# Patient Record
Sex: FEMALE | Race: BLACK OR AFRICAN AMERICAN | Employment: FULL TIME | URBAN - METROPOLITAN AREA
[De-identification: names, ages, dates, MRNs, and addresses within clinical notes are randomized per-mention and may not be internally consistent; named-entity substitution may affect disease eponyms.]

---

## 2017-02-06 ENCOUNTER — APPOINTMENT (OUTPATIENT)
Dept: CT IMAGING | Facility: HOSPITAL | Age: 57
DRG: 440 | End: 2017-02-06
Attending: EMERGENCY MEDICINE
Payer: COMMERCIAL

## 2017-02-06 ENCOUNTER — HOSPITAL ENCOUNTER (INPATIENT)
Facility: HOSPITAL | Age: 57
LOS: 1 days | Discharge: HOME | DRG: 440 | End: 2017-02-07
Attending: EMERGENCY MEDICINE | Admitting: INTERNAL MEDICINE
Payer: COMMERCIAL

## 2017-02-06 DIAGNOSIS — K85.30 DRUG-INDUCED ACUTE PANCREATITIS, UNSPECIFIED COMPLICATION STATUS: Primary | ICD-10-CM

## 2017-02-06 PROBLEM — R73.9 HYPERGLYCEMIA: Status: ACTIVE | Noted: 2017-02-06

## 2017-02-06 PROBLEM — R79.89 AZOTEMIA: Status: ACTIVE | Noted: 2017-02-06

## 2017-02-06 LAB
ALBUMIN SERPL-MCNC: 3.7 G/DL (ref 3.5–4.8)
ALP LIVER SERPL-CCNC: 77 U/L (ref 46–118)
ALT SERPL-CCNC: 17 U/L (ref 14–54)
AST SERPL-CCNC: 12 U/L (ref 15–41)
ATRIAL RATE: 87 BPM
BASOPHILS # BLD AUTO: 0.03 X10(3) UL (ref 0–0.1)
BASOPHILS NFR BLD AUTO: 0.2 %
BILIRUB SERPL-MCNC: 0.3 MG/DL (ref 0.1–2)
BUN BLD-MCNC: 22 MG/DL (ref 8–20)
CALCIUM BLD-MCNC: 8.5 MG/DL (ref 8.3–10.3)
CHLORIDE: 103 MMOL/L (ref 101–111)
CHOLEST SMN-MCNC: 184 MG/DL (ref ?–200)
CO2: 26 MMOL/L (ref 22–32)
CREAT BLD-MCNC: 0.87 MG/DL (ref 0.55–1.02)
EOSINOPHIL # BLD AUTO: 0.1 X10(3) UL (ref 0–0.3)
EOSINOPHIL NFR BLD AUTO: 0.7 %
ERYTHROCYTE [DISTWIDTH] IN BLOOD BY AUTOMATED COUNT: 13.6 % (ref 11.5–16)
EST. AVERAGE GLUCOSE BLD GHB EST-MCNC: 286 MG/DL (ref 68–126)
GLUCOSE BLD-MCNC: 158 MG/DL (ref 65–99)
GLUCOSE BLD-MCNC: 186 MG/DL (ref 65–99)
GLUCOSE BLD-MCNC: 252 MG/DL (ref 65–99)
GLUCOSE BLD-MCNC: 362 MG/DL (ref 70–99)
GLUCOSE BLD-MCNC: 87 MG/DL (ref 65–99)
HBA1C MFR BLD HPLC: 11.6 % (ref ?–5.7)
HCT VFR BLD AUTO: 41.9 % (ref 34–50)
HDLC SERPL-MCNC: 48 MG/DL (ref 45–?)
HDLC SERPL: 3.83 {RATIO} (ref ?–4.44)
HGB BLD-MCNC: 14.4 G/DL (ref 12–16)
IMMATURE GRANULOCYTE COUNT: 0.02 X10(3) UL (ref 0–1)
IMMATURE GRANULOCYTE RATIO %: 0.1 %
LDLC SERPL CALC-MCNC: 93 MG/DL (ref ?–130)
LIPASE: >7500 U/L (ref 73–393)
LYMPHOCYTES # BLD AUTO: 4.3 X10(3) UL (ref 0.9–4)
LYMPHOCYTES NFR BLD AUTO: 32.2 %
M PROTEIN MFR SERPL ELPH: 7.4 G/DL (ref 6.1–8.3)
MCH RBC QN AUTO: 29 PG (ref 27–33.2)
MCHC RBC AUTO-ENTMCNC: 34.4 G/DL (ref 31–37)
MCV RBC AUTO: 84.5 FL (ref 81–100)
MONOCYTES # BLD AUTO: 0.98 X10(3) UL (ref 0.1–0.6)
MONOCYTES NFR BLD AUTO: 7.3 %
NEUTROPHIL ABS PRELIM: 7.94 X10 (3) UL (ref 1.3–6.7)
NEUTROPHILS # BLD AUTO: 7.94 X10(3) UL (ref 1.3–6.7)
NEUTROPHILS NFR BLD AUTO: 59.5 %
NONHDLC SERPL-MCNC: 136 MG/DL (ref ?–130)
P AXIS: 74 DEGREES
P-R INTERVAL: 164 MS
PLATELET # BLD AUTO: 204 10(3)UL (ref 150–450)
POTASSIUM SERPL-SCNC: 3.8 MMOL/L (ref 3.6–5.1)
Q-T INTERVAL: 392 MS
QRS DURATION: 82 MS
QTC CALCULATION (BEZET): 471 MS
R AXIS: 32 DEGREES
RBC # BLD AUTO: 4.96 X10(6)UL (ref 3.8–5.1)
RED CELL DISTRIBUTION WIDTH-SD: 42.1 FL (ref 35.1–46.3)
SODIUM SERPL-SCNC: 139 MMOL/L (ref 136–144)
T AXIS: 83 DEGREES
TRIGLYCERIDES: 213 MG/DL (ref ?–150)
TROPONIN: <0.046 NG/ML (ref ?–0.05)
VENTRICULAR RATE: 87 BPM
VLDL: 43 MG/DL (ref 5–40)
WBC # BLD AUTO: 13.4 X10(3) UL (ref 4–13)

## 2017-02-06 PROCEDURE — 99223 1ST HOSP IP/OBS HIGH 75: CPT | Performed by: INTERNAL MEDICINE

## 2017-02-06 PROCEDURE — 74177 CT ABD & PELVIS W/CONTRAST: CPT

## 2017-02-06 RX ORDER — HYDROMORPHONE HYDROCHLORIDE 1 MG/ML
0.5 INJECTION, SOLUTION INTRAMUSCULAR; INTRAVENOUS; SUBCUTANEOUS EVERY 30 MIN PRN
Status: ACTIVE | OUTPATIENT
Start: 2017-02-06 | End: 2017-02-06

## 2017-02-06 RX ORDER — ONDANSETRON 2 MG/ML
4 INJECTION INTRAMUSCULAR; INTRAVENOUS ONCE
Status: COMPLETED | OUTPATIENT
Start: 2017-02-06 | End: 2017-02-06

## 2017-02-06 RX ORDER — PRAVASTATIN SODIUM 20 MG
20 TABLET ORAL NIGHTLY
Status: DISCONTINUED | OUTPATIENT
Start: 2017-02-06 | End: 2017-02-07

## 2017-02-06 RX ORDER — SODIUM CHLORIDE 9 MG/ML
INJECTION, SOLUTION INTRAVENOUS CONTINUOUS
Status: ACTIVE | OUTPATIENT
Start: 2017-02-06 | End: 2017-02-06

## 2017-02-06 RX ORDER — ONDANSETRON 2 MG/ML
4 INJECTION INTRAMUSCULAR; INTRAVENOUS EVERY 6 HOURS PRN
Status: DISCONTINUED | OUTPATIENT
Start: 2017-02-06 | End: 2017-02-07

## 2017-02-06 RX ORDER — HYDROMORPHONE HYDROCHLORIDE 1 MG/ML
0.8 INJECTION, SOLUTION INTRAMUSCULAR; INTRAVENOUS; SUBCUTANEOUS EVERY 2 HOUR PRN
Status: DISCONTINUED | OUTPATIENT
Start: 2017-02-06 | End: 2017-02-07

## 2017-02-06 RX ORDER — HYDROCODONE BITARTRATE AND ACETAMINOPHEN 5; 325 MG/1; MG/1
1 TABLET ORAL EVERY 4 HOURS PRN
Status: DISCONTINUED | OUTPATIENT
Start: 2017-02-06 | End: 2017-02-07

## 2017-02-06 RX ORDER — SODIUM CHLORIDE 9 MG/ML
INJECTION, SOLUTION INTRAVENOUS CONTINUOUS
Status: DISCONTINUED | OUTPATIENT
Start: 2017-02-06 | End: 2017-02-06

## 2017-02-06 RX ORDER — HYDROMORPHONE HYDROCHLORIDE 1 MG/ML
0.2 INJECTION, SOLUTION INTRAMUSCULAR; INTRAVENOUS; SUBCUTANEOUS EVERY 2 HOUR PRN
Status: DISCONTINUED | OUTPATIENT
Start: 2017-02-06 | End: 2017-02-07

## 2017-02-06 RX ORDER — HYDROMORPHONE HYDROCHLORIDE 1 MG/ML
0.4 INJECTION, SOLUTION INTRAMUSCULAR; INTRAVENOUS; SUBCUTANEOUS EVERY 2 HOUR PRN
Status: DISCONTINUED | OUTPATIENT
Start: 2017-02-06 | End: 2017-02-07

## 2017-02-06 RX ORDER — DEXTROSE MONOHYDRATE 25 G/50ML
50 INJECTION, SOLUTION INTRAVENOUS
Status: DISCONTINUED | OUTPATIENT
Start: 2017-02-06 | End: 2017-02-07

## 2017-02-06 RX ORDER — HYDROCODONE BITARTRATE AND ACETAMINOPHEN 5; 325 MG/1; MG/1
2 TABLET ORAL EVERY 4 HOURS PRN
Status: DISCONTINUED | OUTPATIENT
Start: 2017-02-06 | End: 2017-02-07

## 2017-02-06 RX ORDER — ONDANSETRON 2 MG/ML
4 INJECTION INTRAMUSCULAR; INTRAVENOUS EVERY 4 HOURS PRN
Status: DISCONTINUED | OUTPATIENT
Start: 2017-02-06 | End: 2017-02-07

## 2017-02-06 RX ORDER — ENOXAPARIN SODIUM 100 MG/ML
40 INJECTION SUBCUTANEOUS DAILY
Status: DISCONTINUED | OUTPATIENT
Start: 2017-02-06 | End: 2017-02-07

## 2017-02-06 RX ORDER — POTASSIUM CHLORIDE 20 MEQ/1
40 TABLET, EXTENDED RELEASE ORAL ONCE
Status: COMPLETED | OUTPATIENT
Start: 2017-02-06 | End: 2017-02-06

## 2017-02-06 RX ORDER — KETOROLAC TROMETHAMINE 30 MG/ML
30 INJECTION, SOLUTION INTRAMUSCULAR; INTRAVENOUS ONCE
Status: COMPLETED | OUTPATIENT
Start: 2017-02-06 | End: 2017-02-06

## 2017-02-06 RX ORDER — HYDROMORPHONE HYDROCHLORIDE 1 MG/ML
1 INJECTION, SOLUTION INTRAMUSCULAR; INTRAVENOUS; SUBCUTANEOUS EVERY 30 MIN PRN
Status: DISCONTINUED | OUTPATIENT
Start: 2017-02-06 | End: 2017-02-07

## 2017-02-06 RX ORDER — ACETAMINOPHEN 325 MG/1
650 TABLET ORAL EVERY 4 HOURS PRN
Status: DISCONTINUED | OUTPATIENT
Start: 2017-02-06 | End: 2017-02-07

## 2017-02-06 RX ORDER — SODIUM CHLORIDE, SODIUM LACTATE, POTASSIUM CHLORIDE, CALCIUM CHLORIDE 600; 310; 30; 20 MG/100ML; MG/100ML; MG/100ML; MG/100ML
INJECTION, SOLUTION INTRAVENOUS CONTINUOUS
Status: DISCONTINUED | OUTPATIENT
Start: 2017-02-06 | End: 2017-02-07

## 2017-02-06 NOTE — H&P
[facility]                                                               History & Physical         Greg Villatoro Patient Status:  Emergency    1960 MRN [id number]   Location [facility] Attending Juanjose Cordon, 175 lb (79.379 kg), SpO2 97 %, not currently breastfeeding. General: No acute distress. Alert and oriented x 3. HEENT: Moist mucous membranes. EOM-I. PERRL  Neck: No lymphadenopathy. No JVD. No carotid bruits.   Respiratory: Clear to auscultation bilater no    Plan of care discussed with patient and patient's  at bedside      Discussed with ER Physician. Discussed in detail with patient and family.   Dr. Makeda Chicas will follow from morning today          Sandy Clements MD  2/6/2017  4:49 AM

## 2017-02-06 NOTE — CONSULTS
[facility]      Endocrinology Consultation    Fermín Sandoval Patient Status:  Inpatient    1960 MRN [id number]   [facility] 3NW-A Attending Jacque James MD   Hosp Day # 0 PCP Ramy De León MD     Reason for Consultation:  Luis A Haines Rfl:  Taking   Pravastatin Sodium 20 MG Oral Tab Take 20 mg by mouth nightly. Disp:  Rfl:  Taking   glimepiride 4 MG Oral Tab Take 1 tablet (4 mg total) by mouth 2 (two) times daily.  Disp: 180 tablet Rfl: 3 Taking       History:  Past Medical History   Debo PRN **OR** HYDROcodone-acetaminophen (NORCO) 5-325 MG per tab 2 tablet, 2 tablet, Oral, Q4H PRN  •  HYDROmorphone HCl PF (DILAUDID) 1 MG/ML injection 0.2 mg, 0.2 mg, Intravenous, Q2H PRN **OR** HYDROmorphone HCl PF (DILAUDID) 1 MG/ML injection 0.4 mg, 0.4 Ref. Range 2/6/2017 04:04   HGBA1C Latest Ref Range: <5.7 % 11.6 (H)     Impression and Plan:    Uncontrolled type 2 diabetes mellitus with hyperglycemia  Long term insulin use   - A1c 11.6%   - Will need more education, glucose checks throughout the day r

## 2017-02-06 NOTE — CONSULTS
[facility]  Diabetes Clinical Nurse Specialist Consult Note    [facility] Patient Status:  Inpatient    1960 MRN [id number]   [facility] 3NW-A Attending Stephen Pierce MD   Hosp Day # 0 PCP Jassi Allen MD     Reason for  the [employer]" ernst for looking up carbohydrate amounts.      Plan:    · Diabetes videos ordered  · Everyone can carbohydrate count  · Basic Skills for Controlling Diabetes    · Dietitian consult for introduction to carbohydrate counting and me

## 2017-02-06 NOTE — ED PROVIDER NOTES
Patient Seen in: [facility] Emergency Department    History   Patient presents with:  Abdomen/Flank Pain (GI/)    Stated Complaint: ABD. PAIN    HPI    Very pleasant 65-year-old female complaining of having epigastric pain that radiates into her ba 0406 108/60 mmHg   Pulse 02/06/17 0406 87   Resp 02/06/17 0406 16   Temp 02/06/17 0406 97.7 °F (36.5 °C)   Temp src 02/06/17 0406 Temporal   SpO2 02/06/17 0406 97 %   O2 Device 02/06/17 0406 None (Room air)       Current:/60 mmHg  Pulse 87  Temp([facility]) -----------         ------                     CBC W/ DIFFERENTIAL[[id number]]          Abnormal            Final result                 Please view results for these tests on the individual orders.    RAINBOW DRAW BLUE   RAINBOW DRAW GO

## 2017-02-06 NOTE — ED INITIAL ASSESSMENT (HPI)
Pt ambulatory to er with family c.c. Mid abd pain x four days started to upper right and now feels tightness to upper back. No vomiting but nauseated, no diarrhea.

## 2017-02-06 NOTE — CONSULTS
[facility]                       Gastroenterology [address] [facility]    Gloria Monterroso Patient Status:  Inpatient    1960 MRN [id number]   [facility] 3NW-A Attending Michael Mclaughlin MD   [facility] Day # 0 P unremarkable colonoscopy 7 years ago at [facility] and has never completed an EUS or EGD.  Since presenting to the ER she has completed a CT scan of the abdomen and pelvis with IV contrast which suggests fat stranding and a small amount of fluid s tab 1 tablet 1 tablet Oral Q4H PRN   Or      HYDROcodone-acetaminophen (NORCO) 5-325 MG per tab 2 tablet 2 tablet Oral Q4H PRN   HYDROmorphone HCl PF (DILAUDID) 1 MG/ML injection 0.2 mg 0.2 mg Intravenous Q2H PRN   Or      HYDROmorphone HCl PF (DILAUDID) 1 nephrolithiasis           Psychiatric: The patient reports no history of depression, anxiety, suicidal ideation, or homicidal ideation           Oncologic: The patient reports no history of prior solid tumor or hematologic malignancy           ENT: [facility] HGB  14.4   HCT  41.9   MCV  84.5   MCH  29.0   MCHC  34.4   RDW  13.6   NEPRELIM  7.94*   WBC  13.4*   PLT  204.0       Recent Labs   Lab  02/06/17   0404   ALT  17   AST  12*       Imaging:   PROCEDURE:  CT ABDOMEN+PELVIS(CONTRAST ONLY)(CPT=74177)     MD        __________________________________________________________________________    PROCEDURE:  US ABDOMEN COMPLETE (CPT=76700)      COMPARISON:  None.      INDICATIONS:  R10.11 Right upper quadrant pain      TECHNIQUE:  Real time gray-scale ultrasound 150 ml/hr  2. NPO with ice chips  3. IgG 4 levels to blood in lab  4. Encourage activity as tolerated; IS hourly while awake  5. Pain control per PCP recommendations  6. Zofran 4 mg IV q 6 hours as needed for nausea  7.  Repeat CMP, CBC, Mg in AM replacing

## 2017-02-06 NOTE — PROGRESS NOTES
Pt seen and examined. SHe has been admitted this am by Dr Samara Cuellar. Her pain is controlled. WIll follow closely. Endo following for DM 2.     Kiran Jacobo MD  [facility] Hospitalist

## 2017-02-07 ENCOUNTER — APPOINTMENT (OUTPATIENT)
Dept: ULTRASOUND IMAGING | Facility: HOSPITAL | Age: 57
DRG: 440 | End: 2017-02-07
Attending: INTERNAL MEDICINE
Payer: COMMERCIAL

## 2017-02-07 VITALS
WEIGHT: 175 LBS | HEIGHT: 64 IN | TEMPERATURE: 98 F | HEART RATE: 75 BPM | RESPIRATION RATE: 18 BRPM | OXYGEN SATURATION: 100 % | DIASTOLIC BLOOD PRESSURE: 75 MMHG | BODY MASS INDEX: 29.88 KG/M2 | SYSTOLIC BLOOD PRESSURE: 139 MMHG

## 2017-02-07 LAB
ALBUMIN SERPL-MCNC: 2.9 G/DL (ref 3.5–4.8)
ALP LIVER SERPL-CCNC: 60 U/L (ref 46–118)
ALT SERPL-CCNC: 13 U/L (ref 14–54)
AST SERPL-CCNC: 11 U/L (ref 15–41)
BASOPHILS # BLD AUTO: 0.01 X10(3) UL (ref 0–0.1)
BASOPHILS NFR BLD AUTO: 0.1 %
BILIRUB SERPL-MCNC: 0.5 MG/DL (ref 0.1–2)
BUN BLD-MCNC: 15 MG/DL (ref 8–20)
CALCIUM BLD-MCNC: 8.2 MG/DL (ref 8.3–10.3)
CHLORIDE: 110 MMOL/L (ref 101–111)
CO2: 26 MMOL/L (ref 22–32)
CREAT BLD-MCNC: 0.45 MG/DL (ref 0.55–1.02)
EOSINOPHIL # BLD AUTO: 0.08 X10(3) UL (ref 0–0.3)
EOSINOPHIL NFR BLD AUTO: 1 %
ERYTHROCYTE [DISTWIDTH] IN BLOOD BY AUTOMATED COUNT: 13.5 % (ref 11.5–16)
GLUCOSE BLD-MCNC: 90 MG/DL (ref 65–99)
GLUCOSE BLD-MCNC: 95 MG/DL (ref 70–99)
GLUCOSE BLD-MCNC: 98 MG/DL (ref 65–99)
HAV IGM SER QL: 1.9 MG/DL (ref 1.7–3)
HCT VFR BLD AUTO: 36.1 % (ref 34–50)
HGB BLD-MCNC: 11.9 G/DL (ref 12–16)
IMMATURE GRANULOCYTE COUNT: 0.01 X10(3) UL (ref 0–1)
IMMATURE GRANULOCYTE RATIO %: 0.1 %
IMMUNOGLOBULIN G SUBCLASS 1: 598 MG/DL
IMMUNOGLOBULIN G SUBCLASS 2: 255 MG/DL
IMMUNOGLOBULIN G SUBCLASS 3: 32 MG/DL
IMMUNOGLOBULIN G SUBCLASS 4: 61 MG/DL
LIPASE: 1163 U/L (ref 73–393)
LYMPHOCYTES # BLD AUTO: 3.4 X10(3) UL (ref 0.9–4)
LYMPHOCYTES NFR BLD AUTO: 43.1 %
M PROTEIN MFR SERPL ELPH: 6.4 G/DL (ref 6.1–8.3)
MCH RBC QN AUTO: 28.8 PG (ref 27–33.2)
MCHC RBC AUTO-ENTMCNC: 33 G/DL (ref 31–37)
MCV RBC AUTO: 87.4 FL (ref 81–100)
MONOCYTES # BLD AUTO: 0.56 X10(3) UL (ref 0.1–0.6)
MONOCYTES NFR BLD AUTO: 7.1 %
NEUTROPHIL ABS PRELIM: 3.82 X10 (3) UL (ref 1.3–6.7)
NEUTROPHILS # BLD AUTO: 3.82 X10(3) UL (ref 1.3–6.7)
NEUTROPHILS NFR BLD AUTO: 48.6 %
PLATELET # BLD AUTO: 161 10(3)UL (ref 150–450)
POTASSIUM SERPL-SCNC: 3.5 MMOL/L (ref 3.6–5.1)
RBC # BLD AUTO: 4.13 X10(6)UL (ref 3.8–5.1)
RED CELL DISTRIBUTION WIDTH-SD: 43 FL (ref 35.1–46.3)
SODIUM SERPL-SCNC: 144 MMOL/L (ref 136–144)
WBC # BLD AUTO: 7.9 X10(3) UL (ref 4–13)

## 2017-02-07 PROCEDURE — 99239 HOSP IP/OBS DSCHRG MGMT >30: CPT | Performed by: INTERNAL MEDICINE

## 2017-02-07 PROCEDURE — 76700 US EXAM ABDOM COMPLETE: CPT

## 2017-02-07 RX ORDER — VALSARTAN AND HYDROCHLOROTHIAZIDE 160; 12.5 MG/1; MG/1
1 TABLET, FILM COATED ORAL DAILY
Status: DISCONTINUED | OUTPATIENT
Start: 2017-02-07 | End: 2017-02-07 | Stop reason: RX

## 2017-02-07 RX ORDER — POTASSIUM CHLORIDE 20 MEQ/1
40 TABLET, EXTENDED RELEASE ORAL EVERY 4 HOURS
Status: DISCONTINUED | OUTPATIENT
Start: 2017-02-07 | End: 2017-02-07

## 2017-02-07 RX ORDER — HYDROCODONE BITARTRATE AND ACETAMINOPHEN 5; 325 MG/1; MG/1
1 TABLET ORAL EVERY 4 HOURS PRN
Qty: 10 TABLET | Refills: 0 | Status: SHIPPED | OUTPATIENT
Start: 2017-02-07 | End: 2017-03-02

## 2017-02-07 NOTE — PROGRESS NOTES
Written and verbal discharge instructions given to patient and mother, verbalize understanding. IV discontinued in RAC angio-cath tip intact, site free from redness, swelling, or drainage, patient denies pain at site. Dressing applied.   Prescription given

## 2017-02-07 NOTE — PROGRESS NOTES
Gastroenterology Progress Note  S: Had mild bloating after breakfast of clear liquids, now eating soft diet.  No pain, wants to go home, passing flatus  O: /71 mmHg  Pulse 79  Temp(Src) 98.1 °F (36.7 °C) (Oral)  Resp 18  Ht 162.6 cm (5' 4\")  Wt 175 l

## 2017-02-07 NOTE — PAYOR COMM NOTE
Attending Physician: Estelita Vazquez MD    Review Type: ADMISSION   Reviewer: Milan Hammans       Date: February 7, 2017 - 12:17 PM  Payor: Nusrat JEAN PPO.EPO[employer]  Authorization Number: [id number]  Admit date: 2/6/2017  3:58 AM   Admitted from     LUNG BASE:  Scattered atelectasis. LIVER:  Unremarkable. BILIARY:  Unremarkable. SPLEEN:  Unremarkable. PANCREAS:  There is fat stranding and a small amount of fluid surrounding the pancreas suspicious for acute pancreatitis.   ADRENALS:  Unremarkabl Date Action Dose Route User    2/7/2017 0957 Given 40 mg Subcutaneous (Left Lower Abdomen) Lala Medina RN      HYDROmorphone HCl PF (DILAUDID) 1 MG/ML injection 0.4 mg     Date Action Dose Route User    2/6/2017 2011 Given 0.4 mg Intravenous Kremic, Am Date Action Dose Route User    2/6/2017 2206 Given 20 mg Oral Kavitha Browning RN          RESULTS LAST 24HRS:  Labs Reviewed   LIPASE - Abnormal; Notable for the following:     Lipase >7500 (*)     All other components within normal limits   COMP METABOLIC CBC WITH DIFFERENTIAL WITH PLATELET    Narrative: The following orders were created for panel order CBC WITH DIFFERENTIAL WITH PLATELET.   Procedure                               Abnormality         Status                     ---------

## 2017-02-07 NOTE — DISCHARGE SUMMARY
[facility] [place] [facility]OSPITALIST  DISCHARGE SUMMARY     Clarice Mcdonnell Patient Status:  Inpatient    1960 MRN [id number]   [facility] 3NW-A Attending Jesu Almeida MD   Hosp Day # 1 PCP Riki Mitchell MD     Date of Admission: 2017  Date of to tolerate her diet. She will be discharged home and follow up as outpatient. Her glimepride and statin has been discontinued.      Procedures during hospitalization:   • none    Incidental or significant findings and recommendations (brief description 18  BP: (110-119)/(55-99) 116/71 mmHg    Physical Exam:    General: No acute distress. Respiratory: Clear to auscultation bilaterally. No wheezes. No rhonchi. Cardiovascular: S1, S2. Regular rate and rhythm. No murmurs, rubs or gallops.    Abdomen: Soft,

## 2017-02-07 NOTE — PROGRESS NOTES
[facility]  Endocrinology Progress Note    Junstephanie Erp Patient Status:  Inpatient    1960 MRN [id number]   [facility] 3NW-A Attending Roz Turner MD   Hosp Day # 1 PCP Jeremie Dodge MD     Subjective:  Walking around, fe 2/6/2017 04:04   CHOLESTEROL, TOTAL Latest Ref Range: <200 mg/dL 184   Triglycerides Latest Ref Range: <150 mg/dL 213 (H)   HDL Cholesterol Latest Ref Range: >45 mg/dL 48   VLDL Latest Ref Range: 5-40 mg/dL 43 (H)   T. CHOL/HDL RATIO Latest Ref Range: <4.4

## 2017-02-07 NOTE — DIETARY NOTE
Nutrition Short Note    Dietitian consult received for diabetes education. Noted pt hemoglobin A1C 11.6. Reviewed and provided handouts on carbohydrate counting/label reading.  Discussed the role of CHO/protein and fat in BS control, reviewed importance of

## 2017-02-07 NOTE — PROGRESS NOTES
Patient seen and examined this am.     She is overall doing great. She is hungry and wants to try low fat diet. If she tolerates lunch she may be discharged. Med rec completed. Stop STATIN. DC summary to follow.      Daljit Crisostomo MD  [facility]

## 2017-02-07 NOTE — PLAN OF CARE
Problem: PAIN - ADULT  Goal: Verbalizes/displays adequate comfort level or patient’s stated pain goal  INTERVENTIONS:  - Encourage pt to monitor pain and request assistance  - Assess pain using appropriate pain scale  - Administer analgesics based on type interpreters to assist at discharge as needed  - Consider post-discharge preferences of patient/family/discharge partner  - Complete POLST form as appropriate  - Assess patient’s ability to be responsible for managing their own health  - Refer to [facility]

## 2017-04-03 ENCOUNTER — ANESTHESIA EVENT (OUTPATIENT)
Dept: ENDOSCOPY | Facility: HOSPITAL | Age: 57
End: 2017-04-03

## 2017-04-04 ENCOUNTER — HOSPITAL ENCOUNTER (OUTPATIENT)
Facility: HOSPITAL | Age: 57
Setting detail: HOSPITAL OUTPATIENT SURGERY
Discharge: HOME | End: 2017-04-04
Attending: INTERNAL MEDICINE | Admitting: INTERNAL MEDICINE
Payer: COMMERCIAL

## 2017-04-04 ENCOUNTER — ANESTHESIA (OUTPATIENT)
Dept: ENDOSCOPY | Facility: HOSPITAL | Age: 57
End: 2017-04-04

## 2017-04-04 ENCOUNTER — SURGERY (OUTPATIENT)
Age: 57
End: 2017-04-04

## 2017-04-04 VITALS
OXYGEN SATURATION: 99 % | DIASTOLIC BLOOD PRESSURE: 71 MMHG | WEIGHT: 177 LBS | BODY MASS INDEX: 30.22 KG/M2 | HEIGHT: 64 IN | HEART RATE: 74 BPM | TEMPERATURE: 98 F | RESPIRATION RATE: 15 BRPM | SYSTOLIC BLOOD PRESSURE: 153 MMHG

## 2017-04-04 PROCEDURE — 88305 TISSUE EXAM BY PATHOLOGIST: CPT | Performed by: INTERNAL MEDICINE

## 2017-04-04 PROCEDURE — 82962 GLUCOSE BLOOD TEST: CPT

## 2017-04-04 PROCEDURE — 0DB98ZX EXCISION OF DUODENUM, VIA NATURAL OR ARTIFICIAL OPENING ENDOSCOPIC, DIAGNOSTIC: ICD-10-PCS | Performed by: INTERNAL MEDICINE

## 2017-04-04 PROCEDURE — 0DB68ZX EXCISION OF STOMACH, VIA NATURAL OR ARTIFICIAL OPENING ENDOSCOPIC, DIAGNOSTIC: ICD-10-PCS | Performed by: INTERNAL MEDICINE

## 2017-04-04 PROCEDURE — 0DJD8ZZ INSPECTION OF LOWER INTESTINAL TRACT, VIA NATURAL OR ARTIFICIAL OPENING ENDOSCOPIC: ICD-10-PCS | Performed by: INTERNAL MEDICINE

## 2017-04-04 PROCEDURE — 0DJ08ZZ INSPECTION OF UPPER INTESTINAL TRACT, VIA NATURAL OR ARTIFICIAL OPENING ENDOSCOPIC: ICD-10-PCS | Performed by: INTERNAL MEDICINE

## 2017-04-04 RX ORDER — DEXTROSE MONOHYDRATE 25 G/50ML
50 INJECTION, SOLUTION INTRAVENOUS
Status: DISCONTINUED | OUTPATIENT
Start: 2017-04-04 | End: 2017-04-04

## 2017-04-04 RX ORDER — SODIUM CHLORIDE, SODIUM LACTATE, POTASSIUM CHLORIDE, CALCIUM CHLORIDE 600; 310; 30; 20 MG/100ML; MG/100ML; MG/100ML; MG/100ML
INJECTION, SOLUTION INTRAVENOUS CONTINUOUS
Status: DISCONTINUED | OUTPATIENT
Start: 2017-04-04 | End: 2017-04-04

## 2017-04-04 RX ORDER — HYDROMORPHONE HYDROCHLORIDE 1 MG/ML
0.4 INJECTION, SOLUTION INTRAMUSCULAR; INTRAVENOUS; SUBCUTANEOUS EVERY 5 MIN PRN
Status: DISCONTINUED | OUTPATIENT
Start: 2017-04-04 | End: 2017-04-04

## 2017-04-04 RX ORDER — NALOXONE HYDROCHLORIDE 0.4 MG/ML
80 INJECTION, SOLUTION INTRAMUSCULAR; INTRAVENOUS; SUBCUTANEOUS AS NEEDED
Status: DISCONTINUED | OUTPATIENT
Start: 2017-04-04 | End: 2017-04-04

## 2017-04-04 NOTE — OPERATIVE REPORT
Joanjelena Tejas Patient Status:  Hospital Outpatient Surgery    1960 MRN [id number]   [facility] ENDOSCOPY Attending Fernando Dominguez MD   Hosp Day # 0 PCP Asya Colindres MD     PREOPERATIVE DIAGNOSIS/INDICATION: H/o pancreatitis the GB appear wnl, the biliary tree appear wnl, the confluence of the portal vein, superior mesenteric vein and splenic vein appear wnl.   THERAPEUTICS: Random biopsies were performed from the duodenum 2nd portion, antrum and gastric nodularity  RECOMMENDAT

## 2017-04-04 NOTE — ANESTHESIA PREPROCEDURE EVALUATION
PRE-OP EVALUATION    Patient Name: Brandie Lozano    Pre-op Diagnosis: PANCREATITIS     Procedure(s):  ESOPHAGOGASTRODUODENOSCOPY, COLONOSCOPY, AND ENDOSCOPIC ULTRASOUND        Surgeon(s) and Role:     Gia Samayoa MD - Primary    Pre-op vitals re Smoking status: Heavy Tobacco Smoker  0.50 Packs/Day  For 37.00 Years     Smokeless tobacco: Not on file    Alcohol Use: No    Comment: social, wine only       Drug Use: No     Available pre-op labs reviewed.     Lab Results  Component Value Date   WBC 7.9

## 2017-04-04 NOTE — ANESTHESIA POSTPROCEDURE EVALUATION
[address] [facility] Emergency Department Patient Status:  Hospital Outpatient Surgery   Age/Gender 61year old female MRN [id number]   Location [address] [place] [address]. Attending Adrian Ibarra MD   Hosp Day # 0 PCP Riki Mitchell MD       Anesthesia Post-o

## 2017-04-04 NOTE — OPERATIVE REPORT
Angela Murphy Patient Status:  Hospital Outpatient Surgery    1960 MRN [id number]   [facility] ENDOSCOPY Attending Lisa Stockton MD   Hosp Day # 0 PCP Kunal Shine MD     PREOPERATIVE DIAGNOSIS/INDICATION: H/o colon polyps

## 2017-04-04 NOTE — H&P
[address] [facility] Patient Status:  Hospital Outpatient Surgery    1960 MRN [id number]   [facility] ENDOSCOPY Attending Jyoti Shin MD   Hosp Day # 0 PCP Cory Arambula MD     CC: H/o pancreatit HYDROmorphone HCl PF (DILAUDID) 1 MG/ML injection 0.4 mg, 0.4 mg, Intravenous, Q5 Min PRN  •  Atropine Sulfate 0.1 MG/ML injection 0.5 mg, 0.5 mg, Intravenous, PRN  •  Naloxone HCl (NARCAN) 0.4 MG/ML injection 80 mcg, 80 mcg, Intravenous, PRN    Physical E

## 2017-04-06 PROBLEM — E11.65 TYPE 2 DIABETES MELLITUS WITH HYPERGLYCEMIA, WITH LONG-TERM CURRENT USE OF INSULIN (HCC): Status: ACTIVE | Noted: 2017-04-06

## 2017-04-06 PROBLEM — Z79.4 TYPE 2 DIABETES MELLITUS WITH HYPERGLYCEMIA, WITH LONG-TERM CURRENT USE OF INSULIN (HCC): Status: ACTIVE | Noted: 2017-04-06

## 2017-04-06 PROBLEM — R73.9 HYPERGLYCEMIA: Status: RESOLVED | Noted: 2017-02-06 | Resolved: 2017-04-06

## 2017-06-06 PROBLEM — Z87.19 HISTORY OF PANCREATITIS: Status: ACTIVE | Noted: 2017-06-06

## 2017-09-27 ENCOUNTER — LAB ENCOUNTER (OUTPATIENT)
Dept: LAB | Facility: HOSPITAL | Age: 57
End: 2017-09-27
Attending: INTERNAL MEDICINE
Payer: COMMERCIAL

## 2017-09-27 DIAGNOSIS — I10 ESSENTIAL HYPERTENSION: ICD-10-CM

## 2017-09-27 DIAGNOSIS — K85.30 DRUG-INDUCED ACUTE PANCREATITIS, UNSPECIFIED COMPLICATION STATUS: ICD-10-CM

## 2017-09-27 DIAGNOSIS — Z87.19 HISTORY OF PANCREATITIS: ICD-10-CM

## 2017-09-27 DIAGNOSIS — Z79.4 TYPE 2 DIABETES MELLITUS WITH HYPERGLYCEMIA, WITH LONG-TERM CURRENT USE OF INSULIN (HCC): ICD-10-CM

## 2017-09-27 DIAGNOSIS — E78.2 MIXED HYPERLIPIDEMIA: ICD-10-CM

## 2017-09-27 DIAGNOSIS — E11.65 TYPE 2 DIABETES MELLITUS WITH HYPERGLYCEMIA, WITH LONG-TERM CURRENT USE OF INSULIN (HCC): ICD-10-CM

## 2017-09-27 LAB
ALBUMIN SERPL-MCNC: 3.1 G/DL (ref 3.5–4.8)
ALP LIVER SERPL-CCNC: 65 U/L (ref 46–118)
ALT SERPL-CCNC: 18 U/L (ref 14–54)
AST SERPL-CCNC: 8 U/L (ref 15–41)
BILIRUB SERPL-MCNC: 0.3 MG/DL (ref 0.1–2)
BUN BLD-MCNC: 17 MG/DL (ref 8–20)
CALCIUM BLD-MCNC: 8.8 MG/DL (ref 8.3–10.3)
CHLORIDE: 107 MMOL/L (ref 101–111)
CO2: 24 MMOL/L (ref 22–32)
CREAT BLD-MCNC: 0.59 MG/DL (ref 0.55–1.02)
CREAT UR-SCNC: 142 MG/DL
FREE T4: 1.1 NG/DL (ref 0.9–1.8)
GLUCOSE BLD-MCNC: 212 MG/DL (ref 70–99)
M PROTEIN MFR SERPL ELPH: 7.3 G/DL (ref 6.1–8.3)
MICROALBUMIN UR-MCNC: 16 MG/DL
MICROALBUMIN/CREAT 24H UR-RTO: 112.7 UG/MG (ref ?–30)
POTASSIUM SERPL-SCNC: 3.7 MMOL/L (ref 3.6–5.1)
SODIUM SERPL-SCNC: 139 MMOL/L (ref 136–144)
TSI SER-ACNC: 1.56 MIU/ML (ref 0.35–5.5)

## 2017-09-27 PROCEDURE — 82570 ASSAY OF URINE CREATININE: CPT

## 2017-09-27 PROCEDURE — 84443 ASSAY THYROID STIM HORMONE: CPT

## 2017-09-27 PROCEDURE — 82043 UR ALBUMIN QUANTITATIVE: CPT

## 2017-09-27 PROCEDURE — 80053 COMPREHEN METABOLIC PANEL: CPT

## 2017-09-27 PROCEDURE — 36415 COLL VENOUS BLD VENIPUNCTURE: CPT

## 2017-09-27 PROCEDURE — 84439 ASSAY OF FREE THYROXINE: CPT

## 2017-09-28 NOTE — PROGRESS NOTES
[phone] (home) [phone] (work)  Telephone Information:  Mobile          [phone] regarding Dr. Marks Given result note. Hours and number given. No identifier on vm.

## 2017-09-28 NOTE — PROGRESS NOTES
Patient informed of Dr. Chaz King result note. Patient verbalized understanding and agrees with plan.      11/15/2017 8:00 AM    MD JORDAN MercadoG JORDAN MED

## 2017-12-05 ENCOUNTER — HOSPITAL ENCOUNTER (OUTPATIENT)
Dept: MAMMOGRAPHY | Age: 57
Discharge: HOME | End: 2017-12-05
Attending: FAMILY MEDICINE
Payer: COMMERCIAL

## 2017-12-05 DIAGNOSIS — Z12.31 ENCOUNTER FOR SCREENING MAMMOGRAM FOR MALIGNANT NEOPLASM OF BREAST: ICD-10-CM

## 2017-12-05 PROCEDURE — 77067 SCR MAMMO BI INCL CAD: CPT | Performed by: FAMILY MEDICINE

## 2017-12-12 ENCOUNTER — HOSPITAL ENCOUNTER (OUTPATIENT)
Dept: MAMMOGRAPHY | Age: 57
Discharge: HOME | End: 2017-12-12
Attending: FAMILY MEDICINE
Payer: COMMERCIAL

## 2017-12-12 ENCOUNTER — OFFICE VISIT (OUTPATIENT)
Dept: SURGERY | Facility: CLINIC | Age: 57
End: 2017-12-12

## 2017-12-12 VITALS — DIASTOLIC BLOOD PRESSURE: 60 MMHG | SYSTOLIC BLOOD PRESSURE: 120 MMHG | HEART RATE: 88 BPM

## 2017-12-12 DIAGNOSIS — R92.2 INCONCLUSIVE MAMMOGRAM: ICD-10-CM

## 2017-12-12 DIAGNOSIS — R20.0 NUMBNESS OF LEFT HAND: ICD-10-CM

## 2017-12-12 DIAGNOSIS — R29.898 NECK TIGHTNESS: Primary | ICD-10-CM

## 2017-12-12 PROCEDURE — 99242 OFF/OP CONSLTJ NEW/EST SF 20: CPT | Performed by: PHYSICIAN ASSISTANT

## 2017-12-12 PROCEDURE — 77061 BREAST TOMOSYNTHESIS UNI: CPT | Performed by: FAMILY MEDICINE

## 2017-12-12 PROCEDURE — 77065 DX MAMMO INCL CAD UNI: CPT | Performed by: FAMILY MEDICINE

## 2017-12-12 RX ORDER — PRAVASTATIN SODIUM 20 MG
20 TABLET ORAL NIGHTLY
COMMUNITY
End: 2017-12-13

## 2017-12-12 RX ORDER — ORPHENADRINE CITRATE 100 MG/1
100 TABLET, EXTENDED RELEASE ORAL 2 TIMES DAILY PRN
Qty: 60 TABLET | Refills: 0 | Status: ON HOLD | OUTPATIENT
Start: 2017-12-12 | End: 2021-12-21

## 2017-12-12 NOTE — H&P
Neurosurgery Clinic Visit  2017    [facility] PCP:  Jassi Allen MD    1960 MRN [id number]       CHIEF COMPLAINT:  Patient presents with:  Neuropathy: NP referred for numbness in left thumb/carpal tunnel       HISTORY OF PRESENT Erven Meigs   2017: COLONOSCOPY N/A      Comment: Procedure: COLONOSCOPY;  Surgeon: Rebecca Mast MD;  Location: [address] [place] [address] ENDOSCOPY  Family History   Problem Relation Age of Onset   • Hypertension Father    • Hypertension Mother    • Diabete numbness. 2.  Neck tightness/stiffness. She describes signs/symptoms of carpal tunnel syndrome in her left hand. She has not tried any conservative treatments, so will start with wrist splints.   We discussed other treatment options, including injectio

## 2017-12-12 NOTE — PROGRESS NOTES
Location of Pain: burning sensation in palm of left hand, numbness and tingling in thumb, and fingers of left hand with exception of pinky    Date Pain Began: 1 yr          Work Related:   No        Receiving Work Comp/Disability:   No    Numeric Rating Sc

## 2017-12-12 NOTE — PATIENT INSTRUCTIONS
Refill policies:    • Allow 2-3 business days for refills; controlled substances may take longer.   • Contact your pharmacy at least 5 days prior to running out of medication and have them send an electronic request or submit request through the [facility] have a procedure or additional testing performed. [facility]) will contact your insurance carrier to obtain pre-certification or prior authorization.     Unfortunately, [employer] has seen an increase in denial of payment even though the p

## 2018-01-08 ENCOUNTER — HOSPITAL ENCOUNTER (OUTPATIENT)
Dept: GENERAL RADIOLOGY | Facility: HOSPITAL | Age: 58
Discharge: HOME | End: 2018-01-08
Attending: PHYSICIAN ASSISTANT
Payer: COMMERCIAL

## 2018-01-08 DIAGNOSIS — R29.898 NECK TIGHTNESS: ICD-10-CM

## 2018-01-08 DIAGNOSIS — R20.0 NUMBNESS OF LEFT HAND: ICD-10-CM

## 2018-01-08 PROCEDURE — 72052 X-RAY EXAM NECK SPINE 6/>VWS: CPT | Performed by: PHYSICIAN ASSISTANT

## 2018-01-09 ENCOUNTER — OFFICE VISIT (OUTPATIENT)
Dept: SURGERY | Facility: CLINIC | Age: 58
End: 2018-01-09

## 2018-01-09 ENCOUNTER — TELEPHONE (OUTPATIENT)
Dept: SURGERY | Facility: CLINIC | Age: 58
End: 2018-01-09

## 2018-01-09 VITALS — DIASTOLIC BLOOD PRESSURE: 70 MMHG | HEART RATE: 80 BPM | SYSTOLIC BLOOD PRESSURE: 118 MMHG

## 2018-01-09 DIAGNOSIS — G56.02 LEFT CARPAL TUNNEL SYNDROME: Primary | ICD-10-CM

## 2018-01-09 PROCEDURE — 99213 OFFICE O/P EST LOW 20 MIN: CPT | Performed by: PHYSICIAN ASSISTANT

## 2018-01-09 NOTE — PATIENT INSTRUCTIONS
Refill policies:    • Allow 2-3 business days for refills; controlled substances may take longer.   • Contact your pharmacy at least 5 days prior to running out of medication and have them send an electronic request or submit request through the [facility] have a procedure or additional testing performed. [facility]) will contact your insurance carrier to obtain pre-certification or prior authorization.     Unfortunately, [employer] has seen an increase in denial of payment even though the p hospital  · You are scheduled for a post operative appointment 10-14 days later for stitches to be removed.    · Post operative you should not lift anything heavier than 5-10 pounds, you should avoid pressure to the palm/wrist.

## 2018-01-09 NOTE — H&P
Neurosurgery Clinic Visit  2018    [facility]reiaantonio [facility] PCP:  Tan Keith MD    1960 MRN [id number]     HISTORY OF PRESENT ILLNESS:  Donna Wilson is a(n) 61year old female who is here for follow-up of left hand numbness.   She states since h left carpal tunnel release. Dr. Destiny Ruth evaluated the patient and is in agreement with the plan. Time spent on counseling/coordination of care:  15 Minutes    Total time spent with patient:  15 Minutes      Sadie Wagner M.S., GORAN Soares

## 2018-01-09 NOTE — TELEPHONE ENCOUNTER
You are scheduled for Left Carpal Tunnel Release on 2/26/2018  with Dr. Bucky Mueller instructions discussed with patient and surgical packet provided:     · You will need preoperative labs, the orders have been placed.   · No blood thinning medicatio

## 2018-01-09 NOTE — PROGRESS NOTES
Neurosurgery Clinic Visit  2018    Clarice Mcdonnell PCP:  Riki Mitchell MD    1960 MRN [id number]     HISTORY OF PRESENT ILLNESS:  Clarice Mcdonnell is a(n) 61year old female who is here for follow-up of left hand numbness.   She states since h left carpal tunnel release. Dr. Essie Jackson evaluated the patient and is in agreement with the plan. Time spent on counseling/coordination of care:  15 Minutes    Total time spent with patient:  15 Minutes      Sadie Wills M.S., GORAN [place] Rom

## 2018-01-23 NOTE — TELEPHONE ENCOUNTER
The [facility] and spoke with verónica no authorization or predetermination needed. Call reference # [id number].  Time on call 37:31

## 2018-02-02 NOTE — TELEPHONE ENCOUNTER
Attempted to reach PCP office to see if patient had an appointment scheduled. Office was currently closed. Will need to check back at a later time.

## 2018-02-06 NOTE — TELEPHONE ENCOUNTER
Pt states she had her pre-op exam this morning. PCP not in EPIC, will have to call and request clearance.   Also wants to let Dr. Martita Yepez and Melva Briones know that her pain is getting worse and she can not hold anything in her hand, would like sooner surgery

## 2018-02-07 NOTE — TELEPHONE ENCOUNTER
PCP clearance still pending, per PCP office notes will not be completed any earlier than 2/7/2018. Will check with office on 2/9/2018 to see if clearance is completed.

## 2018-02-09 ENCOUNTER — TELEPHONE (OUTPATIENT)
Dept: SURGERY | Facility: CLINIC | Age: 58
End: 2018-02-09

## 2018-02-12 NOTE — TELEPHONE ENCOUNTER
Spoke with patient who is going to look through her schedule and call back if either 2/19 or 2/21 would work for her.

## 2018-02-12 NOTE — TELEPHONE ENCOUNTER
pt called back re: moving sx date to earlier date. She said she would love to come in earlier but it depends on the [facility] paperwk getting done prior. Please call her back so she can discuss the dates available.

## 2018-02-12 NOTE — TELEPHONE ENCOUNTER
LMTCB. Need to find out if patient's job is \"hands on\" or if should would be able to void using her left hand at work. FMLA paperwork was received today.  Can change surgery date and have form completed tomorrow when /ABDIFATAH Lester are in

## 2018-02-22 NOTE — TELEPHONE ENCOUNTER
Pt calling to confirm nothing else is needed for sx. Informed pt nothing else needed. Nothing further.

## 2018-02-25 ENCOUNTER — ANESTHESIA EVENT (OUTPATIENT)
Dept: SURGERY | Facility: HOSPITAL | Age: 58
End: 2018-02-25

## 2018-02-26 ENCOUNTER — HOSPITAL ENCOUNTER (OUTPATIENT)
Facility: HOSPITAL | Age: 58
Setting detail: HOSPITAL OUTPATIENT SURGERY
Discharge: HOME | End: 2018-02-26
Attending: NEUROLOGICAL SURGERY | Admitting: NEUROLOGICAL SURGERY
Payer: COMMERCIAL

## 2018-02-26 ENCOUNTER — ANESTHESIA (OUTPATIENT)
Dept: SURGERY | Facility: HOSPITAL | Age: 58
End: 2018-02-26

## 2018-02-26 ENCOUNTER — SURGERY (OUTPATIENT)
Age: 58
End: 2018-02-26

## 2018-02-26 VITALS
SYSTOLIC BLOOD PRESSURE: 133 MMHG | BODY MASS INDEX: 31.45 KG/M2 | HEIGHT: 64 IN | RESPIRATION RATE: 16 BRPM | TEMPERATURE: 98 F | DIASTOLIC BLOOD PRESSURE: 61 MMHG | HEART RATE: 73 BPM | WEIGHT: 184.25 LBS | OXYGEN SATURATION: 99 %

## 2018-02-26 DIAGNOSIS — G56.02 LEFT CARPAL TUNNEL SYNDROME: Primary | ICD-10-CM

## 2018-02-26 LAB
GLUCOSE BLD-MCNC: 130 MG/DL (ref 65–99)
GLUCOSE BLD-MCNC: 90 MG/DL (ref 65–99)

## 2018-02-26 PROCEDURE — 82962 GLUCOSE BLOOD TEST: CPT

## 2018-02-26 PROCEDURE — 01N50ZZ RELEASE MEDIAN NERVE, OPEN APPROACH: ICD-10-PCS | Performed by: NEUROLOGICAL SURGERY

## 2018-02-26 RX ORDER — LABETALOL HYDROCHLORIDE 5 MG/ML
5 INJECTION, SOLUTION INTRAVENOUS EVERY 5 MIN PRN
Status: DISCONTINUED | OUTPATIENT
Start: 2018-02-26 | End: 2018-02-26

## 2018-02-26 RX ORDER — DEXTROSE MONOHYDRATE 25 G/50ML
50 INJECTION, SOLUTION INTRAVENOUS
Status: DISCONTINUED | OUTPATIENT
Start: 2018-02-26 | End: 2018-02-26 | Stop reason: HOSPADM

## 2018-02-26 RX ORDER — NALOXONE HYDROCHLORIDE 0.4 MG/ML
80 INJECTION, SOLUTION INTRAMUSCULAR; INTRAVENOUS; SUBCUTANEOUS AS NEEDED
Status: DISCONTINUED | OUTPATIENT
Start: 2018-02-26 | End: 2018-02-26

## 2018-02-26 RX ORDER — SODIUM CHLORIDE, SODIUM LACTATE, POTASSIUM CHLORIDE, CALCIUM CHLORIDE 600; 310; 30; 20 MG/100ML; MG/100ML; MG/100ML; MG/100ML
INJECTION, SOLUTION INTRAVENOUS CONTINUOUS
Status: DISCONTINUED | OUTPATIENT
Start: 2018-02-26 | End: 2018-02-26

## 2018-02-26 RX ORDER — HYDROCODONE BITARTRATE AND ACETAMINOPHEN 5; 325 MG/1; MG/1
1 TABLET ORAL AS NEEDED
Status: DISCONTINUED | OUTPATIENT
Start: 2018-02-26 | End: 2018-02-26

## 2018-02-26 RX ORDER — MEPERIDINE HYDROCHLORIDE 25 MG/ML
12.5 INJECTION INTRAMUSCULAR; INTRAVENOUS; SUBCUTANEOUS AS NEEDED
Status: DISCONTINUED | OUTPATIENT
Start: 2018-02-26 | End: 2018-02-26

## 2018-02-26 RX ORDER — HYDROCODONE BITARTRATE AND ACETAMINOPHEN 5; 325 MG/1; MG/1
1 TABLET ORAL EVERY 4 HOURS PRN
Qty: 20 TABLET | Refills: 0 | Status: SHIPPED | OUTPATIENT
Start: 2018-02-26 | End: 2018-04-10

## 2018-02-26 RX ORDER — DIPHENHYDRAMINE HYDROCHLORIDE 50 MG/ML
12.5 INJECTION INTRAMUSCULAR; INTRAVENOUS AS NEEDED
Status: DISCONTINUED | OUTPATIENT
Start: 2018-02-26 | End: 2018-02-26

## 2018-02-26 RX ORDER — BUPIVACAINE HYDROCHLORIDE 2.5 MG/ML
INJECTION, SOLUTION EPIDURAL; INFILTRATION; INTRACAUDAL AS NEEDED
Status: DISCONTINUED | OUTPATIENT
Start: 2018-02-26 | End: 2018-02-26 | Stop reason: HOSPADM

## 2018-02-26 RX ORDER — ONDANSETRON 2 MG/ML
4 INJECTION INTRAMUSCULAR; INTRAVENOUS AS NEEDED
Status: DISCONTINUED | OUTPATIENT
Start: 2018-02-26 | End: 2018-02-26

## 2018-02-26 RX ORDER — HYDROCODONE BITARTRATE AND ACETAMINOPHEN 5; 325 MG/1; MG/1
2 TABLET ORAL AS NEEDED
Status: DISCONTINUED | OUTPATIENT
Start: 2018-02-26 | End: 2018-02-26

## 2018-02-26 RX ORDER — CEPHALEXIN 500 MG/1
500 CAPSULE ORAL 4 TIMES DAILY
Qty: 12 CAPSULE | Refills: 0 | Status: SHIPPED | OUTPATIENT
Start: 2018-02-26 | End: 2018-03-01

## 2018-02-26 RX ORDER — MIDAZOLAM HYDROCHLORIDE 1 MG/ML
1 INJECTION INTRAMUSCULAR; INTRAVENOUS EVERY 5 MIN PRN
Status: DISCONTINUED | OUTPATIENT
Start: 2018-02-26 | End: 2018-02-26

## 2018-02-26 RX ORDER — INSULIN ASPART 100 [IU]/ML
INJECTION, SOLUTION INTRAVENOUS; SUBCUTANEOUS ONCE
Status: DISCONTINUED | OUTPATIENT
Start: 2018-02-26 | End: 2018-02-26

## 2018-02-26 RX ORDER — CEFAZOLIN SODIUM/WATER 2 G/20 ML
2 SYRINGE (ML) INTRAVENOUS ONCE
Status: DISCONTINUED | OUTPATIENT
Start: 2018-02-26 | End: 2018-02-26 | Stop reason: HOSPADM

## 2018-02-26 RX ORDER — DEXTROSE MONOHYDRATE 25 G/50ML
50 INJECTION, SOLUTION INTRAVENOUS
Status: DISCONTINUED | OUTPATIENT
Start: 2018-02-26 | End: 2018-02-26

## 2018-02-26 RX ORDER — CEFAZOLIN SODIUM 1 G/3ML
INJECTION, POWDER, FOR SOLUTION INTRAMUSCULAR; INTRAVENOUS
Status: DISCONTINUED | OUTPATIENT
Start: 2018-02-26 | End: 2018-02-26

## 2018-02-26 NOTE — BRIEF OP NOTE
Pre-Operative Diagnosis: Left carpal tunnel syndrome [G56.02]     Post-Operative Diagnosis: Left carpal tunnel syndrome [G56.02]     Procedure Performed:   Procedure(s):  LEFT CARPAL TUNNEL RELEASE    Surgeon(s) and Role:     * Raúl Singh MD -

## 2018-02-26 NOTE — ANESTHESIA POSTPROCEDURE EVALUATION
[address] [facility] Patient Status:  Hospital Outpatient Surgery   Age/Gender 61year old female MRN [id number]   Location [address] [place] [address] Attending Rocky Regalado MD   Hosp Day # 0 PCP Dee Solares MD

## 2018-02-26 NOTE — H&P
Meg Arango PCP:  Maria Antonia Yepez MD    1960 MRN [id number]      HISTORY OF PRESENT ILLNESS:  Meg Arango is a(n) 61year old female who is here for follow-up of left hand numbness.   She states since her last visit, her symptoms h release. Pt seen and examined. No changes since last visit, left hand symptoms worsening. All questions answered. Pt ready for surgery.

## 2018-02-26 NOTE — ANESTHESIA PREPROCEDURE EVALUATION
PRE-OP EVALUATION    Patient Name: Abilio Mendenhall    Pre-op Diagnosis: Left carpal tunnel syndrome [G56.02]    Procedure(s):  LEFT CARPAL TUNNEL RELEASE    Surgeon(s) and Role:     * Cinthya Brown MD - Primary    Pre-op vitals reviewed. Temp: 97. 8 units daily ) Disp: 30 mL Rfl: 1   Valsartan-Hydrochlorothiazide 160-12.5 MG Oral Tab Take 1 tablet by mouth daily.  Disp:  Rfl:        Allergies: Seasonal      Anesthesia Evaluation        Anesthetic Complications  (-) history of anesthetic complications

## 2018-02-26 NOTE — OPERATIVE REPORT
Operative Note    Patient Name: Donna Wilson    Preoperative Diagnosis: Left carpal tunnel syndrome [G56.02]    Postoperative Diagnosis: same    Primary Surgeon: Deb Galaviz    Assistant: Jairo Carpenter pa-c    Procedures: left carpal tunnel relea

## 2018-03-13 ENCOUNTER — OFFICE VISIT (OUTPATIENT)
Dept: SURGERY | Facility: CLINIC | Age: 58
End: 2018-03-13

## 2018-03-13 VITALS — SYSTOLIC BLOOD PRESSURE: 132 MMHG | DIASTOLIC BLOOD PRESSURE: 70 MMHG | HEART RATE: 80 BPM

## 2018-03-13 DIAGNOSIS — G56.02 CARPAL TUNNEL SYNDROME OF LEFT WRIST: Primary | ICD-10-CM

## 2018-03-13 PROCEDURE — 99024 POSTOP FOLLOW-UP VISIT: CPT | Performed by: NEUROLOGICAL SURGERY

## 2018-03-13 NOTE — PROGRESS NOTES
Neurosurgery Clinic Visit  3/13/2018    Clarice Kecia PCP:  Riki Mitchell MD    1960 MRN [id number]     HISTORY OF PRESENT ILLNESS:  Clarice Mcdonnell is a(n) 61year old female here for follow-up status post left carpal tunnel release  The pain

## 2018-03-13 NOTE — PATIENT INSTRUCTIONS
Refill policies:    • Allow 2-3 business days for refills; controlled substances may take longer.   • Contact your pharmacy at least 5 days prior to running out of medication and have them send an electronic request or submit request through the [facility] recommended that you have a procedure or additional testing performed. [facility]) will contact your insurance carrier to obtain pre-certification or prior authorization.     Unfortunately, [employer] has seen an increase in denial of paym

## 2018-03-13 NOTE — PROGRESS NOTES
Patient here for post op visit. Now has only numbness and cramping to wrist. No problems with incision. Taking Ibuprofen for pain.

## 2018-03-15 ENCOUNTER — TELEPHONE (OUTPATIENT)
Dept: SURGERY | Facility: CLINIC | Age: 58
End: 2018-03-15

## 2018-03-15 NOTE — TELEPHONE ENCOUNTER
informed patient about receiving disability forms & there is a $25 processing fee.  She is calling the [employer] to find out if this is the form that needs to be filled out before we start processing i

## 2018-03-16 NOTE — TELEPHONE ENCOUNTER
Pt called asking for completed STD forms to be faxed to [employer] [phone]. Pt PAID $25 fee   Awaiting call back from pt to request NA signature to release records. Paper work placed in folder for completion return to .  DO NOT FAX until RO

## 2018-03-19 NOTE — TELEPHONE ENCOUNTER
Spoke with pt informed NA needs to be completed before reords/paper work can be sent back.  Pt will be in office today to signed NA

## 2018-04-10 ENCOUNTER — OFFICE VISIT (OUTPATIENT)
Dept: SURGERY | Facility: CLINIC | Age: 58
End: 2018-04-10

## 2018-04-10 VITALS — SYSTOLIC BLOOD PRESSURE: 120 MMHG | HEART RATE: 88 BPM | DIASTOLIC BLOOD PRESSURE: 60 MMHG | RESPIRATION RATE: 18 BRPM

## 2018-04-10 DIAGNOSIS — G56.02 LEFT CARPAL TUNNEL SYNDROME: Primary | ICD-10-CM

## 2018-04-10 PROCEDURE — 99024 POSTOP FOLLOW-UP VISIT: CPT | Performed by: NEUROLOGICAL SURGERY

## 2018-04-10 RX ORDER — METHYLPREDNISOLONE 4 MG/1
TABLET ORAL
Qty: 1 PACKAGE | Refills: 0 | Status: SHIPPED | OUTPATIENT
Start: 2018-04-10 | End: 2018-05-08 | Stop reason: ALTCHOICE

## 2018-04-10 NOTE — PATIENT INSTRUCTIONS
Refill policies:    • Allow 2-3 business days for refills; controlled substances may take longer.   • Contact your pharmacy at least 5 days prior to running out of medication and have them send an electronic request or submit request through the [facility] for the entire amount billed. Precertification and Prior Authorizations  If your physician has recommended that you have a procedure or additional testing performed.   [facility] ([facility]) will contact your insurance carrier to obtain pr

## 2018-04-10 NOTE — PROGRESS NOTES
Neurosurgery Clinic Visit  4/10/2018    Fermín Sandoval PCP:  Ramy De León MD    1960 MRN [id number]     HISTORY OF PRESENT ILLNESS:  Fermín Sandoval is a(n) 61year old female who is here 6 weeks s/p left carpal tunnel release.   She has had si

## 2018-04-10 NOTE — PROGRESS NOTES
Patient here for post-op. Sx 2/26/18. ROM is still limited. Still some swelling. Still some numbness.

## 2018-04-12 ENCOUNTER — HOSPITAL ENCOUNTER (OUTPATIENT)
Dept: OCCUPATIONAL MEDICINE | Facility: HOSPITAL | Age: 58
Setting detail: THERAPIES SERIES
Discharge: HOME | End: 2018-04-12
Attending: PHYSICIAN ASSISTANT
Payer: COMMERCIAL

## 2018-04-12 DIAGNOSIS — G56.02 LEFT CARPAL TUNNEL SYNDROME: ICD-10-CM

## 2018-04-12 PROCEDURE — 97110 THERAPEUTIC EXERCISES: CPT

## 2018-04-12 PROCEDURE — 97166 OT EVAL MOD COMPLEX 45 MIN: CPT

## 2018-04-12 NOTE — PROGRESS NOTES
OCCUPATIONAL THERAPY UPPER EXTREMITY EVALUATION   Referring Physician: Dr. Martha Ya  Diagnosis: Carpal tunnel release     Date of Service: 4/12/2018     PATIENT SUMMARY   Chaz Chao is a 61year old y/o female who presents to therapy today with complaint fairly strong    CIRCUMFERENTIAL EDEMA (cm):  Right Wrist crease: 16.7  Left Wrist crease: 16.7  Right MCP: 19  Left MCP: 18    ROM: WNL KALI UE except below  Wrist   Flexion: R 60, L 52  Extension: R 60, L 50  Ulnar Deviation: R 25, L 22  Radial Deviation treatment options and has agreed to actively participate in planning and for this course of care. Thank you for your referral. Please co-sign or sign and return this letter via fax as soon as possible to [phone].  If you have any questions, please c

## 2018-04-16 ENCOUNTER — APPOINTMENT (OUTPATIENT)
Dept: OCCUPATIONAL MEDICINE | Age: 58
End: 2018-04-16
Attending: PHYSICIAN ASSISTANT
Payer: COMMERCIAL

## 2018-04-17 ENCOUNTER — TELEPHONE (OUTPATIENT)
Dept: SURGERY | Facility: CLINIC | Age: 58
End: 2018-04-17

## 2018-04-17 NOTE — TELEPHONE ENCOUNTER
Patient states she had CTR done by Dr. Lorrie Flores 2/26/18. She was started in OT after last visit on 4/10/18. Dr. Lorrie Flores also started her on oral steroids which helped a lot.  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

## 2018-04-17 NOTE — TELEPHONE ENCOUNTER
Informed patient that letter ready. Unable to reach [employer].  Asked letter to be sent to her work email, letter sent per patient request.

## 2018-04-19 ENCOUNTER — HOSPITAL ENCOUNTER (OUTPATIENT)
Dept: OCCUPATIONAL MEDICINE | Facility: HOSPITAL | Age: 58
Setting detail: THERAPIES SERIES
Discharge: HOME | End: 2018-04-19
Attending: PHYSICIAN ASSISTANT
Payer: COMMERCIAL

## 2018-04-19 PROCEDURE — 97035 APP MDLTY 1+ULTRASOUND EA 15: CPT

## 2018-04-19 PROCEDURE — 97110 THERAPEUTIC EXERCISES: CPT

## 2018-04-19 PROCEDURE — 97140 MANUAL THERAPY 1/> REGIONS: CPT

## 2018-04-19 NOTE — PROGRESS NOTES
Dx: Left carpal tunnel        Authorized # of Visits:  Malika Newberry MD visit: none scheduled  Fall Risk: standard         Precautions: n/a             Subjective:  \"The exercises and the steroids have made all the difference\"  Pt noting big improveme

## 2018-04-23 ENCOUNTER — HOSPITAL ENCOUNTER (OUTPATIENT)
Dept: OCCUPATIONAL MEDICINE | Facility: HOSPITAL | Age: 58
Setting detail: THERAPIES SERIES
Discharge: HOME | End: 2018-04-23
Attending: PHYSICIAN ASSISTANT
Payer: COMMERCIAL

## 2018-04-23 PROCEDURE — 97140 MANUAL THERAPY 1/> REGIONS: CPT

## 2018-04-23 PROCEDURE — 97110 THERAPEUTIC EXERCISES: CPT

## 2018-04-23 PROCEDURE — 97022 WHIRLPOOL THERAPY: CPT

## 2018-04-23 NOTE — PROGRESS NOTES
Dx: Left carpal tunnel        Authorized # of Visits:  [facility] - LANEY Newberry MD visit: none scheduled  Fall Risk: standard         Precautions: n/a             Subjective:   \"It's good until the morning, but then I wake up and it's stiff. \"  \"I was having a bold    Charges: Clemencia 1( 15 min) 1 MT ( 15 min), 1 WP   Total Timed Treatment: 45 min     Total Treatment Time: 45 min

## 2018-04-24 ENCOUNTER — TELEPHONE (OUTPATIENT)
Dept: SURGERY | Facility: CLINIC | Age: 58
End: 2018-04-24

## 2018-04-26 ENCOUNTER — HOSPITAL ENCOUNTER (OUTPATIENT)
Dept: OCCUPATIONAL MEDICINE | Facility: HOSPITAL | Age: 58
Setting detail: THERAPIES SERIES
Discharge: HOME | End: 2018-04-26
Attending: PHYSICIAN ASSISTANT
Payer: COMMERCIAL

## 2018-04-26 PROCEDURE — 97110 THERAPEUTIC EXERCISES: CPT

## 2018-04-26 PROCEDURE — 97140 MANUAL THERAPY 1/> REGIONS: CPT

## 2018-04-26 PROCEDURE — 97022 WHIRLPOOL THERAPY: CPT

## 2018-04-26 NOTE — PROGRESS NOTES
Dx: Left carpal tunnel        Authorized # of Visits:  Angie Olivares 150         Next MD visit: none scheduled  Fall Risk: standard         Precautions: n/a             Subjective: \"It was pretty sore after last visit. \"  \"I woke up with it really stiff yesterday. \ manipulation tasks Beige flex bar  -wrist flexion x 10  -wrist extension x 10  -bar bend x 10  -reverse bar bend x 10 Threaded pegboard using in-hand manipulation and [facility]                                           Skilled Services: HEP in bold    Charges:  Kesha Trevizo

## 2018-04-30 ENCOUNTER — HOSPITAL ENCOUNTER (OUTPATIENT)
Dept: OCCUPATIONAL MEDICINE | Facility: HOSPITAL | Age: 58
Setting detail: THERAPIES SERIES
Discharge: HOME | End: 2018-04-30
Attending: PHYSICIAN ASSISTANT
Payer: COMMERCIAL

## 2018-04-30 PROCEDURE — 97022 WHIRLPOOL THERAPY: CPT

## 2018-04-30 PROCEDURE — 97140 MANUAL THERAPY 1/> REGIONS: CPT

## 2018-04-30 PROCEDURE — 97110 THERAPEUTIC EXERCISES: CPT

## 2018-04-30 NOTE — PROGRESS NOTES
Dx: Left carpal tunnel        Authorized # of Visits:  [facility] - LANEY Newberry MD visit: none scheduled  Fall Risk: standard         Precautions: n/a             Subjective:   \"It's just stiff in the morning, still very tender depending on what you're doing. \" glides    Place/hold tasks PREs 1 lb wrist flexion x 20  Extension x 20  RD/UD x 20 Tendon glides    Palmar drags      Gentle /reposition yellow putty Slotted pegboard (2 peg) using in-hand manip and [employer]lex red full  x 20  Yellow each digit x

## 2018-05-03 ENCOUNTER — HOSPITAL ENCOUNTER (OUTPATIENT)
Dept: OCCUPATIONAL MEDICINE | Facility: HOSPITAL | Age: 58
Setting detail: THERAPIES SERIES
Discharge: HOME | End: 2018-05-03
Attending: PHYSICIAN ASSISTANT
Payer: COMMERCIAL

## 2018-05-03 PROCEDURE — 97140 MANUAL THERAPY 1/> REGIONS: CPT

## 2018-05-03 PROCEDURE — 97022 WHIRLPOOL THERAPY: CPT

## 2018-05-03 PROCEDURE — 97110 THERAPEUTIC EXERCISES: CPT

## 2018-05-03 NOTE — PROGRESS NOTES
Dx: Left carpal tunnel        Authorized # of Visits:  [facility] - DAVISON RICA         Next MD visit: none scheduled  Fall Risk: standard         Precautions: n/a             Subjective: \"Yesterday it felt great. It felt normal and there was no swelling.  Then this morning and wrist ROM to digits and wrist A/AA/PROM to digits and wrist     Tendon glides Tendon glides    Place/hold tasks PREs 1 lb wrist flexion x 20  Extension x 20  RD/UD x 20 Tendon glides    Palmar drags Tendon glides     Gentle /reposition yellow putty

## 2018-05-04 NOTE — TELEPHONE ENCOUNTER
Attached pt's disability forms to her appt on 5/7/18 to be filled out so she can bypass the $25 fee.

## 2018-05-07 ENCOUNTER — HOSPITAL ENCOUNTER (OUTPATIENT)
Dept: OCCUPATIONAL MEDICINE | Facility: HOSPITAL | Age: 58
Setting detail: THERAPIES SERIES
Discharge: HOME | End: 2018-05-07
Attending: PHYSICIAN ASSISTANT
Payer: COMMERCIAL

## 2018-05-07 PROCEDURE — 97140 MANUAL THERAPY 1/> REGIONS: CPT

## 2018-05-07 PROCEDURE — 97022 WHIRLPOOL THERAPY: CPT

## 2018-05-07 PROCEDURE — 97110 THERAPEUTIC EXERCISES: CPT

## 2018-05-07 NOTE — PROGRESS NOTES
Dx: Left carpal tunnel        Authorized # of Visits:  Riccardo Hamman         Next MD visit: none scheduled  Fall Risk: standard         Precautions: n/a              Progress Summary    Pt has attended 7, cancelled 0, and no shown 0 visits in Occupational Therapy. up with MD this week regarding return to work. Will follow MD recommendations regarding further treatment and d/c if no further orders received.      Patient/Family/Caregiver was advised of these findings, precautions, and treatment options and has agreed t manipulation tasks Beige flex bar  -wrist flexion x 10  -wrist extension x 10  -bar bend x 10  -reverse bar bend x 10 Threaded pegboard using in-hand manipulation and [employer]pper gold level 2 x 20  Gold level 1 tripod pinch x 20  Lateral pinch x 20 Johnson

## 2018-05-08 ENCOUNTER — OFFICE VISIT (OUTPATIENT)
Dept: SURGERY | Facility: CLINIC | Age: 58
End: 2018-05-08

## 2018-05-08 VITALS — SYSTOLIC BLOOD PRESSURE: 128 MMHG | HEART RATE: 88 BPM | DIASTOLIC BLOOD PRESSURE: 62 MMHG

## 2018-05-08 DIAGNOSIS — G56.02 LEFT CARPAL TUNNEL SYNDROME: Primary | ICD-10-CM

## 2018-05-08 PROCEDURE — 99024 POSTOP FOLLOW-UP VISIT: CPT | Performed by: PHYSICIAN ASSISTANT

## 2018-05-08 RX ORDER — MELOXICAM 15 MG/1
15 TABLET ORAL DAILY
Qty: 30 TABLET | Refills: 2 | Status: SHIPPED | OUTPATIENT
Start: 2018-05-08 | End: 2019-09-11 | Stop reason: ALTCHOICE

## 2018-05-08 NOTE — PROGRESS NOTES
Neurosurgery Clinic Visit  2018    Yvonne Bourgeois PCP:  Martha Machado MD    1960 MRN [id number]     HISTORY OF PRESENT ILLNESS:  Yvonne Bourgeois is a(n) 61year old female who is here 10 weeks s/p left carpal tunnel release.   She has had si

## 2018-05-08 NOTE — PATIENT INSTRUCTIONS
Refill policies:    • Allow 2-3 business days for refills; controlled substances may take longer.   • Contact your pharmacy at least 5 days prior to running out of medication and have them send an electronic request or submit request through the “request re entire amount billed. Precertification and Prior Authorizations: If your physician has recommended that you have a procedure or additional testing performed.   [facility]) will contact your insurance carrier to obtain pre-certi

## 2019-04-16 ENCOUNTER — HOSPITAL ENCOUNTER (OUTPATIENT)
Dept: BONE DENSITY | Age: 59
Discharge: HOME | End: 2019-04-16
Attending: FAMILY MEDICINE
Payer: COMMERCIAL

## 2019-04-16 ENCOUNTER — HOSPITAL ENCOUNTER (OUTPATIENT)
Dept: MAMMOGRAPHY | Age: 59
Discharge: HOME | End: 2019-04-16
Attending: FAMILY MEDICINE
Payer: COMMERCIAL

## 2019-04-16 DIAGNOSIS — M81.0 AGE-RELATED OSTEOPOROSIS WITHOUT CURRENT PATHOLOGICAL FRACTURE: ICD-10-CM

## 2019-04-16 DIAGNOSIS — M80.00XA AGE-RELATED OSTEOPOROSIS WITH CURRENT PATHOLOGICAL FRACTURE: ICD-10-CM

## 2019-04-16 DIAGNOSIS — Z12.31 ENCOUNTER FOR SCREENING MAMMOGRAM FOR MALIGNANT NEOPLASM OF BREAST: ICD-10-CM

## 2019-04-16 PROCEDURE — 77063 BREAST TOMOSYNTHESIS BI: CPT | Performed by: FAMILY MEDICINE

## 2019-04-16 PROCEDURE — 77067 SCR MAMMO BI INCL CAD: CPT | Performed by: FAMILY MEDICINE

## 2019-04-16 PROCEDURE — 77080 DXA BONE DENSITY AXIAL: CPT | Performed by: FAMILY MEDICINE

## 2019-07-10 PROBLEM — M75.42 ROTATOR CUFF IMPINGEMENT SYNDROME OF LEFT SHOULDER: Status: ACTIVE | Noted: 2019-07-10

## 2019-07-23 ENCOUNTER — OFFICE VISIT (OUTPATIENT)
Dept: PHYSICAL THERAPY | Age: 59
End: 2019-07-23
Attending: NURSE PRACTITIONER
Payer: COMMERCIAL

## 2019-07-23 DIAGNOSIS — M25.512 LEFT SHOULDER PAIN, UNSPECIFIED CHRONICITY: ICD-10-CM

## 2019-07-23 PROCEDURE — 97162 PT EVAL MOD COMPLEX 30 MIN: CPT | Performed by: PHYSICAL THERAPIST

## 2019-07-23 PROCEDURE — 97110 THERAPEUTIC EXERCISES: CPT | Performed by: PHYSICAL THERAPIST

## 2019-07-23 NOTE — PROGRESS NOTES
SHOULDER EVALUATION:    Referring Physician: Kuldip Ulrich    DX Code: Left shoulder pain, unspecified chronicity (M25.512)     PT DX: Left shoulder pain, unspecified chronicity (M25.512)    PCP: Mana Aguirre MD     Age: 60year old  Occupation: RN - I inferior glides, supine cane flex, sidelying ER, stand hor add self mobs  HEP:Handouts given  Pt. Education:Postural education  (Please close note - press F9 - and then reopen - click on 'Edit' - before going further).    ASSESSMENT & PLAN OF CARE:     Petr Ramirez be able to perform work related tasks with no pain. · Pt. will be independent with home exercise program and self management. Patient will be seen 2 x /week for 4-6 weeks or a total of 8-12 visits.    Treatment will include:  · Manual therapy to addres

## 2019-07-24 ENCOUNTER — OFFICE VISIT (OUTPATIENT)
Dept: PHYSICAL THERAPY | Age: 59
End: 2019-07-24
Attending: NURSE PRACTITIONER
Payer: COMMERCIAL

## 2019-07-24 PROCEDURE — 97110 THERAPEUTIC EXERCISES: CPT | Performed by: PHYSICAL THERAPIST

## 2019-07-24 PROCEDURE — 97140 MANUAL THERAPY 1/> REGIONS: CPT | Performed by: PHYSICAL THERAPIST

## 2019-07-24 NOTE — PROGRESS NOTES
Dx:  Left shoulder pain, unspecified chronicity (M25.512)         Authorized # of Visits:  8         Next MD visit: none scheduled  Fall Risk: standard         Precautions: n/a           Medication Changes since last visit?: No  Subjective: doing HEP - domi

## 2019-07-25 ENCOUNTER — APPOINTMENT (OUTPATIENT)
Dept: PHYSICAL THERAPY | Age: 59
End: 2019-07-25
Attending: NURSE PRACTITIONER
Payer: COMMERCIAL

## 2019-07-30 ENCOUNTER — OFFICE VISIT (OUTPATIENT)
Dept: PHYSICAL THERAPY | Age: 59
End: 2019-07-30
Attending: NURSE PRACTITIONER
Payer: COMMERCIAL

## 2019-07-30 PROCEDURE — 97110 THERAPEUTIC EXERCISES: CPT | Performed by: PHYSICAL THERAPIST

## 2019-07-30 PROCEDURE — 97140 MANUAL THERAPY 1/> REGIONS: CPT | Performed by: PHYSICAL THERAPIST

## 2019-07-30 NOTE — PROGRESS NOTES
Dx:  Left shoulder pain, unspecified chronicity (M25.512)         Authorized # of Visits:  8         Next MD visit: none scheduled  Fall Risk: standard         Precautions: n/a           Medication Changes since last visit?: No  Subjective: doing HEP - domi able to perform work related tasks with no pain. · Pt. will be independent with home exercise program and self managemen    Plan: pt to cont current HEP - focus on pain free movement, going on vacation, cont upon return.     Skilled Services: TE, MT, pt e

## 2019-08-01 ENCOUNTER — APPOINTMENT (OUTPATIENT)
Dept: PHYSICAL THERAPY | Age: 59
End: 2019-08-01
Attending: NURSE PRACTITIONER
Payer: COMMERCIAL

## 2019-08-07 ENCOUNTER — APPOINTMENT (OUTPATIENT)
Dept: PHYSICAL THERAPY | Age: 59
End: 2019-08-07
Attending: NURSE PRACTITIONER
Payer: COMMERCIAL

## 2019-08-12 ENCOUNTER — OFFICE VISIT (OUTPATIENT)
Dept: PHYSICAL THERAPY | Age: 59
End: 2019-08-12
Attending: NURSE PRACTITIONER
Payer: COMMERCIAL

## 2019-08-12 PROCEDURE — 97140 MANUAL THERAPY 1/> REGIONS: CPT | Performed by: PHYSICAL THERAPIST

## 2019-08-12 PROCEDURE — 97110 THERAPEUTIC EXERCISES: CPT | Performed by: PHYSICAL THERAPIST

## 2019-08-12 NOTE — PROGRESS NOTES
Dx:  Left shoulder pain, unspecified chronicity (M25.512)         Authorized # of Visits:  8         Next MD visit: none scheduled  Fall Risk: standard         Precautions: n/a           Medication Changes since last visit?: No  Subjective: doing HEP - domi range of motion of L shoulder IR BB to T8.    · Increase strength of L shoulder to 4+-5/5 to allow pt to return to prior level of function without pain, including reaching above head and lifting.   · LEISURE:  Pt will be able to return to previous level of

## 2019-08-15 ENCOUNTER — OFFICE VISIT (OUTPATIENT)
Dept: PHYSICAL THERAPY | Age: 59
End: 2019-08-15
Attending: NURSE PRACTITIONER
Payer: COMMERCIAL

## 2019-08-15 PROCEDURE — 97110 THERAPEUTIC EXERCISES: CPT | Performed by: PHYSICAL THERAPIST

## 2019-08-15 PROCEDURE — 97140 MANUAL THERAPY 1/> REGIONS: CPT | Performed by: PHYSICAL THERAPIST

## 2019-08-15 NOTE — PROGRESS NOTES
Dx:  Left shoulder pain, unspecified chronicity (M25.512)         Authorized # of Visits:  8         Next MD visit: none scheduled  Fall Risk: standard         Precautions: n/a           Medication Changes since last visit?: No  Subjective: doing HEP - awo sidelying ER w 1# dumbbell x 30         sidelying shoulder abd x 20                   Assessment: Symptoms decrease with repeated movement, responds to treatment, decreased soreness and greater ease of movement post session.       Goals:   to be reached in

## 2019-08-19 ENCOUNTER — OFFICE VISIT (OUTPATIENT)
Dept: PHYSICAL THERAPY | Age: 59
End: 2019-08-19
Attending: NURSE PRACTITIONER
Payer: COMMERCIAL

## 2019-08-19 PROCEDURE — 97110 THERAPEUTIC EXERCISES: CPT | Performed by: PHYSICAL THERAPIST

## 2019-08-19 PROCEDURE — 97140 MANUAL THERAPY 1/> REGIONS: CPT | Performed by: PHYSICAL THERAPIST

## 2019-08-19 NOTE — PROGRESS NOTES
Dx:  Left shoulder pain, unspecified chronicity (M25.512)         Authorized # of Visits:  8         Next MD visit: none scheduled  Fall Risk: standard         Precautions: n/a           Medication Changes since last visit?: No  Subjective: doing HEP -not min Supine man inf/post GH glides gr 2-3x 4 min         sidelying scap mob x 4 min     Seated clavicle inf glides x 3 min Seated clavicle inf glides x 3 min, dec, B Seated clavicle inf glides x 3 min, dec, B Seated clavicle inf glides x 3 min, dec, B Seate

## 2019-08-21 ENCOUNTER — OFFICE VISIT (OUTPATIENT)
Dept: PHYSICAL THERAPY | Age: 59
End: 2019-08-21
Attending: NURSE PRACTITIONER
Payer: COMMERCIAL

## 2019-08-21 PROCEDURE — 97140 MANUAL THERAPY 1/> REGIONS: CPT | Performed by: PHYSICAL THERAPIST

## 2019-08-21 PROCEDURE — 97110 THERAPEUTIC EXERCISES: CPT | Performed by: PHYSICAL THERAPIST

## 2019-08-21 NOTE — PROGRESS NOTES
Discharge note  Dx:  Left shoulder pain, unspecified chronicity (M25.512)         Authorized # of Visits:  8         Next MD visit: none scheduled  Fall Risk: standard         Precautions: n/a           Medication Changes since last visit?: No  Subjective: and flex x 30 ea Supine cane press, abd and flex x 30 ea    Supine man inf/post GH glides gr 2-3x 8 min Supine man inf/post GH glides gr 2-3x 8 min Supine man inf/post GH glides gr 2-3x 8 min Supine man inf/post GH glides gr 2-3x 8 min Supine man inf/post

## 2019-08-28 ENCOUNTER — OFFICE VISIT (OUTPATIENT)
Dept: PHYSICAL THERAPY | Age: 59
End: 2019-08-28
Attending: NURSE PRACTITIONER
Payer: COMMERCIAL

## 2019-08-28 PROCEDURE — 97140 MANUAL THERAPY 1/> REGIONS: CPT | Performed by: PHYSICAL THERAPIST

## 2019-08-28 PROCEDURE — 97110 THERAPEUTIC EXERCISES: CPT | Performed by: PHYSICAL THERAPIST

## 2019-08-28 NOTE — PROGRESS NOTES
Discharge note  Dx:  Left shoulder pain, unspecified chronicity (M25.512)         Authorized # of Visits:  12         Next MD visit: none scheduled  Fall Risk: standard         Precautions: n/a           Medication Changes since last visit?: No  Subjective Supine cane press, abd and flex x 30 ea Supine cane press, abd and flex x 30 ea Supine cane press, abd and flex x 30 ea Supine cane press, abd and flex x 30 ea Supine cane press, abd and flex x 30 ea Supine cane press, abd and flex x 30 ea   Supine man inf Services: TE, MT, pt ed    Charges: TE2, MT1     Total Timed Treatment: 45 min  Total Treatment Time: 47 min

## 2019-09-05 ENCOUNTER — OFFICE VISIT (OUTPATIENT)
Dept: PHYSICAL THERAPY | Age: 59
End: 2019-09-05
Attending: NURSE PRACTITIONER
Payer: COMMERCIAL

## 2019-09-05 PROCEDURE — 97110 THERAPEUTIC EXERCISES: CPT | Performed by: PHYSICAL THERAPIST

## 2019-09-05 PROCEDURE — 97140 MANUAL THERAPY 1/> REGIONS: CPT | Performed by: PHYSICAL THERAPIST

## 2019-09-05 NOTE — PROGRESS NOTES
Discharge note  Dx:  Left shoulder pain, unspecified chronicity (M25.512)         Authorized # of Visits:  12         Next MD visit: none scheduled  Fall Risk: standard         Precautions: n/a           Medication Changes since last visit?: No  Subjective discomfort with IR BB, inc ROM Self shoulder ext mobs 2x 10, dec discomfort with IR BB, inc ROM Self shoulder ext mobs 2x 10, dec discomfort with IR BB, inc ROM Self shoulder ext mobs 2x 10, dec discomfort with IR BB, inc ROM     Supine cane press, abd and pain, including reaching above head and lifting. · LEISURE:  Pt will be able to return to previous level of function for leisure activities  · Pt. will be able to perform ADLs with no pain. · Pt will be able to perform work related tasks with no pain.

## 2019-09-10 ENCOUNTER — OFFICE VISIT (OUTPATIENT)
Dept: PHYSICAL THERAPY | Age: 59
End: 2019-09-10
Attending: NURSE PRACTITIONER
Payer: COMMERCIAL

## 2019-09-10 PROCEDURE — 97140 MANUAL THERAPY 1/> REGIONS: CPT | Performed by: PHYSICAL THERAPIST

## 2019-09-10 PROCEDURE — 97110 THERAPEUTIC EXERCISES: CPT | Performed by: PHYSICAL THERAPIST

## 2019-09-10 NOTE — PROGRESS NOTES
Discharge note  Dx:  Left shoulder pain, unspecified chronicity (M25.512)         Authorized # of Visits:  12         Next MD visit: none scheduled  Fall Risk: standard         Precautions: n/a           Medication Changes since last visit?: No  Subjective discomfort with IR BB, inc ROM Self shoulder ext mobs 2x 10, dec discomfort with IR BB, inc ROM Self shoulder ext mobs 2x 10, dec discomfort with IR BB, inc ROM Self shoulder ext mobs 2x 10, dec discomfort with IR BB, inc ROM Self shoulder ext mobs 2x 10, sidelying shoulder abd x 20                    Assessment: Symptoms decrease with treatment, pt remains sensitive and tight, encouraged to cont hourly movement to prevent further stiffening. Goals:   to be reached in 8-12 visits.   · Pt. will report decr

## 2019-09-11 PROBLEM — E11.618 ADHESIVE CAPSULITIS OF LEFT SHOULDER ASSOCIATED WITH TYPE 2 DIABETES MELLITUS (HCC): Status: ACTIVE | Noted: 2019-09-11

## 2019-09-11 PROBLEM — E11.618 ADHESIVE CAPSULITIS OF LEFT SHOULDER ASSOCIATED WITH TYPE 2 DIABETES MELLITUS  (HCC): Status: ACTIVE | Noted: 2019-09-11

## 2019-09-11 PROBLEM — M75.02 ADHESIVE CAPSULITIS OF LEFT SHOULDER ASSOCIATED WITH TYPE 2 DIABETES MELLITUS  (HCC): Status: ACTIVE | Noted: 2019-09-11

## 2019-09-11 PROBLEM — M75.112 PARTIAL NONTRAUMATIC TEAR OF LEFT ROTATOR CUFF: Status: ACTIVE | Noted: 2019-09-11

## 2019-09-11 PROBLEM — M75.02 ADHESIVE CAPSULITIS OF LEFT SHOULDER ASSOCIATED WITH TYPE 2 DIABETES MELLITUS: Status: ACTIVE | Noted: 2019-09-11

## 2019-09-11 PROBLEM — E11.618 ADHESIVE CAPSULITIS OF LEFT SHOULDER ASSOCIATED WITH TYPE 2 DIABETES MELLITUS: Status: ACTIVE | Noted: 2019-09-11

## 2019-09-11 PROBLEM — M75.02 ADHESIVE CAPSULITIS OF LEFT SHOULDER ASSOCIATED WITH TYPE 2 DIABETES MELLITUS (HCC): Status: ACTIVE | Noted: 2019-09-11

## 2019-09-12 ENCOUNTER — OFFICE VISIT (OUTPATIENT)
Dept: PHYSICAL THERAPY | Age: 59
End: 2019-09-12
Attending: NURSE PRACTITIONER
Payer: COMMERCIAL

## 2019-09-12 PROCEDURE — 97140 MANUAL THERAPY 1/> REGIONS: CPT | Performed by: PHYSICAL THERAPIST

## 2019-09-12 PROCEDURE — 97110 THERAPEUTIC EXERCISES: CPT | Performed by: PHYSICAL THERAPIST

## 2019-09-12 NOTE — PROGRESS NOTES
Discharge note  Dx:  Left shoulder pain, unspecified chronicity (M25.512)  Adhesive capsulitis      Authorized # of Visits:  24         Next MD visit: none scheduled  Fall Risk: standard         Precautions: n/a           Medication Changes since last visi Bicep curl 4# dumbbells, 3x10   Self shoulder ext mobs x 10, dec discomfort with IR BB Self shoulder ext mobs 2x 10, dec discomfort with IR BB, inc ROM Self shoulder ext mobs 2x 10, dec discomfort with IR BB, inc ROM Self shoulder ext mobs 2x 10, dec disco dumbbell x 30 sidelying ER w 1# dumbbell x 30 sidelying ER w 1# dumbbell x 30 sidelying ER w 1# dumbbell x 30 deferred sidelying ER w 0#  x 30 sidelying ER w 0#  x 30      sidelying shoulder abd x 20 sidelying shoulder abd x 20 sidelying shoulder abd x 20

## 2019-09-17 ENCOUNTER — OFFICE VISIT (OUTPATIENT)
Dept: PHYSICAL THERAPY | Age: 59
End: 2019-09-17
Attending: NURSE PRACTITIONER
Payer: COMMERCIAL

## 2019-09-17 PROCEDURE — 97140 MANUAL THERAPY 1/> REGIONS: CPT | Performed by: PHYSICAL THERAPIST

## 2019-09-17 PROCEDURE — 97110 THERAPEUTIC EXERCISES: CPT | Performed by: PHYSICAL THERAPIST

## 2019-09-17 NOTE — PROGRESS NOTES
Discharge note  Dx:  Left shoulder pain, unspecified chronicity (M25.512)  Adhesive capsulitis      Authorized # of Visits:  24         Next MD visit: none scheduled  Fall Risk: standard         Precautions: n/a           Medication Changes since last visi shoulder ext mobs 2x 10, dec discomfort with IR BB, inc ROM Self shoulder ext mobs 2x 10, dec discomfort with IR BB, inc ROM Self shoulder ext mobs 2x 10, dec discomfort with IR BB, inc ROM Self shoulder ext mobs 2x 10, dec discomfort with IR BB, inc ROM S 9/12/19  to be reached in 16-24 visits.   · Pt. will report decreased pain from 10/10 to 3/10 at worst.  · Increase active range of motion of L shoulder IR BB to T12    · Increase strength of L shoulder to 4+-5/5 to allow pt to return to prior level of func

## 2019-09-18 ENCOUNTER — APPOINTMENT (OUTPATIENT)
Dept: PHYSICAL THERAPY | Age: 59
End: 2019-09-18
Attending: NURSE PRACTITIONER
Payer: COMMERCIAL

## 2019-09-24 ENCOUNTER — APPOINTMENT (OUTPATIENT)
Dept: PHYSICAL THERAPY | Age: 59
End: 2019-09-24
Attending: NURSE PRACTITIONER
Payer: COMMERCIAL

## 2019-09-26 ENCOUNTER — OFFICE VISIT (OUTPATIENT)
Dept: PHYSICAL THERAPY | Age: 59
End: 2019-09-26
Attending: NURSE PRACTITIONER
Payer: COMMERCIAL

## 2019-09-26 PROCEDURE — 97140 MANUAL THERAPY 1/> REGIONS: CPT | Performed by: PHYSICAL THERAPIST

## 2019-09-26 PROCEDURE — 97110 THERAPEUTIC EXERCISES: CPT | Performed by: PHYSICAL THERAPIST

## 2019-09-26 NOTE — PROGRESS NOTES
Discharge note  Dx:  Left shoulder pain, unspecified chronicity (M25.512)  Adhesive capsulitis      Authorized # of Visits:  24         Next MD visit: none scheduled  Fall Risk: standard         Precautions: n/a           Medication Changes since last visi 10, dec discomfort with IR BB, inc ROM Self shoulder ext mobs 2x 10, dec discomfort with IR BB, inc ROM Self shoulder ext mobs 2x 10, dec discomfort with IR BB, inc ROM Self shoulder ext mobs 2x 10, dec discomfort with IR BB, inc ROM Self shoulder ext mobs sidelying shoulder abd x 20 sidelying shoulder abd x 20 sidelying shoulder abd x 20 sidelying shoulder abd x 20 sidelying shoulder abd x 20 sidelying shoulder abd x 20                     Assessment: Symptoms decrease with treatment, pt less sensitive, rem

## 2019-10-02 ENCOUNTER — OFFICE VISIT (OUTPATIENT)
Dept: PHYSICAL THERAPY | Age: 59
End: 2019-10-02
Attending: NURSE PRACTITIONER
Payer: COMMERCIAL

## 2019-10-02 PROCEDURE — 97140 MANUAL THERAPY 1/> REGIONS: CPT | Performed by: PHYSICAL THERAPIST

## 2019-10-02 PROCEDURE — 97110 THERAPEUTIC EXERCISES: CPT | Performed by: PHYSICAL THERAPIST

## 2019-10-02 NOTE — PROGRESS NOTES
Discharge note  Dx:  Left shoulder pain, unspecified chronicity (M25.512)  Adhesive capsulitis      Authorized # of Visits:  24         Next MD visit: none scheduled  Fall Risk: standard         Precautions: n/a           Medication Changes since last visi shoulder ext mobs 2x 10, dec discomfort with IR BB, inc ROM       wall ladder flex x 2 min to # 31 wall ladder flex x 2 min to # 31       Wall wash with towel CW/CCW x 2 min  Wall wash with towel CW/CCW x 2 min   Supine cane press, abd and flex x 30 ea Sup HEP - use pulleys multiple times daily and IR BB hourlyt. Cont an additional 10 visits,  1x/week to monitor and progress as able over time.      Skilled Services: TE, MT, pt ed    Charges: TE2, MT1     Total Timed Treatment: 49 min  Total Treatment Time: 46

## 2019-10-10 ENCOUNTER — OFFICE VISIT (OUTPATIENT)
Dept: PHYSICAL THERAPY | Age: 59
End: 2019-10-10
Attending: NURSE PRACTITIONER
Payer: COMMERCIAL

## 2019-10-10 PROCEDURE — 97140 MANUAL THERAPY 1/> REGIONS: CPT | Performed by: PHYSICAL THERAPIST

## 2019-10-10 PROCEDURE — 97110 THERAPEUTIC EXERCISES: CPT | Performed by: PHYSICAL THERAPIST

## 2019-10-10 NOTE — PROGRESS NOTES
Discharge note  Dx:  Left shoulder pain, unspecified chronicity (M25.512)  Adhesive capsulitis      Authorized # of Visits:  24         Next MD visit: none scheduled  Fall Risk: standard         Precautions: n/a           Medication Changes since last visi 10, dec discomfort with IR BB, inc ROM         wall ladder flex x 2 min to # 31 wall ladder flex x 2 min to # 31 wall ladder flex x 2 min to # 31         Wall wash with towel CW/CCW x 2 min  Wall wash with towel CW/CCW x 2 min Wall wash with towel CW/CCW x leisure activities  · Pt. will be able to perform ADLs with no pain. · Pt will be able to perform work related tasks with no pain.    · Pt. will be independent with home exercise program and self managemen    Plan: pt to cont HEP - use pulleys multiple lee

## 2019-10-15 ENCOUNTER — OFFICE VISIT (OUTPATIENT)
Dept: PHYSICAL THERAPY | Age: 59
End: 2019-10-15
Attending: NURSE PRACTITIONER
Payer: COMMERCIAL

## 2019-10-15 PROCEDURE — 97140 MANUAL THERAPY 1/> REGIONS: CPT | Performed by: PHYSICAL THERAPIST

## 2019-10-15 PROCEDURE — 97110 THERAPEUTIC EXERCISES: CPT | Performed by: PHYSICAL THERAPIST

## 2019-10-15 NOTE — PROGRESS NOTES
Discharge note  Dx:  Left shoulder pain, unspecified chronicity (M25.512)  Adhesive capsulitis      Authorized # of Visits:  24         Next MD visit: none scheduled  Fall Risk: standard         Precautions: n/a           Medication Changes since last visi ROM Self shoulder ext mobs 2x 10, dec discomfort with IR BB, inc ROM Self shoulder ext mobs 3x 10, dec discomfort with IR BB, inc ROM        wall ladder flex x 2 min to # 31 wall ladder flex x 2 min to # 31 wall ladder flex x 2 min to # 31 -        Wall wa BB to T12    · Increase strength of L shoulder to 4+-5/5 to allow pt to return to prior level of function without pain, including reaching above head and lifting.   · LEISURE:  Pt will be able to return to previous level of function for leisure activities

## 2020-12-31 LAB — CYTOLOGY CVX/VAG DOC THIN PREP: NORMAL

## 2021-07-12 LAB — COLONOSCOPY STUDY: NORMAL

## 2021-09-11 NOTE — TELEPHONE ENCOUNTER
[employer] asking for return to work status for STD forms, if needed please fax progress notes stating reason pt is off work to [phone]; or call Santiago @ [employer] for verbal @ [phone] uncorrected

## 2021-10-22 LAB — HM DILATED EYE EXAM: NORMAL

## 2021-12-21 ENCOUNTER — HOSPITAL ENCOUNTER (INPATIENT)
Facility: HOSPITAL | Age: 61
LOS: 7 days | Discharge: HOME HEALTH | DRG: 236 | End: 2021-12-28
Attending: THORACIC SURGERY (CARDIOTHORACIC VASCULAR SURGERY) | Admitting: THORACIC SURGERY (CARDIOTHORACIC VASCULAR SURGERY)
Payer: COMMERCIAL

## 2021-12-21 ENCOUNTER — TELEPHONE (OUTPATIENT)
Dept: CARDIOLOGY UNIT | Facility: HOSPITAL | Age: 61
End: 2021-12-21

## 2021-12-21 DIAGNOSIS — J90 PLEURAL EFFUSION: Primary | ICD-10-CM

## 2021-12-21 PROBLEM — I25.10 CAD (CORONARY ARTERY DISEASE): Status: ACTIVE | Noted: 2021-12-21

## 2021-12-21 PROBLEM — E11.9 TYPE 2 DIABETES MELLITUS WITHOUT COMPLICATION, WITH LONG-TERM CURRENT USE OF INSULIN (HCC): Status: ACTIVE | Noted: 2017-04-06

## 2021-12-21 PROBLEM — Z79.4 TYPE 2 DIABETES MELLITUS WITHOUT COMPLICATION, WITH LONG-TERM CURRENT USE OF INSULIN (HCC): Status: ACTIVE | Noted: 2017-04-06

## 2021-12-21 PROCEDURE — 99254 IP/OBS CNSLTJ NEW/EST MOD 60: CPT | Performed by: HOSPITALIST

## 2021-12-21 RX ORDER — ONDANSETRON 2 MG/ML
4 INJECTION INTRAMUSCULAR; INTRAVENOUS EVERY 6 HOURS PRN
Status: DISCONTINUED | OUTPATIENT
Start: 2021-12-21 | End: 2021-12-23

## 2021-12-21 RX ORDER — HEPARIN SODIUM AND DEXTROSE 10000; 5 [USP'U]/100ML; G/100ML
INJECTION INTRAVENOUS CONTINUOUS
Status: DISCONTINUED | OUTPATIENT
Start: 2021-12-21 | End: 2021-12-23

## 2021-12-21 RX ORDER — VALSARTAN AND HYDROCHLOROTHIAZIDE 160; 12.5 MG/1; MG/1
1 TABLET, FILM COATED ORAL DAILY
Status: DISCONTINUED | OUTPATIENT
Start: 2021-12-21 | End: 2021-12-21

## 2021-12-21 RX ORDER — ATORVASTATIN CALCIUM 20 MG/1
20 TABLET, FILM COATED ORAL NIGHTLY
Refills: 1 | Status: DISCONTINUED | OUTPATIENT
Start: 2021-12-21 | End: 2021-12-21

## 2021-12-21 RX ORDER — PROCHLORPERAZINE EDISYLATE 5 MG/ML
5 INJECTION INTRAMUSCULAR; INTRAVENOUS EVERY 8 HOURS PRN
Status: DISCONTINUED | OUTPATIENT
Start: 2021-12-21 | End: 2021-12-23

## 2021-12-21 RX ORDER — ACETAMINOPHEN 325 MG/1
650 TABLET ORAL EVERY 6 HOURS PRN
Status: DISCONTINUED | OUTPATIENT
Start: 2021-12-21 | End: 2021-12-23

## 2021-12-21 RX ORDER — DEXTROSE MONOHYDRATE 25 G/50ML
50 INJECTION, SOLUTION INTRAVENOUS
Status: DISCONTINUED | OUTPATIENT
Start: 2021-12-21 | End: 2021-12-23

## 2021-12-21 RX ORDER — AMLODIPINE BESYLATE 5 MG/1
10 TABLET ORAL DAILY
Status: DISCONTINUED | OUTPATIENT
Start: 2021-12-22 | End: 2021-12-23

## 2021-12-21 RX ORDER — AMLODIPINE BESYLATE 10 MG/1
10 TABLET ORAL DAILY
COMMUNITY
End: 2021-12-28

## 2021-12-21 NOTE — PROGRESS NOTES
Per Dr. Waldo Gonzalez, pt currently at [facility] in need of CABG, cards Dr. aMry Carmen Jackson, pt requesting transfer to [facility], pt is former CNICU RN, d/w [place] in transfer center, face sheet faxed.   Will consult [facility] cards and [facility] hospitalist on pts arrival, d/w patient she

## 2021-12-22 ENCOUNTER — ANESTHESIA EVENT (OUTPATIENT)
Dept: CARDIAC SURGERY | Facility: HOSPITAL | Age: 61
DRG: 236 | End: 2021-12-22
Payer: COMMERCIAL

## 2021-12-22 ENCOUNTER — APPOINTMENT (OUTPATIENT)
Dept: GENERAL RADIOLOGY | Facility: HOSPITAL | Age: 61
DRG: 236 | End: 2021-12-22
Attending: THORACIC SURGERY (CARDIOTHORACIC VASCULAR SURGERY)
Payer: COMMERCIAL

## 2021-12-22 PROCEDURE — 71045 X-RAY EXAM CHEST 1 VIEW: CPT | Performed by: THORACIC SURGERY (CARDIOTHORACIC VASCULAR SURGERY)

## 2021-12-22 PROCEDURE — 99232 SBSQ HOSP IP/OBS MODERATE 35: CPT | Performed by: INTERNAL MEDICINE

## 2021-12-22 RX ORDER — ASPIRIN 81 MG/1
81 TABLET ORAL DAILY
Status: DISCONTINUED | OUTPATIENT
Start: 2021-12-22 | End: 2021-12-28

## 2021-12-22 RX ORDER — ATORVASTATIN CALCIUM 40 MG/1
40 TABLET, FILM COATED ORAL NIGHTLY
Status: DISCONTINUED | OUTPATIENT
Start: 2021-12-22 | End: 2021-12-28

## 2021-12-22 RX ORDER — SODIUM CHLORIDE 9 MG/ML
INJECTION, SOLUTION INTRAVENOUS
Status: COMPLETED | OUTPATIENT
Start: 2021-12-23 | End: 2021-12-22

## 2021-12-22 NOTE — CONSULTS
[facility] HOSPITAL[facility]  History and Physical     Deniz Alejandroángels Patient Status:  Inpatient    1960 MRN [id number]   [facility] [place] 8NE-A Attending Usha Figueredo MD   Hosp Day # 0 PCP Asya Colindres MD     Chief Complaint: Chest pain    Hi THE SKIN, Disp: 60 mL, Rfl: 2  Insulin Pen Needle (BD PEN NEEDLE MARYSE U/F) 32G X 4 MM Does not apply Misc, Inject 4 times daily, Disp: 400 each, Rfl: 0  ONETOUCH VERIO In Vitro Strip, 1 strip by In Vitro route 4 (four) times daily. , Disp: 400 strip, Rfl: 0 Musculoskeletal: Moves all extremities. Extremities: No edema or cyanosis. Integument: No rashes or lesions. Psychiatric: Appropriate mood and affect.       Diagnostic Data:      Labs:  No results for input(s): WBC, HGB, MCV, PLT, BAND, INR in the las

## 2021-12-22 NOTE — PAYOR COMM NOTE
--------------  ADMISSION REVIEW     Payor: [address] [facility]  Subscriber #:  [id number]  Authorization Number: [id number]    Admit date: 12/21/21  Admit time:  6:12 PM       History and Physical   Chief Complaint: Chest pain   History of Present Illn was consequently trx to [facility] for CABG with Dr. Paris Arambula.     At the time of my visit, she was feeling well with no active CP or cardiac symptoms.  No reported SOB, STALLWORTH, palpitations.      Assessment:    · NSTEMI (Cath showed multi-vessel CAD) for plan

## 2021-12-22 NOTE — ANESTHESIA PREPROCEDURE EVALUATION
PRE-OP EVALUATION    Patient Name: Chaz Chao    Admit Diagnosis: CABG  CAD (coronary artery disease)    Pre-op Diagnosis: coronary artery disease    CORONARY ARTERY BYPASS GRAFT    Anesthesia Procedure: CORONARY ARTERY BYPASS GRAFT (N/A )    Surgeon( up to 30 units daily, per sliding scale., Disp: 15 mL, Rfl: 1, 12/21/2021 at Unknown time  TRESIBA FLEXTOUCH 100 UNIT/ML Subcutaneous Solution Pen-injector, INJECT 56 UNITS DAILY UNDER THE SKIN, Disp: 60 mL, Rfl: 2  Insulin Pen Needle (BD PEN NEEDLE MARYSE U History    Tobacco Use      Smoking status: Current Some Day Smoker        Years: 37.00      Smokeless tobacco: Never Used      Tobacco comment: 1-2 cigarettes/day    Alcohol use: Yes      Comment: social, wine only      Drug use: No     Available pre-op l

## 2021-12-22 NOTE — PROGRESS NOTES
[facility]     Hospitalist Progress Note     Ange Mata Patient Status:  Inpatient    1960 MRN [id number]   [facility] 8NE-A Attending Lisa Cabral MD   Hosp Day # 1 PCP Govind Pena MD     Chief Complaint: [place] edema.    Diagnostic Data:    Labs:  Recent Labs   Lab 12/21/21 1914 12/22/21  0658   WBC 9.8 8.2   HGB 12.0 12.2   MCV 87.5 86.9   .0 186.0       Recent Labs   Lab 12/21/21 1914 12/22/21  0658   * 77   BUN 18 16   CREATSERUM 0.71 0.47*   G and patient's  at bedside      Plan of care: As above.   Await CABG    Plan of care discussed with patient, patient's  at bedside    Kp Esqueda MD    Supplementary Documentation:     Quality:  · DVT Prophylaxis: On heparin drip  · CODE stat

## 2021-12-22 NOTE — CM/SW NOTE
Met with patient to discuss discharge planning as patient is scheduled for CABG surgery 12/23/21.   Patient resides with her spouse Kunal Domínguez of 45 years in a tri level home [place]     12/22/21 1700   CM/SW Referral Data   Referral Source Physician;

## 2021-12-22 NOTE — CONSULTS
[facility]  Consultation    Isela Castellanos Patient Status:  Inpatient    1960 MRN [id number]   [facility] 8NE-A Attending Caden Darby MD   Hosp Day # 1 PCP Marichuy Hallman MD       Reason for consultation;  Multi-vessel CAD Hypertension Father    • Hypertension Mother    • Diabetes Maternal Grandmother       reports that she has been smoking. She has smoked for the past 37.00 years. She has never used smokeless tobacco. She reports current alcohol use.  She reports that she do

## 2021-12-22 NOTE — PROGRESS NOTES
Patient arrived as a direct admit from [facility] for CABG with Dr Violette Healy. Presented to [facility] with CP and jaw pain x3 days, had an angiogram that showed multivessel disease via radial approach. Transferred here for cabg soon with Dr Crow Vargas.   NO

## 2021-12-22 NOTE — PLAN OF CARE
Alert & oriented x4. Room air, o2 saturations in 90s. Normal sinus rhythm on tele monitor. Heparin gtt infusing per ACS protocol. Continent, voiding without difficulty. Denies pain. Up ad ellyn. Spouse at bedside. KANDICE accuchecks.  Dr. Porter Savage consult in AM.     P

## 2021-12-23 ENCOUNTER — ANESTHESIA (OUTPATIENT)
Dept: CARDIAC SURGERY | Facility: HOSPITAL | Age: 61
DRG: 236 | End: 2021-12-23
Payer: COMMERCIAL

## 2021-12-23 ENCOUNTER — APPOINTMENT (OUTPATIENT)
Dept: GENERAL RADIOLOGY | Facility: HOSPITAL | Age: 61
DRG: 236 | End: 2021-12-23
Attending: PHYSICIAN ASSISTANT
Payer: COMMERCIAL

## 2021-12-23 PROBLEM — Z95.1 STATUS POST CORONARY ARTERY BYPASS GRAFT: Status: ACTIVE | Noted: 2021-12-23

## 2021-12-23 PROCEDURE — 02100Z9 BYPASS CORONARY ARTERY, ONE ARTERY FROM LEFT INTERNAL MAMMARY, OPEN APPROACH: ICD-10-PCS | Performed by: THORACIC SURGERY (CARDIOTHORACIC VASCULAR SURGERY)

## 2021-12-23 PROCEDURE — 36430 TRANSFUSION BLD/BLD COMPNT: CPT | Performed by: ANESTHESIOLOGY

## 2021-12-23 PROCEDURE — 06BQ4ZZ EXCISION OF LEFT SAPHENOUS VEIN, PERCUTANEOUS ENDOSCOPIC APPROACH: ICD-10-PCS | Performed by: THORACIC SURGERY (CARDIOTHORACIC VASCULAR SURGERY)

## 2021-12-23 PROCEDURE — 30233N1 TRANSFUSION OF NONAUTOLOGOUS RED BLOOD CELLS INTO PERIPHERAL VEIN, PERCUTANEOUS APPROACH: ICD-10-PCS | Performed by: THORACIC SURGERY (CARDIOTHORACIC VASCULAR SURGERY)

## 2021-12-23 PROCEDURE — 76942 ECHO GUIDE FOR BIOPSY: CPT | Performed by: ANESTHESIOLOGY

## 2021-12-23 PROCEDURE — 5A1221Z PERFORMANCE OF CARDIAC OUTPUT, CONTINUOUS: ICD-10-PCS | Performed by: THORACIC SURGERY (CARDIOTHORACIC VASCULAR SURGERY)

## 2021-12-23 PROCEDURE — 021209W BYPASS CORONARY ARTERY, THREE ARTERIES FROM AORTA WITH AUTOLOGOUS VENOUS TISSUE, OPEN APPROACH: ICD-10-PCS | Performed by: THORACIC SURGERY (CARDIOTHORACIC VASCULAR SURGERY)

## 2021-12-23 PROCEDURE — 99232 SBSQ HOSP IP/OBS MODERATE 35: CPT | Performed by: INTERNAL MEDICINE

## 2021-12-23 PROCEDURE — 93312 ECHO TRANSESOPHAGEAL: CPT | Performed by: ANESTHESIOLOGY

## 2021-12-23 PROCEDURE — 71045 X-RAY EXAM CHEST 1 VIEW: CPT | Performed by: PHYSICIAN ASSISTANT

## 2021-12-23 RX ORDER — ROCURONIUM BROMIDE 10 MG/ML
INJECTION, SOLUTION INTRAVENOUS AS NEEDED
Status: DISCONTINUED | OUTPATIENT
Start: 2021-12-23 | End: 2021-12-23 | Stop reason: SURG

## 2021-12-23 RX ORDER — DEXTROSE MONOHYDRATE 25 G/50ML
50 INJECTION, SOLUTION INTRAVENOUS
Status: DISCONTINUED | OUTPATIENT
Start: 2021-12-23 | End: 2021-12-28

## 2021-12-23 RX ORDER — ONDANSETRON 2 MG/ML
4 INJECTION INTRAMUSCULAR; INTRAVENOUS EVERY 6 HOURS PRN
Status: DISCONTINUED | OUTPATIENT
Start: 2021-12-23 | End: 2021-12-28

## 2021-12-23 RX ORDER — IPRATROPIUM BROMIDE AND ALBUTEROL SULFATE 2.5; .5 MG/3ML; MG/3ML
3 SOLUTION RESPIRATORY (INHALATION) EVERY 4 HOURS PRN
Status: DISCONTINUED | OUTPATIENT
Start: 2021-12-23 | End: 2021-12-28

## 2021-12-23 RX ORDER — DIPHENHYDRAMINE HYDROCHLORIDE 50 MG/ML
12.5 INJECTION INTRAMUSCULAR; INTRAVENOUS EVERY 4 HOURS PRN
Status: DISCONTINUED | OUTPATIENT
Start: 2021-12-23 | End: 2021-12-24

## 2021-12-23 RX ORDER — ALBUTEROL SULFATE 90 UG/1
AEROSOL, METERED RESPIRATORY (INHALATION) AS NEEDED
Status: DISCONTINUED | OUTPATIENT
Start: 2021-12-23 | End: 2021-12-23 | Stop reason: SURG

## 2021-12-23 RX ORDER — DEXTROSE AND SODIUM CHLORIDE 5; .45 G/100ML; G/100ML
INJECTION, SOLUTION INTRAVENOUS CONTINUOUS
Status: DISCONTINUED | OUTPATIENT
Start: 2021-12-23 | End: 2021-12-24

## 2021-12-23 RX ORDER — ACETAMINOPHEN 10 MG/ML
1000 INJECTION, SOLUTION INTRAVENOUS EVERY 6 HOURS
Status: DISPENSED | OUTPATIENT
Start: 2021-12-23 | End: 2021-12-26

## 2021-12-23 RX ORDER — BISACODYL 10 MG
10 SUPPOSITORY, RECTAL RECTAL
Status: DISCONTINUED | OUTPATIENT
Start: 2021-12-23 | End: 2021-12-26

## 2021-12-23 RX ORDER — MAGNESIUM SULFATE HEPTAHYDRATE 40 MG/ML
2 INJECTION, SOLUTION INTRAVENOUS AS NEEDED
Status: DISCONTINUED | OUTPATIENT
Start: 2021-12-23 | End: 2021-12-28

## 2021-12-23 RX ORDER — METHYLPREDNISOLONE SODIUM SUCCINATE 500 MG/1
INJECTION, POWDER, FOR SOLUTION INTRAMUSCULAR; INTRAVENOUS AS NEEDED
Status: DISCONTINUED | OUTPATIENT
Start: 2021-12-23 | End: 2021-12-23 | Stop reason: SURG

## 2021-12-23 RX ORDER — MORPHINE SULFATE 4 MG/ML
6 INJECTION, SOLUTION INTRAMUSCULAR; INTRAVENOUS EVERY 2 HOUR PRN
Status: DISCONTINUED | OUTPATIENT
Start: 2021-12-23 | End: 2021-12-24

## 2021-12-23 RX ORDER — POTASSIUM CHLORIDE 29.8 MG/ML
40 INJECTION INTRAVENOUS AS NEEDED
Status: DISCONTINUED | OUTPATIENT
Start: 2021-12-23 | End: 2021-12-28

## 2021-12-23 RX ORDER — MORPHINE SULFATE 4 MG/ML
4 INJECTION, SOLUTION INTRAMUSCULAR; INTRAVENOUS EVERY 2 HOUR PRN
Status: DISCONTINUED | OUTPATIENT
Start: 2021-12-23 | End: 2021-12-24

## 2021-12-23 RX ORDER — DEXMEDETOMIDINE HYDROCHLORIDE 4 UG/ML
INJECTION, SOLUTION INTRAVENOUS CONTINUOUS
Status: DISCONTINUED | OUTPATIENT
Start: 2021-12-23 | End: 2021-12-24

## 2021-12-23 RX ORDER — SODIUM CHLORIDE 9 MG/ML
INJECTION, SOLUTION INTRAVENOUS CONTINUOUS PRN
Status: DISCONTINUED | OUTPATIENT
Start: 2021-12-23 | End: 2021-12-23 | Stop reason: SURG

## 2021-12-23 RX ORDER — NITROGLYCERIN 20 MG/100ML
INJECTION INTRAVENOUS CONTINUOUS PRN
Status: DISCONTINUED | OUTPATIENT
Start: 2021-12-23 | End: 2021-12-23 | Stop reason: SURG

## 2021-12-23 RX ORDER — CEFAZOLIN SODIUM/WATER 2 G/20 ML
SYRINGE (ML) INTRAVENOUS AS NEEDED
Status: DISCONTINUED | OUTPATIENT
Start: 2021-12-23 | End: 2021-12-23 | Stop reason: SURG

## 2021-12-23 RX ORDER — LIDOCAINE HYDROCHLORIDE 10 MG/ML
INJECTION, SOLUTION EPIDURAL; INFILTRATION; INTRACAUDAL; PERINEURAL AS NEEDED
Status: DISCONTINUED | OUTPATIENT
Start: 2021-12-23 | End: 2021-12-23 | Stop reason: SURG

## 2021-12-23 RX ORDER — MAGNESIUM SULFATE 1 G/100ML
1 INJECTION INTRAVENOUS AS NEEDED
Status: DISCONTINUED | OUTPATIENT
Start: 2021-12-23 | End: 2021-12-28

## 2021-12-23 RX ORDER — MORPHINE SULFATE 2 MG/ML
2 INJECTION, SOLUTION INTRAMUSCULAR; INTRAVENOUS EVERY 2 HOUR PRN
Status: DISCONTINUED | OUTPATIENT
Start: 2021-12-23 | End: 2021-12-24

## 2021-12-23 RX ORDER — DOBUTAMINE HYDROCHLORIDE 200 MG/100ML
INJECTION INTRAVENOUS CONTINUOUS PRN
Status: DISCONTINUED | OUTPATIENT
Start: 2021-12-23 | End: 2021-12-28

## 2021-12-23 RX ORDER — PROTAMINE SULFATE 10 MG/ML
INJECTION, SOLUTION INTRAVENOUS AS NEEDED
Status: DISCONTINUED | OUTPATIENT
Start: 2021-12-23 | End: 2021-12-23 | Stop reason: SURG

## 2021-12-23 RX ORDER — MIDAZOLAM HYDROCHLORIDE 1 MG/ML
1 INJECTION INTRAMUSCULAR; INTRAVENOUS EVERY 30 MIN PRN
Status: DISCONTINUED | OUTPATIENT
Start: 2021-12-23 | End: 2021-12-28

## 2021-12-23 RX ORDER — NALOXONE HYDROCHLORIDE 0.4 MG/ML
0.08 INJECTION, SOLUTION INTRAMUSCULAR; INTRAVENOUS; SUBCUTANEOUS
Status: DISCONTINUED | OUTPATIENT
Start: 2021-12-23 | End: 2021-12-24

## 2021-12-23 RX ORDER — SODIUM CHLORIDE 9 MG/ML
INJECTION, SOLUTION INTRAVENOUS CONTINUOUS
Status: DISCONTINUED | OUTPATIENT
Start: 2021-12-23 | End: 2021-12-24

## 2021-12-23 RX ORDER — SENNOSIDES 8.6 MG
17.2 TABLET ORAL NIGHTLY PRN
Status: DISCONTINUED | OUTPATIENT
Start: 2021-12-23 | End: 2021-12-28

## 2021-12-23 RX ORDER — PANTOPRAZOLE SODIUM 40 MG/1
40 TABLET, DELAYED RELEASE ORAL
Status: DISCONTINUED | OUTPATIENT
Start: 2021-12-23 | End: 2021-12-28

## 2021-12-23 RX ORDER — CEFAZOLIN SODIUM/WATER 2 G/20 ML
2 SYRINGE (ML) INTRAVENOUS EVERY 8 HOURS
Status: COMPLETED | OUTPATIENT
Start: 2021-12-23 | End: 2021-12-25

## 2021-12-23 RX ORDER — NITROGLYCERIN 20 MG/100ML
INJECTION INTRAVENOUS CONTINUOUS PRN
Status: DISCONTINUED | OUTPATIENT
Start: 2021-12-23 | End: 2021-12-28

## 2021-12-23 RX ORDER — POLYETHYLENE GLYCOL 3350 17 G/17G
17 POWDER, FOR SOLUTION ORAL DAILY PRN
Status: DISCONTINUED | OUTPATIENT
Start: 2021-12-23 | End: 2021-12-28

## 2021-12-23 RX ORDER — CHLORHEXIDINE GLUCONATE 0.12 MG/ML
15 RINSE ORAL
Status: DISCONTINUED | OUTPATIENT
Start: 2021-12-23 | End: 2021-12-24

## 2021-12-23 RX ORDER — ALBUMIN, HUMAN INJ 5% 5 %
250 SOLUTION INTRAVENOUS ONCE AS NEEDED
Status: DISCONTINUED | OUTPATIENT
Start: 2021-12-23 | End: 2021-12-28

## 2021-12-23 RX ORDER — DOBUTAMINE HYDROCHLORIDE 200 MG/100ML
INJECTION INTRAVENOUS CONTINUOUS PRN
Status: DISCONTINUED | OUTPATIENT
Start: 2021-12-23 | End: 2021-12-23 | Stop reason: SURG

## 2021-12-23 RX ORDER — ALBUMIN, HUMAN INJ 5% 5 %
250 SOLUTION INTRAVENOUS ONCE
Status: COMPLETED | OUTPATIENT
Start: 2021-12-23 | End: 2021-12-23

## 2021-12-23 RX ORDER — SODIUM CHLORIDE 9 MG/ML
INJECTION, SOLUTION INTRAVENOUS CONTINUOUS
Status: DISCONTINUED | OUTPATIENT
Start: 2021-12-23 | End: 2021-12-28

## 2021-12-23 RX ORDER — HEPARIN SODIUM 1000 [USP'U]/ML
INJECTION, SOLUTION INTRAVENOUS; SUBCUTANEOUS AS NEEDED
Status: DISCONTINUED | OUTPATIENT
Start: 2021-12-23 | End: 2021-12-23 | Stop reason: SURG

## 2021-12-23 RX ORDER — MIDAZOLAM HYDROCHLORIDE 1 MG/ML
INJECTION INTRAMUSCULAR; INTRAVENOUS AS NEEDED
Status: DISCONTINUED | OUTPATIENT
Start: 2021-12-23 | End: 2021-12-23 | Stop reason: SURG

## 2021-12-23 RX ORDER — SODIUM PHOSPHATE, DIBASIC AND SODIUM PHOSPHATE, MONOBASIC 7; 19 G/133ML; G/133ML
1 ENEMA RECTAL ONCE AS NEEDED
Status: DISCONTINUED | OUTPATIENT
Start: 2021-12-23 | End: 2021-12-26

## 2021-12-23 RX ORDER — MELATONIN
3 NIGHTLY PRN
Status: DISCONTINUED | OUTPATIENT
Start: 2021-12-23 | End: 2021-12-28

## 2021-12-23 RX ORDER — POTASSIUM CHLORIDE 14.9 MG/ML
20 INJECTION INTRAVENOUS AS NEEDED
Status: DISCONTINUED | OUTPATIENT
Start: 2021-12-23 | End: 2021-12-28

## 2021-12-23 RX ORDER — DEXMEDETOMIDINE HYDROCHLORIDE 4 UG/ML
INJECTION, SOLUTION INTRAVENOUS CONTINUOUS PRN
Status: DISCONTINUED | OUTPATIENT
Start: 2021-12-23 | End: 2021-12-23 | Stop reason: SURG

## 2021-12-23 RX ADMIN — ALBUTEROL SULFATE 8 PUFF: 90 AEROSOL, METERED RESPIRATORY (INHALATION) at 11:04:00

## 2021-12-23 RX ADMIN — PROTAMINE SULFATE 10 MG: 10 INJECTION, SOLUTION INTRAVENOUS at 11:06:00

## 2021-12-23 RX ADMIN — SODIUM CHLORIDE: 9 INJECTION, SOLUTION INTRAVENOUS at 07:18:00

## 2021-12-23 RX ADMIN — ROCURONIUM BROMIDE 50 MG: 10 INJECTION, SOLUTION INTRAVENOUS at 06:51:00

## 2021-12-23 RX ADMIN — MIDAZOLAM HYDROCHLORIDE 5 MG: 1 INJECTION INTRAMUSCULAR; INTRAVENOUS at 10:29:00

## 2021-12-23 RX ADMIN — SODIUM CHLORIDE: 9 INJECTION, SOLUTION INTRAVENOUS at 11:04:00

## 2021-12-23 RX ADMIN — MIDAZOLAM HYDROCHLORIDE 3 MG: 1 INJECTION INTRAMUSCULAR; INTRAVENOUS at 06:51:00

## 2021-12-23 RX ADMIN — ROCURONIUM BROMIDE 20 MG: 10 INJECTION, SOLUTION INTRAVENOUS at 09:15:00

## 2021-12-23 RX ADMIN — METHYLPREDNISOLONE SODIUM SUCCINATE 250 MG: 500 INJECTION, POWDER, FOR SOLUTION INTRAMUSCULAR; INTRAVENOUS at 07:27:00

## 2021-12-23 RX ADMIN — HEPARIN SODIUM 5000 UNITS: 1000 INJECTION, SOLUTION INTRAVENOUS; SUBCUTANEOUS at 08:24:00

## 2021-12-23 RX ADMIN — SODIUM CHLORIDE: 9 INJECTION, SOLUTION INTRAVENOUS at 12:01:00

## 2021-12-23 RX ADMIN — CEFAZOLIN SODIUM/WATER 2 G: 2 G/20 ML SYRINGE (ML) INTRAVENOUS at 10:53:00

## 2021-12-23 RX ADMIN — MIDAZOLAM HYDROCHLORIDE 2 MG: 1 INJECTION INTRAMUSCULAR; INTRAVENOUS at 06:44:00

## 2021-12-23 RX ADMIN — SODIUM CHLORIDE: 9 INJECTION, SOLUTION INTRAVENOUS at 08:45:00

## 2021-12-23 RX ADMIN — SODIUM CHLORIDE: 9 INJECTION, SOLUTION INTRAVENOUS at 06:43:00

## 2021-12-23 RX ADMIN — ROCURONIUM BROMIDE 10 MG: 10 INJECTION, SOLUTION INTRAVENOUS at 07:19:00

## 2021-12-23 RX ADMIN — MIDAZOLAM HYDROCHLORIDE 5 MG: 1 INJECTION INTRAMUSCULAR; INTRAVENOUS at 09:15:00

## 2021-12-23 RX ADMIN — CEFAZOLIN SODIUM/WATER 2 G: 2 G/20 ML SYRINGE (ML) INTRAVENOUS at 06:59:00

## 2021-12-23 RX ADMIN — NITROGLYCERIN 5 MCG/MIN: 20 INJECTION INTRAVENOUS at 12:20:00

## 2021-12-23 RX ADMIN — DEXMEDETOMIDINE HYDROCHLORIDE 0.5 MCG/KG/HR: 4 INJECTION, SOLUTION INTRAVENOUS at 07:46:00

## 2021-12-23 RX ADMIN — PROTAMINE SULFATE 350 MG: 10 INJECTION, SOLUTION INTRAVENOUS at 11:08:00

## 2021-12-23 RX ADMIN — HEPARIN SODIUM 19000 UNITS: 1000 INJECTION, SOLUTION INTRAVENOUS; SUBCUTANEOUS at 08:59:00

## 2021-12-23 RX ADMIN — DOBUTAMINE HYDROCHLORIDE 3 MCG/KG/MIN: 200 INJECTION INTRAVENOUS at 10:57:00

## 2021-12-23 RX ADMIN — MIDAZOLAM HYDROCHLORIDE 5 MG: 1 INJECTION INTRAMUSCULAR; INTRAVENOUS at 07:46:00

## 2021-12-23 RX ADMIN — LIDOCAINE HYDROCHLORIDE 50 MG: 10 INJECTION, SOLUTION EPIDURAL; INFILTRATION; INTRACAUDAL; PERINEURAL at 06:51:00

## 2021-12-23 NOTE — PLAN OF CARE
Assumed pt care at 0730. A&Ox4, glasses. RA, denies chest pain/SOB, lung sounds clear, VSS, NSR on tele. Voids. Up ad ellyn. POC hep gtt, 1st case cabg tomorrow, POC updated with pt and family, questions answered, verbalized understanding.  Will continue to m cardiac arrhythmias or at baseline  Description: INTERVENTIONS:  - Continuous cardiac monitoring, monitor vital signs, obtain 12 lead EKG if indicated  - Evaluate effectiveness of antiarrhythmic and heart rate control medications as ordered  - Initiate letha with strengthening/mobility  - Encourage toileting schedule  Outcome: Progressing     Problem: DISCHARGE PLANNING  Goal: Discharge to home or other facility with appropriate resources  Description: INTERVENTIONS:  - Identify barriers to discharge w/pt and

## 2021-12-23 NOTE — ANESTHESIA POSTPROCEDURE EVALUATION
[address] [facility] Patient Status:  Inpatient   Age/Gender 57year old female MRN [id number]   [facility] 6NE-A Attending Jessica Bowden MD   Hosp Day # 2 PCP Mejia Jiménez MD       Anesthesia Post-op Note    CORONARY ARTERY B

## 2021-12-23 NOTE — ANESTHESIA PROCEDURE NOTES
Arterial Line  Performed by: Matt Loo MD  Authorized by: Matt Loo MD     General Information and Staff    Procedure Start:  12/23/2021 6:45 AM  Procedure End:  12/23/2021 6:47 AM  Anesthesiologist:  Matt Loo MD  Performed By:  Abdelrahman Patel

## 2021-12-23 NOTE — ANESTHESIA PROCEDURE NOTES
Procedure Performed: CIPRIANO    Start Time:        End Time:      Preanesthesia Checklist:  Patient identified, IV assessed, risks and benefits discussed, monitors and equipment assessed, procedure being performed at surgeon's request and anesthesia consent ob normal.  Dissection not present. Plaque thickness less than 3 mm. Mobile plaque not present. Descending Aorta:  Size normal.  Dissection not present. Plaque thickness less than 3 mm. Mobile plaque not present.           Atria    Right Atrium:  Size n

## 2021-12-23 NOTE — CONSULTS
Pharmacy consulted to dose post-op antibiotics. SCr 0.64 mg/dL; estimated CrCl 78.7 mL/min. Ok for Ancef 2 grams IV every 8 hours x5 doses as ordered. Vancomycin adjusted to 1 gram IV every 12 hours x2 doses.     Hilda Sanches, PharmD  12/23/21 2:02 PM

## 2021-12-23 NOTE — ANESTHESIA PROCEDURE NOTES
Central Line  Performed by: Vick Willson MD  Authorized by: Vick Willson MD     General Information and Staff    Procedure Start:  12/23/2021 6:55 AM  Anesthesiologist:  Vick Willson MD  Performed by:   Anesthesiologist  Patient Location:  OR  I

## 2021-12-23 NOTE — DIETARY NOTE
Clinical Nutrition     Dietitian consult received per cardiac rehab standing order. Pt to be educated by cardiac rehab staff and encouraged to attend outpatient classes taught by SHELLY. SHELLY available PRN.     Yazan Armas RD, LDN  Clinical Dietitian  Phone [phone]

## 2021-12-23 NOTE — PROGRESS NOTES
[facility]     Hospitalist Progress Note     Herorakan Lou Patient Status:  Inpatient    1960 MRN [id number]   [facility] 8NE-A Attending Heath Bynum MD   Hosp Day # 2 PCP Suhail Ortega MD     Chief Complaint: Medical manag Imaging:   XR CHEST AP PORTABLE  (CPT=71045)       TECHNIQUE:  AP chest radiograph was obtained.       COMPARISON:  None.       INDICATIONS:  pre-op cabg       PATIENT STATED HISTORY: (As transcribed by Technologist)              FINDINGS:  Cardiac s TCC on discharge?:  Follow-up with regular outpatient primary care physician  Estimated date of discharge: To be decided  Discharge is dependent on: Clinical progress  At this point Ms. Shasha Guerra is expected to be discharge to: Home

## 2021-12-23 NOTE — ANESTHESIA PROCEDURE NOTES
Airway  Date/Time: 12/23/2021 6:53 AM  Urgency: elective    Airway not difficult    General Information and Staff    Patient location during procedure: OR  Anesthesiologist: Latia Briggs MD  Performed: anesthesiologist     Indications and Patient Condi

## 2021-12-23 NOTE — PAYOR COMM NOTE
--------------  CONTINUED STAY REVIEW    Payor: [address] [facility]  Subscriber #:  [id number]  Authorization Number: [id number]    Admit date: 12/21/21  Admit time:  6:12 PM    FAXING CLINICAL UPDATE FOR 12/22/21 12/22/21  Subjective:   Patient stat NovoLog carb counting and correction factor- we will only give half dose of Levemir while n.p.o. for planned CABG tomorrow  2. Follow Accu-Cheks  3. Hemoglobin A1c done on 12/21/2021 was 9.8  4.  We will plan to do insulin drip protocol post CABG, this was

## 2021-12-23 NOTE — OPERATIVE REPORT
Operative Note    Patient Name: Chaz Chao    Preoperative Diagnosis: coronary artery disease    Postoperative Diagnosis: same    Primary Surgeon: Yuriy Pemberton MD    Assistant: Eugene Sanon PA-C    Procedures: CABG x 4; LIMA to LAD, SVG to RPDA, SVG to

## 2021-12-23 NOTE — PLAN OF CARE
Assumed care of patient at [address]. Patient alert and oriented x4. NSR on telemetry. On room air. CHG shower performed. Patient NPO since midnight. Achieved 1250 on IS. Heparin gtt infusing per orders. IV fluids infusing per orders. SCDs in place.  Dex hose pl and temperature  - Assess for signs of decreased coronary artery perfusion - ex.  Angina  - Evaluate fluid balance, assess for edema, trend weights  Outcome: Progressing  Goal: Absence of cardiac arrhythmias or at baseline  Description: INTERVENTIONS:  - Co pt to call for assistance with activity based on assessment  - Modify environment to reduce risk of injury  - Provide assistive devices as appropriate  - Consider OT/PT consult to assist with strengthening/mobility  - Encourage toileting schedule  Outcome:

## 2021-12-23 NOTE — PROGRESS NOTES
[facility] Cardiology  Progress Note    Juan Antonio Lopez Patient Status:  Inpatient    1960 MRN [id number]   [facility] 6NE-A Attending Alex Martinez MD   Hosp Day # 2 PCP Melina Negrete MD     Subjective:  Early POD #0 CABG.   Operative find Plan:  Principal Problem:    Status post coronary artery bypass graft  Active Problems:    Benign essential HTN    Mixed hyperlipidemia    Type 2 diabetes mellitus without complication, with long-term current use of insulin (HCC)    CAD (coronary artery di

## 2021-12-24 ENCOUNTER — APPOINTMENT (OUTPATIENT)
Dept: GENERAL RADIOLOGY | Facility: HOSPITAL | Age: 61
DRG: 236 | End: 2021-12-24
Attending: THORACIC SURGERY (CARDIOTHORACIC VASCULAR SURGERY)
Payer: COMMERCIAL

## 2021-12-24 ENCOUNTER — APPOINTMENT (OUTPATIENT)
Dept: GENERAL RADIOLOGY | Facility: HOSPITAL | Age: 61
DRG: 236 | End: 2021-12-24
Attending: PHYSICIAN ASSISTANT
Payer: COMMERCIAL

## 2021-12-24 PROCEDURE — 71045 X-RAY EXAM CHEST 1 VIEW: CPT | Performed by: THORACIC SURGERY (CARDIOTHORACIC VASCULAR SURGERY)

## 2021-12-24 PROCEDURE — 71045 X-RAY EXAM CHEST 1 VIEW: CPT | Performed by: PHYSICIAN ASSISTANT

## 2021-12-24 PROCEDURE — 99233 SBSQ HOSP IP/OBS HIGH 50: CPT | Performed by: INTERNAL MEDICINE

## 2021-12-24 RX ORDER — METOCLOPRAMIDE 10 MG/1
10 TABLET ORAL EVERY 6 HOURS PRN
Status: DISCONTINUED | OUTPATIENT
Start: 2021-12-24 | End: 2021-12-28

## 2021-12-24 RX ORDER — SCOLOPAMINE TRANSDERMAL SYSTEM 1 MG/1
1 PATCH, EXTENDED RELEASE TRANSDERMAL
Status: DISCONTINUED | OUTPATIENT
Start: 2021-12-24 | End: 2021-12-28

## 2021-12-24 RX ORDER — KETOROLAC TROMETHAMINE 30 MG/ML
30 INJECTION, SOLUTION INTRAMUSCULAR; INTRAVENOUS EVERY 6 HOURS
Status: DISPENSED | OUTPATIENT
Start: 2021-12-24 | End: 2021-12-26

## 2021-12-24 RX ORDER — METOCLOPRAMIDE HYDROCHLORIDE 5 MG/ML
10 INJECTION INTRAMUSCULAR; INTRAVENOUS EVERY 6 HOURS PRN
Status: DISCONTINUED | OUTPATIENT
Start: 2021-12-24 | End: 2021-12-28

## 2021-12-24 RX ORDER — KETOROLAC TROMETHAMINE 30 MG/ML
30 INJECTION, SOLUTION INTRAMUSCULAR; INTRAVENOUS EVERY 6 HOURS
Status: DISCONTINUED | OUTPATIENT
Start: 2021-12-24 | End: 2021-12-24

## 2021-12-24 NOTE — CM/SW NOTE
Met with patient to discuss dc planning. She is agreeable to [facility].

## 2021-12-24 NOTE — PHYSICAL THERAPY NOTE
PHYSICAL THERAPY EVALUATION - INPATIENT     Room Number: 7907/6471-I  Evaluation Date: 12/24/2021  Type of Evaluation: Initial  Physician Order: PT Eval and Treat    Presenting Problem: S/p CABG x4 12/23  Co-Morbidities : NSTEMI, HL, L RTC repair, DM demonstrate supine - sit EOB @ level: modified independent     Goal #2 Patient is able to demonstrate transfers EOB to/from [facility] at assistance level: modified independent     Goal #3 Patient is able to ambulate 300 feet with assist device: none at assistanc '6-Clicks' INPATIENT SHORT FORM - BASIC MOBILITY  How much difficulty does the patient currently have. ..   Patient Difficulty: Turning over in bed (including adjusting bedclothes, sheets and blankets)?: None   Patient Difficulty: Sitting down on and standin End of Session: Up in chair;Needs met;Call light within reach;RN aware of session/findings; All patient questions and concerns addressed; Family present (Rn Vivien aware of eval session)    Therapist PPE: Mask, gloves and goggles were worn.   Patient/Family PP

## 2021-12-24 NOTE — PROGRESS NOTES
[facility]  Progress Note    Abilio Mendenhall Patient Status:  Inpatient    1960 MRN [id number]   [facility] 6NE-A Attending Meg Stockton MD   Hosp Day # 3 PCP Lorenzo Mart MD     Subjective: Extubated last night. Normal affect.      [place]-Jose catheter, A-line and chest tubes in situ    Laboratory/Data:    Labs:       Recent Labs   Lab 12/21/21  1914 12/21/21  1914 12/22/21  0658 12/23/21  0437 12/23/21  1211 12/23/21  1246 12/24/21  0409   WBC 9.8  --  8.2  --   -- by (CST): Kvng Hargrove MD on 12/24/2021 at 7:14 AM     Finalized by (CST): Kvng Hargrove MD on 12/24/2021 at 7:15 AM       XR CHEST AP PORTABLE  (CPT=71045)    Result Date: 12/23/2021  PROCEDURE:  XR CHEST AP PORTABLE  (CPT=71045)  TECHNIQUE:  A MD Jacqui on 12/22/2021 at 5:23 PM         Medications:  scopolamine (TRANSDERM-SCOP) patch, 1 patch, Transdermal, Q72H  ketorolac (TORADOL) 30 MG/ML injection 30 mg, 30 mg, Intravenous, Q6H  methylPREDNISolone Sodium Succ (Solu-MEDROL) 1,000 mg in sodium c Sodium (PROTONIX) 40 mg in sodium chloride (PF) 0.9 % 10 mL IV push, 40 mg, Intravenous, QAM AC   Or  pantoprazole (PROTONIX) EC tab 40 mg, 40 mg, Oral, QAM AC  ceFAZolin (ANCEF/KEFZOL) 2 GM/20ML premix IV syringe 2 g, 2 g, Intravenous, Q8H  Insulin Regula 0.46  Potassium 4.4  Sodium 145  Glucose 112  Hemoglobin 11.5  Platelet count 090    Chest x-ray with no congestion or pleural effusion or pneumothorax.     EKG 74 bpm with nonspecific ST elevation across the EKG which could be consistent with pericarditis

## 2021-12-24 NOTE — OCCUPATIONAL THERAPY NOTE
OCCUPATIONAL THERAPY EVALUATION - INPATIENT     Room Number: 5464/5961-M  Evaluation Date: 12/24/2021  Type of Evaluation: Initial  Presenting Problem: s/p CABG    Physician Order: IP Consult to Occupational Therapy  Reason for Therapy: ADL/IADL Dysfunctio Sit : Minimum assistance  Sit to Stand: Minimum assistance  Sit to supine: min A  Chair transfer: min A  Toilet/commode transfer: min A  Ambulation CGA and use of walker    Activity tolerance: tolerated > 4 min in standing with c/o nausea and occ dizziness full time as resource ICU RN for [employer]. Patient drives and is active. Patient reports she will have her daughter checking in on her daily.  works during the day. SUBJECTIVE   Patient reports she worked at [employer] in [employer].     PAIN ASSESSMENT  Ratin precautions during morning routine with no cues. Pt will stand at the sink x 5 minutes during self-care with 1 rest break modified independent.   Pt will independently verbalize 3 energy conservation techniques that can be incorporated into ADLs with no c

## 2021-12-24 NOTE — PROGRESS NOTES
[facility]   [facility] Progress Note    Abilio Mendenhall Patient Status:  Inpatient    1960 MRN [id number]   [facility] 6NE-A Attending Meg Stockton MD   Hosp Day # 3 PCP Lorenzo Mart MD     Subjective:  Sitting up in chair, first infusion 50mg in D5W 250ml, 5-300 mcg/min, Intravenous, Continuous PRN  norepinephrine (LEVOPHED) 4 mg/250 ml premix infusion, 0.5-30 mcg/min, Intravenous, Continuous PRN  Nitroprusside Sodium (NIPRIDE) 50 mg in dextrose 5 % 250 mL infusion, 0.1-4 mcg/kg/m tab 8 tablet, 8 tablet, Oral, Q15 Min PRN  Midazolam HCl (VERSED) 2 MG/2ML injection 1 mg, 1 mg, Intravenous, Q30 Min PRN  propofol (DIPRIVAN) infusion, 5-100 mcg/kg/min (Dosing Weight), Intravenous, Continuous  Dexmedetomidine HCl in NaCl (PRECEDEX) 400 M acute pancreatitis, unspecified complication status     Type 2 diabetes mellitus without complication, with long-term current use of insulin (HCC)     History of pancreatitis     Carpal tunnel syndrome of left wrist     Rotator cuff impingement syndrome of

## 2021-12-24 NOTE — PROGRESS NOTES
[facility]     Hospitalist Progress Note     Gloria Monterroso Patient Status:  Inpatient    1960 MRN [id number]   [facility] 8NE-A Attending Karla Burris MD   Hosp Day # 3 PCP Monica Velasco MD     Chief Complaint: Medical manag --    AST 15  --   --    ALT 22  --   --    BILT 0.2  --   --    TP 6.3*  --   --        Imaging:   XR CHEST AP PORTABLE  (CPT=71045)       TECHNIQUE:  AP chest radiograph was obtained.       COMPARISON:  None.       INDICATIONS:  pre-op cabg       PATIEN discharge?:  Follow-up with regular outpatient primary care physician  Estimated date of discharge: To be decided  Discharge is dependent on: Clinical progress  At this point Ms. Kailey Barahona is expected to be discharge to: Home

## 2021-12-24 NOTE — PLAN OF CARE
Assumed patient care this morning around 0730 am. Patient is alert and oriented. Patient is on 4L nasal cannula, attempting to wean down as able. Joel/CT/Justin/Mana/José DC, no complications. Patients vital signs stable.  1 gm solumedrol given per Dr. Marion Gates

## 2021-12-24 NOTE — PLAN OF CARE
Pt received vented, on no sedation. Pt wakes easily, follows commands and nods head appropriately to questions asked. Pt placed on CPAP per RT for vent weaning. Pt anxious and tachypneic. Precedex started at 0.2 mcg. Pt's daughter at bedside.  Pt tolerated

## 2021-12-24 NOTE — PLAN OF CARE
Assumed care of pt from CVOR, vented, sedated, with swan, cordis, chest tubes x2, everett catheter, left radial art line. Drips; dobutamin, insulin, nitroglycerin, propofol, precedex, D5.45, 0.9NS. see flow sheet for detailed assessment.  Family at bedside, P

## 2021-12-24 NOTE — PAYOR COMM NOTE
--------------  CONTINUED STAY REVIEW    Payor: [address]  Subscriber #:  [id number]  Authorization Number: [id number]    Admit date: 12/21/21  Admit time:  6:12 PM    FAXING CLINICAL UPDATE FOR 12/23/21 12/23/21  Subjective:   Status post Intravenous       Albumin Human (ALBUMINAR) 5 % solution 250 mL     Date Action Dose Route     12/23/2021 1500 New Bag 250 mL Intravenous       Albumin Human (ALBUMINAR) 5 % solution 250 mL     Date Action Dose Route     12/23/2021 1711 New Bag 250 mL Intr Rate/Dose Verify 2 Units/hr Intravenous     12/24/2021 0600 Rate/Dose Change 2 Units/hr Intravenous     12/24/2021 0500 Rate/Dose Verify 1 Units/hr Intravenous     12/24/2021 0400 Restarted 1 Units/hr Intravenous     12/24/2021 0100 Rate/Dose Change 6 Unit Route     12/23/2021 1259 New Bag 25 mcg/kg/min × 79.8 kg (Dosing Weight) Intravenous       0.9% NaCl infusion     Date Action Dose Route     12/23/2021 1256 New Bag 10 mL/hr Intravenous       0.9% NaCl infusion     Date Action Dose Route     12/23/2021 12

## 2021-12-25 PROCEDURE — 99233 SBSQ HOSP IP/OBS HIGH 50: CPT | Performed by: INTERNAL MEDICINE

## 2021-12-25 RX ORDER — CLOPIDOGREL BISULFATE 75 MG/1
75 TABLET ORAL DAILY
Status: DISCONTINUED | OUTPATIENT
Start: 2021-12-25 | End: 2021-12-28

## 2021-12-25 RX ORDER — FUROSEMIDE 10 MG/ML
40 INJECTION INTRAMUSCULAR; INTRAVENOUS DAILY
Status: DISCONTINUED | OUTPATIENT
Start: 2021-12-25 | End: 2021-12-28

## 2021-12-25 NOTE — PROGRESS NOTES
[facility]   [facility] Progress Note    Shankar Carter Patient Status:  Inpatient    1960 MRN [id number]   [facility] 6NE-A Attending Mary Grace Najera MD   Hosp Day # 4 PCP Montserrat Hector MD     Subjective:  Feeling better than yester PRN  DOBUTamine in D5W (DOBUTREX) 500 mg/250 ml infusion, 2.5-20 mcg/kg/min (Dosing Weight), Intravenous, Continuous PRN  nitroGLYCERIN infusion 50mg in D5W 250ml, 5-300 mcg/min, Intravenous, Continuous PRN  norepinephrine (LEVOPHED) 4 mg/250 ml premix inf 12/25/2021     12/25/2021    CO2 20.0 12/25/2021     12/25/2021    CA 8.1 12/25/2021       Chest Xray:   INDICATIONS:  S/P chest tube pull       PATIENT STATED HISTORY: (As transcribed by Technologist)  Post chest tube removal.            FIND [facility] mellitus ([facility]     CAD (coronary artery disease)     NSTEMI (non-ST elevated myocardial infarction) ([facility]     Dyslipidemia     Status post coronary artery bypass graft      POD# 2 S/P CABG     - HD stable  - PPS sol u medrol 12/ 24 1 gm   - Post o

## 2021-12-25 NOTE — PROGRESS NOTES
[facility]  Progress Note    Greg Villatoro Patient Status:  Inpatient    1960 MRN [id number]   [facility] 6NE-A Attending Karen Ramirez MD   Hosp Day # 4 PCP Hawa Lopez MD     Subjective: Post-op pain improved 12.2  --   --  12.8 11.5*   MCV 87.5  --  86.9  --   --  85.2 86.8   .0   < > 186.0 182.0 100.0* 110.0* 110.0*   INR  --   --  0.96  --  1.33* 1.28*  --     < > = values in this interval not displayed.        Recent Labs   Lab 12/22/21  7746 12/22/21 (DOBUTREX) 500 mg/250 ml infusion, 2.5-20 mcg/kg/min (Dosing Weight), Intravenous, Continuous PRN  nitroGLYCERIN infusion 50mg in D5W 250ml, 5-300 mcg/min, Intravenous, Continuous PRN  norepinephrine (LEVOPHED) 4 mg/250 ml premix infusion, 0.5-30 mcg/min, hemodynamically stable   2. MV CAD with LMCA disease - presented with acute coronary syndrome initially to [facility] and then transferred to [address] [place] [address] for CABG. Jaw pain on admission. LV function preserved with no significant valvular pathology.   3. P

## 2021-12-25 NOTE — PROGRESS NOTES
[facility]     Hospitalist Progress Note     Maritza Nielsen Patient Status:  Inpatient    1960 MRN [id number]   [facility] 8NE-A Attending Jos Colbert MD   Hosp Day # 4 PCP Jadiel Smith MD     Chief Complaint: Medical manag --   --   --   --       < > 146* 145 138   K 4.5   < > 3.7 4.4 5.1      < > 120* 117* 110   CO2 25.0   < > 21.0 23.0 20.0*   ALKPHO 77  --   --   --   --    AST 15  --   --   --   --    ALT 22  --   --   --   --    BILT 0.2  --   --   --   -- with patient, RN    Ruth Ludwig MD      Supplementary Documentation:     Quality:  · DVT Prophylaxis: SCD  · CODE status: full  · Kumar: no  · Central line: no  · If COVID testing is negative, may discontinue isolation: yes     Will the patient be ref

## 2021-12-25 NOTE — PLAN OF CARE
Assumed patient care at 0730. Patient resting in bed, alert/oriented. Room air. Monitor shows NSR. Pacing wires taped. Pulses palpable. Lasix and plavix started per Dr. Neri Ham. Zofran given for nausea. Up to bathroom to void with standby assist and walker.  Pl

## 2021-12-26 PROCEDURE — 99232 SBSQ HOSP IP/OBS MODERATE 35: CPT | Performed by: INTERNAL MEDICINE

## 2021-12-26 RX ORDER — ACETAMINOPHEN 500 MG
500 TABLET ORAL EVERY 6 HOURS PRN
Status: DISCONTINUED | OUTPATIENT
Start: 2021-12-26 | End: 2021-12-28

## 2021-12-26 RX ORDER — ACETAMINOPHEN 500 MG
1000 TABLET ORAL EVERY 6 HOURS PRN
Status: DISCONTINUED | OUTPATIENT
Start: 2021-12-26 | End: 2021-12-28

## 2021-12-26 RX ORDER — SODIUM PHOSPHATE, DIBASIC AND SODIUM PHOSPHATE, MONOBASIC 7; 19 G/133ML; G/133ML
1 ENEMA RECTAL ONCE AS NEEDED
Status: DISCONTINUED | OUTPATIENT
Start: 2021-12-26 | End: 2021-12-28

## 2021-12-26 RX ORDER — BISACODYL 10 MG
10 SUPPOSITORY, RECTAL RECTAL
Status: DISCONTINUED | OUTPATIENT
Start: 2021-12-26 | End: 2021-12-28

## 2021-12-26 RX ORDER — DOCUSATE SODIUM 100 MG/1
100 CAPSULE, LIQUID FILLED ORAL 2 TIMES DAILY
Status: DISCONTINUED | OUTPATIENT
Start: 2021-12-26 | End: 2021-12-28

## 2021-12-26 NOTE — PROGRESS NOTES
[facility]  Progress Note    Donna Wilson Patient Status:  Inpatient    1960 MRN [id number]   [facility] 6NE-A Attending Eliud Nunez MD   Hosp Day # 5 PCP Tan Keith MD     Subjective: No chest pain or dysp 86.9  --   --   --  85.2 86.8 88.8   .0   < > 186.0   < > 182.0 100.0* 110.0* 110.0* 120.0*   INR  --   --  0.96  --   --  1.33* 1.28*  --   --     < > = values in this interval not displayed.        Recent Labs   Lab 12/22/21  1657 12/23/21  1246 12 Units, Subcutaneous, TID AC and HS  0.9% NaCl infusion, , Intravenous, Continuous  melatonin tab 3 mg, 3 mg, Oral, Nightly PRN  polyethylene glycol (PEG 3350) (MIRALAX) powder packet 17 g, 17 g, Oral, Daily PRN  sennosides (SENOKOT) tab 17.2 mg, 17.2 mg, O Intravenous, Q30 Min PRN  ipratropium-albuterol (DUONEB) nebulizer solution 3 mL, 3 mL, Nebulization, Q4H PRN  aspirin EC tab 81 mg, 81 mg, Oral, Daily  atorvastatin (LIPITOR) tab 40 mg, 40 mg, Oral, Nightly        Allergies:    Seasonal                Run

## 2021-12-26 NOTE — PROGRESS NOTES
[facility]   [facility] Progress Note    Paolo Hatch Patient Status:  Inpatient    1960 MRN [id number]   [facility] 6NE-A Attending Sandra Barth MD   Hosp Day # 5 PCP Kush Holder MD     Subjective:  Feeling constipated, pain 2.5-20 mcg/kg/min (Dosing Weight), Intravenous, Continuous PRN  nitroGLYCERIN infusion 50mg in D5W 250ml, 5-300 mcg/min, Intravenous, Continuous PRN  norepinephrine (LEVOPHED) 4 mg/250 ml premix infusion, 0.5-30 mcg/min, Intravenous, Continuous PRN  Nitrop 24.0 12/26/2021     12/26/2021    CA 8.6 12/26/2021         Physical Exam:    Neuro:alert and oriented. Intact   Lungs: CTA, diminished.    Heart: s1s2 RRR, NSR sternum stable  Abdomen: soft, nontender +BS -BM  Extremities:+pulses, trace edema

## 2021-12-26 NOTE — PLAN OF CARE
Pt received from CCU via wheelchair. A&Ox4. Daughter at bedside. On room air saturating >90%. Sinus rhythm on the monitor. IV lasix per order.  PW in, taped to chest. Midsternal and left leg incisions with steristrips, treated with betadine, clean dry intac hematoma  - Assess quality of pulses, skin color and temperature  - Assess for signs of decreased coronary artery perfusion - ex.  Angina  - Evaluate fluid balance, assess for edema, trend weights  Outcome: Progressing  Goal: Absence of cardiac arrhythmias

## 2021-12-26 NOTE — PROGRESS NOTES
[facility]     Hospitalist Progress Note     Greg Villatoro Patient Status:  Inpatient    1960 MRN [id number]   [facility] 8NE-A Attending Karen Ramirez MD   Hosp Day # 5 PCP Hawa Lopez MD     Chief Complaint: Medical manag 64   CA 8.5   < > 8.0* 8.1* 8.6   ALB 2.7*  --   --   --   --       < > 145 138 145   K 4.5   < > 4.4 5.1 4.2      < > 117* 110 115*   CO2 25.0   < > 23.0 20.0* 24.0   ALKPHO 77  --   --   --   --    AST 15  --   --   --   --    ALT 22  --   -- blood pressure  3. UTI  1. Continue IV abx  4.  Hyperlipidemia  1. statin per cardiology        Plan of care: continue post op care, encourage ambulation    Plan of care discussed with patient, RN    Alice Lara MD      Supplementary Documentation:

## 2021-12-26 NOTE — PHYSICAL THERAPY NOTE
PHYSICAL THERAPY TREATMENT NOTE - INPATIENT    Room Number: 5543/3548-B     Session: 1     Number of Visits to Meet Established Goals: 3     History related to current admission: Patient is a 57year old female admitted on 12/21/2021 from [facility] mechanics;Breathing techniques;Relaxation;Repositioning    BALANCE                                                                                                                       Static Sitting: Good  Dynamic Sitting: Good           Static Standing: sister in room as well. Pt performed above functional transfers during session. Pt educated on proper log rolling and supine to sit to supine transfers with HOB down since pt's bed at home is a regular bed.  Pt demonstrated good return and verbalized unders

## 2021-12-26 NOTE — PLAN OF CARE
Vitals stable. Pt. Denies pain, medicated with scheduled IV toradol. Ambulated around unit with mild SOB on room air. Hospitalist notified of elevated glucose reading, evening levemir dose increased.

## 2021-12-27 PROCEDURE — 99232 SBSQ HOSP IP/OBS MODERATE 35: CPT | Performed by: INTERNAL MEDICINE

## 2021-12-27 RX ORDER — LISINOPRIL 10 MG/1
10 TABLET ORAL DAILY
Status: DISCONTINUED | OUTPATIENT
Start: 2021-12-27 | End: 2021-12-27

## 2021-12-27 RX ORDER — TRAMADOL HYDROCHLORIDE 50 MG/1
50 TABLET ORAL EVERY 6 HOURS PRN
Status: DISCONTINUED | OUTPATIENT
Start: 2021-12-27 | End: 2021-12-28

## 2021-12-27 RX ORDER — MORPHINE SULFATE 2 MG/ML
2 INJECTION, SOLUTION INTRAMUSCULAR; INTRAVENOUS ONCE
Status: COMPLETED | OUTPATIENT
Start: 2021-12-27 | End: 2021-12-27

## 2021-12-27 RX ORDER — MORPHINE SULFATE 2 MG/ML
2 INJECTION, SOLUTION INTRAMUSCULAR; INTRAVENOUS EVERY 4 HOURS PRN
Status: DISCONTINUED | OUTPATIENT
Start: 2021-12-27 | End: 2021-12-28

## 2021-12-27 RX ORDER — LOSARTAN POTASSIUM 25 MG/1
25 TABLET ORAL DAILY
Status: DISCONTINUED | OUTPATIENT
Start: 2021-12-27 | End: 2021-12-28

## 2021-12-27 NOTE — PLAN OF CARE
Assumed care at [phone]. Pt is alert and oriented. Now post-op day 4 CABG. RA. VSS. Pt c/o 8/10 incision pain in the night that was not being affected by the tylenol administered by the previous nurse. Paged oncall hospitalist for one time dose of morphine.  Darcie Canales decreased coronary artery perfusion - ex.  Angina  - Evaluate fluid balance, assess for edema, trend weights  Outcome: Progressing  Goal: Absence of cardiac arrhythmias or at baseline  Description: INTERVENTIONS:  - Continuous cardiac monitoring, monitor vi based on assessment  - Modify environment to reduce risk of injury  - Provide assistive devices as appropriate  - Consider OT/PT consult to assist with strengthening/mobility  - Encourage toileting schedule  Outcome: Progressing     Problem: DISCHARGE PLAN

## 2021-12-27 NOTE — PAYOR COMM NOTE
--------------  CONTINUED STAY REVIEW    Payor: [address]  Subscriber #:  [id number]  Authorization Number: [id number]    Admit date: 12/21/21  Admit time:  6:12 PM    FAXING CLINICAL UPDATE FOR 12/24/21 12/24/21   Subjective:    Patient ex angina.     Tele: Stable sinus rhythm.     [place]-Jose catheter, A-line and chest tubes in situ   Impression:  1. S/p 4-vessel CABG (LIMA to LAD, SVG to RPDA, OM, and RI) - day#1 - hemodynamically stable and extubated last night.   2. MV CAD with LMCA disease tab 1,000 mg     Date Action Dose Route     12/26/2021 2339 Given 1,000 mg Oral     12/26/2021 1551 Given 1,000 mg Oral       aspirin EC tab 81 mg     Date Action Dose Route     12/27/2021 0805 Given 81 mg Oral       atorvastatin (LIPITOR) tab 40 mg     [facility]) injection 4 mg     Date Action Dose Route     12/27/2021 1120 Given 4 mg Intravenous     12/26/2021 2339 Given 4 mg Intravenous     12/26/2021 1552 Given 4 mg Intravenous       pantoprazole (PROTONIX) EC tab 40 mg     Date Action Dose Route     12

## 2021-12-27 NOTE — PROGRESS NOTES
[address] [facility]  Cardiology Progress Note    [facility] Patient Status:  Inpatient    1960 MRN [id number]   [facility] 8NE-A Attending Chaparrita Morrison MD   Hosp Day # 6 PCP Jassi Allen MD       Subjective:  Feels well overall no wheezing  Abdomen: Soft, non-tender. Extremities: No clubbing or cyanosis. Radial pulses 2+ bilaterally. No edema. Neurologic: A&Ox3. No gross motor deficits. Skin: Warm and dry.        Assessment:    · MV-CAD s/p CABG (LIMA-LAD, SVG-RPDA, SVG-OM, SV

## 2021-12-27 NOTE — PROGRESS NOTES
Progress Note  Meg Arango Patient Status:  Inpatient    1960 MRN [id number]   [facility] 8NE-A Attending Lefty Tang MD   Hosp Day # 6 PCP Maria Antonia Yepez MD     Subjective:  Feeling sluggish.  Only able to pull 250 on IS. sat docusate sodium  100 mg Oral BID   • furosemide  40 mg Intravenous Daily   • clopidogrel  75 mg Oral Daily   • insulin detemir  65 Units Subcutaneous Nightly   • scopolamine  1 patch Transdermal Q72H   • Insulin Aspart Pen  1-10 Units Subcutaneous TID ADARSH pelaez

## 2021-12-27 NOTE — DIETARY NOTE
Marlene [address]     Admitting diagnosis:  CABG  CAD (coronary artery disease)    Ht: 162.6 cm (5' 4\")  Wt: 84.2 kg (185 lb 11.2 oz). Body mass index is 31.88 kg/m².   Ideal body weight: 54.7 kg (120 lb 9.5 oz)  Adj further questions at this time. Patient reports good appetite at this time. Nursing notes reports Percent Meals Eaten (%): 100 % intake for last meal.  Tolerating po diet without diarrhea, emesis, or constipation.    No significant weight changes note

## 2021-12-27 NOTE — OPERATIVE REPORT
[address]    PATIENT'S NAME: Robert Nails   ATTENDING PHYSICIAN: Evangelina Chang MD   OPERATING PHYSICIAN: Evangelina Chang MD   PATIENT ACCOUNT#:   [id number]    LOCATION:  [address] [place] [address]  MEDICAL RECORD #:   [id number]       YOB: 1960  A and diffusely diseased, if anything, significantly more diseased than predicted on the angiogram.  The obtuse marginal was addressed first.  This was buried somewhat beneath the lateral ventricular muscle and had to be dissected out.   Where I opened it, it

## 2021-12-27 NOTE — CARDIAC REHAB
Cardiac rehab education completed with pt and spouse. Pt will be considering attending [facility] re[facility] due to location. Contact information provided.

## 2021-12-27 NOTE — PLAN OF CARE
Patient refused lisinopril due to a previous history of coughing with an Ace. Will change to Losartan.    11:40 AM

## 2021-12-27 NOTE — PROGRESS NOTES
[facility]     Hospitalist Progress Note     Paolo Hatch Patient Status:  Inpatient    1960 MRN [id number]   [facility] 8NE-A Attending Sandra Barth MD   Hosp Day # 6 PCP Kush Holder MD     Chief Complaint: Medical manag 8. 0* 8.1* 8.6   ALB 2.7*  --   --   --   --       < > 145 138 145   K 4.5   < > 4.4 5.1 4.2      < > 117* 110 115*   CO2 25.0   < > 23.0 20.0* 24.0   ALKPHO 77  --   --   --   --    AST 15  --   --   --   --    ALT 22  --   --   --   --    BILT pressure  3. UTI  1. Continue IV abx  4.  Hyperlipidemia  1. statin per cardiology        Plan of care: continue post op care, encourage ambulation, DC planning in next day or so    Plan of care discussed with patient, RN    Miguel Ayon MD      Supplem

## 2021-12-27 NOTE — PLAN OF CARE
Assumed care at 0730. Pt is A&Ox4. Pt is on RA with , sats maintaining >90%, lungs diminished. NSR on tele, VSS. Pacer wires in place. No complaints of cardiac symptoms. Continent of B&B. C/o pain in sternal incision relived w. tramadol.  Up SBA, tolerat temperature  - Assess for signs of decreased coronary artery perfusion - ex.  Angina  - Evaluate fluid balance, assess for edema, trend weights  Outcome: Progressing  Goal: Absence of cardiac arrhythmias or at baseline  Description: INTERVENTIONS:  - Contin to call for assistance with activity based on assessment  - Modify environment to reduce risk of injury  - Provide assistive devices as appropriate  - Consider OT/PT consult to assist with strengthening/mobility  - Encourage toileting schedule  Outcome: Pr

## 2021-12-27 NOTE — PROGRESS NOTES
[facility]     CV Surgery Progress Note    Wandy [place] Patient Status:  Inpatient    1960 MRN [id number]   [facility] 8NE-A Attending Ulysses Dorsey MD   Hosp Day # 6 PCP Malka Hernandez MD     Subjective:  Patient reports fee

## 2021-12-28 ENCOUNTER — APPOINTMENT (OUTPATIENT)
Dept: CV DIAGNOSTICS | Facility: HOSPITAL | Age: 61
DRG: 236 | End: 2021-12-28
Attending: PHYSICIAN ASSISTANT
Payer: COMMERCIAL

## 2021-12-28 VITALS
DIASTOLIC BLOOD PRESSURE: 52 MMHG | SYSTOLIC BLOOD PRESSURE: 114 MMHG | HEART RATE: 67 BPM | HEIGHT: 64 IN | BODY MASS INDEX: 30.9 KG/M2 | RESPIRATION RATE: 16 BRPM | WEIGHT: 181 LBS | TEMPERATURE: 98 F | OXYGEN SATURATION: 94 %

## 2021-12-28 PROCEDURE — 93306 TTE W/DOPPLER COMPLETE: CPT | Performed by: PHYSICIAN ASSISTANT

## 2021-12-28 PROCEDURE — 99232 SBSQ HOSP IP/OBS MODERATE 35: CPT | Performed by: INTERNAL MEDICINE

## 2021-12-28 RX ORDER — ASPIRIN 81 MG/1
TABLET ORAL
Qty: 90 TABLET | Refills: 1 | Status: SHIPPED | OUTPATIENT
Start: 2021-12-28

## 2021-12-28 RX ORDER — ONDANSETRON 4 MG/1
4 TABLET, ORALLY DISINTEGRATING ORAL EVERY 8 HOURS PRN
Qty: 20 TABLET | Refills: 0 | Status: SHIPPED | OUTPATIENT
Start: 2021-12-28

## 2021-12-28 RX ORDER — LOSARTAN POTASSIUM 25 MG/1
25 TABLET ORAL DAILY
Qty: 90 TABLET | Refills: 1 | Status: SHIPPED | OUTPATIENT
Start: 2021-12-29

## 2021-12-28 RX ORDER — TRAMADOL HYDROCHLORIDE 50 MG/1
50 TABLET ORAL EVERY 6 HOURS PRN
Qty: 60 TABLET | Refills: 0 | Status: SHIPPED | OUTPATIENT
Start: 2021-12-28

## 2021-12-28 RX ORDER — PSEUDOEPHEDRINE HCL 30 MG
100 TABLET ORAL 2 TIMES DAILY PRN
Qty: 30 CAPSULE | Refills: 0 | Status: SHIPPED | OUTPATIENT
Start: 2021-12-28

## 2021-12-28 RX ORDER — ATORVASTATIN CALCIUM 40 MG/1
40 TABLET, FILM COATED ORAL NIGHTLY
Qty: 90 TABLET | Refills: 1 | Status: SHIPPED | OUTPATIENT
Start: 2021-12-28

## 2021-12-28 RX ORDER — CLOPIDOGREL BISULFATE 75 MG/1
75 TABLET ORAL DAILY
Qty: 90 TABLET | Refills: 1 | Status: SHIPPED | OUTPATIENT
Start: 2021-12-29

## 2021-12-28 RX ORDER — FUROSEMIDE 20 MG/1
TABLET ORAL
Qty: 10 TABLET | Refills: 0 | Status: SHIPPED | OUTPATIENT
Start: 2021-12-28

## 2021-12-28 NOTE — PROGRESS NOTES
[facility]     CV Surgery Progress Note    Angela Murphy Patient Status:  Inpatient    1960 MRN [id number]   [facility] 8NE-A Attending Cisco Piper MD   Hosp Day # 7 PCP Kunal Shine MD     Subjective:   Had episode of dean stable- monitor   -Volume OL, weight up from baseline- continue outpatient lasix   -Renal function intact, good UOP  -UTI, on ancef   -Bowel regimen   -Nausea, improved   -Check echo today   -Pain management, prn tylenol or tramadol   -Chest tubes and PW r

## 2021-12-28 NOTE — PROGRESS NOTES
NURSING DISCHARGE NOTE    Discharged Home via Wheelchair. Accompanied by Support staff  Belongings Taken by patient/family. Patient dc'd home with all belongings. Watched [address] discharge video.  Thorough details of care at home, sternal precautions, s

## 2021-12-28 NOTE — PAYOR COMM NOTE
--------------  CONTINUED STAY REVIEW    Payor: [address]PO  Subscriber #:  [id number]  Authorization Number: [id number]    Admit date: 12/21/21  Admit time:  6:12 PM    FAXING CLINICAL UPDATE FOR 12/26/21- 12/27/21 12/26/21  Subjective:    P CP earlier but resolved. No F/C.       Temp:  [97.8 °F (36.6 °C)-98.7 °F (37.1 °C)] 98.1 °F (36.7 °C)  Pulse:  [69-82] 69  Resp:  [18] 18  BP: (107-149)/(47-66) 130/64  Respiratory:  decreased breath sounds.   Cardiovascular: S1, S2. Regular rate and rhythm Date Action Dose Route     12/28/2021 0829 Given 100 mg Oral     12/27/2021 2154 Given 100 mg Oral       furosemide (LASIX) injection 40 mg     Date Action Dose Route     12/28/2021 0828 Given 40 mg Intravenous       insulin detemir (LEVEMIR) 100 UNIT/ML f

## 2021-12-28 NOTE — PROGRESS NOTES
[facility]     Hospitalist Progress Note     Meg Arango Patient Status:  Inpatient    1960 MRN [id number]   [facility] 8NE-A Attending Lefty Tang MD   Hosp Day # 7 PCP Maria Antonia Yepez MD     Chief Complaint: Medical manag 143   K 4.5   < > 5.1 4.2 4.7      < > 110 115* 110   CO2 25.0   < > 20.0* 24.0 29.0   ALKPHO 77  --   --   --   --    AST 15  --   --   --   --    ALT 22  --   --   --   --    BILT 0.2  --   --   --   --    TP 6.3*  --   --   --   --     < > = value Sincere Ayala MD      Supplementary Documentation:     Quality:  · DVT Prophylaxis: SCD  · CODE status: full  · Kumar: no  · Central line: no  · If COVID testing is negative, may discontinue isolation: yes     Will the patient be referred to [facility] on discharge

## 2021-12-28 NOTE — PLAN OF CARE
Assumed care at [address]. Alert and oriented x4. C/O extreme midsternal incision pain. EKG done - normal sinus rhythm. VSS. 2L O2 while in bed for comfort. Pain unaffected by tramadol - was relieved by morphine.  Did not c/o nausea or request zofran on [address] sh[address] optimize hemodynamic stability  - Monitor arterial and/or venous puncture sites for bleeding and/or hematoma  - Assess quality of pulses, skin color and temperature  - Assess for signs of decreased coronary artery perfusion - ex.  Angina  - Evaluate fluid b of falls.   - [place] fall precautions as indicated by assessment.  - Educate pt/family on patient safety including physical limitations  - Instruct pt to call for assistance with activity based on assessment  - Modify environment to reduce risk of injury

## 2021-12-28 NOTE — PLAN OF CARE
Patient alert and oriented x4. On RA. NSR on tele. Continent of bowel and bladder. Complaints of sternal pain and jaw pain. Patient up with standby assist. ECHO completed this AM. POC: possible discharge today. Call light within reach.      Problem: Diabete baseline  Description: INTERVENTIONS:  - Continuous cardiac monitoring, monitor vital signs, obtain 12 lead EKG if indicated  - Evaluate effectiveness of antiarrhythmic and heart rate control medications as ordered  - Initiate emergency measures for life t strengthening/mobility  - Encourage toileting schedule  Outcome: Progressing

## 2021-12-28 NOTE — PROGRESS NOTES
[facility]     Cardiology Progress Note    Wandy [place] Patient Status:  Inpatient    1960 MRN [id number]   [facility] 8NE-A Attending Ulysses Dorsey MD   Hosp Day # 7 PCP Malka Hernandez MD       SUBJECTIVE:  No cardiac events habits. : Denies any difficulty with urination. Neuro:  Denies any headaches, focal weakness or paresthesia. All other systems reviewed and are negative.       Labs:     Recent Labs   Lab 12/22/21  8797 12/23/21  0437 12/23/21  1211 12/23/21  1246 1 GM/118ML enema 133 mL, 1 enema, Rectal, Once PRN  furosemide (LASIX) injection 40 mg, 40 mg, Intravenous, Daily  clopidogrel (PLAVIX) tab 75 mg, 75 mg, Oral, Daily  insulin detemir (LEVEMIR) 100 UNIT/ML flextouch 65 Units, 65 Units, Subcutaneous, Nightly chewable tab 4 tablet, 4 tablet, Oral, Q15 Min PRN   Or  dextrose 50 % injection 50 mL, 50 mL, Intravenous, Q15 Min PRN   Or  glucose (DEX4) oral liquid 30 g, 30 g, Oral, Q15 Min PRN   Or  glucose-vitamin C (DEX-4) chewable tab 8 tablet, 8 tablet, Oral, Q1

## 2021-12-29 NOTE — DISCHARGE SUMMARY
[facility]  Discharge Summary    Yvonne Bourgeois Patient Status:  Inpatient    1960 MRN [id number]   [facility] 8NE-A Attending No att. providers found   Hosp Day # 7 PCP Martha Machado MD     Admit date: 2021    Discharge

## 2021-12-29 NOTE — PAYOR COMM NOTE
--------------  DISCHARGE REVIEW    Payor: [address]  Subscriber #:  [id number]  Authorization Number: [id number]    Admit date: 12/21/21  Admit time:   6:12 PM  Discharge Date: 12/28/2021  3:20 PM     Admitting Physician: Caden Darby MD  At

## 2021-12-30 ENCOUNTER — PATIENT OUTREACH (OUTPATIENT)
Dept: CASE MANAGEMENT | Age: 61
End: 2021-12-30

## 2021-12-30 NOTE — PROGRESS NOTES
1st Attempt at scheduling & DM Follow Up Questions             Yogi Cochran MD Family Medicine, IP Consult to Primary Care Schedule an appointment as soon as possible for a visit in 1 week  [address] [address]        U

## 2022-01-05 NOTE — PROGRESS NOTES
Pts spouse Gabriel Peralta called TST line left VM, did not state what or if pt is needing anything but did mention pt has f/up appt on 1/6 with Cardiologist. Please contact pt or spouse back.

## 2022-01-06 ENCOUNTER — HOSPITAL ENCOUNTER (OUTPATIENT)
Dept: GENERAL RADIOLOGY | Facility: HOSPITAL | Age: 62
Discharge: HOME | End: 2022-01-06
Attending: THORACIC SURGERY (CARDIOTHORACIC VASCULAR SURGERY)
Payer: COMMERCIAL

## 2022-01-06 DIAGNOSIS — J90 PLEURAL EFFUSION: ICD-10-CM

## 2022-01-06 PROCEDURE — 71048 X-RAY EXAM CHEST 4+ VIEWS: CPT | Performed by: THORACIC SURGERY (CARDIOTHORACIC VASCULAR SURGERY)

## 2022-01-06 NOTE — PROGRESS NOTES
Received voice mail requesting assistance                 Sumanth Clements MD Family Medicine, IP Consult to Primary Care Schedule an appointment as soon as possible for a visit in 1 week  [address]

## 2022-02-03 ENCOUNTER — ORDER TRANSCRIPTION (OUTPATIENT)
Dept: CARDIAC REHAB | Facility: HOSPITAL | Age: 62
End: 2022-02-03

## 2022-02-10 ENCOUNTER — LAB ENCOUNTER (OUTPATIENT)
Dept: LAB | Facility: HOSPITAL | Age: 62
End: 2022-02-10
Attending: NURSE PRACTITIONER
Payer: COMMERCIAL

## 2022-02-10 ENCOUNTER — CARDPULM VISIT (OUTPATIENT)
Dept: CARDIAC REHAB | Facility: HOSPITAL | Age: 62
End: 2022-02-10
Attending: INTERNAL MEDICINE
Payer: COMMERCIAL

## 2022-02-10 DIAGNOSIS — I21.9 MYOCARDIAL INFARCT (HCC): Primary | ICD-10-CM

## 2022-02-10 LAB
ALBUMIN SERPL-MCNC: 3.2 G/DL (ref 3.4–5)
ALBUMIN/GLOB SERPL: 0.8 {RATIO} (ref 1–2)
ALP LIVER SERPL-CCNC: 79 U/L
ALT SERPL-CCNC: 14 U/L
ANION GAP SERPL CALC-SCNC: 4 MMOL/L (ref 0–18)
AST SERPL-CCNC: 11 U/L (ref 15–37)
BASOPHILS # BLD AUTO: 0.02 X10(3) UL (ref 0–0.2)
BASOPHILS NFR BLD AUTO: 0.2 %
BILIRUB SERPL-MCNC: 0.3 MG/DL (ref 0.1–2)
BUN BLD-MCNC: 21 MG/DL (ref 7–18)
CALCIUM BLD-MCNC: 9.3 MG/DL (ref 8.5–10.1)
CHLORIDE SERPL-SCNC: 109 MMOL/L (ref 98–112)
CK SERPL-CCNC: 70 U/L
CO2 SERPL-SCNC: 26 MMOL/L (ref 21–32)
CREAT BLD-MCNC: 0.66 MG/DL
EOSINOPHIL # BLD AUTO: 0.18 X10(3) UL (ref 0–0.7)
EOSINOPHIL NFR BLD AUTO: 1.7 %
ERYTHROCYTE [DISTWIDTH] IN BLOOD BY AUTOMATED COUNT: 14.1 %
ERYTHROCYTE [SEDIMENTATION RATE] IN BLOOD: 53 MM/HR
GLOBULIN PLAS-MCNC: 4 G/DL (ref 2.8–4.4)
GLUCOSE BLD-MCNC: 134 MG/DL (ref 70–99)
GLUCOSE BLD-MCNC: 73 MG/DL (ref 70–99)
GLUCOSE BLD-MCNC: 89 MG/DL (ref 70–99)
HCT VFR BLD AUTO: 39.6 %
HGB BLD-MCNC: 12.3 G/DL
IMM GRANULOCYTES # BLD AUTO: 0.02 X10(3) UL (ref 0–1)
IMM GRANULOCYTES NFR BLD: 0.2 %
LYMPHOCYTES # BLD AUTO: 3.92 X10(3) UL (ref 1–4)
LYMPHOCYTES NFR BLD AUTO: 36.8 %
MCH RBC QN AUTO: 27.1 PG (ref 26–34)
MCHC RBC AUTO-ENTMCNC: 31.1 G/DL (ref 31–37)
MCV RBC AUTO: 87.2 FL
MONOCYTES # BLD AUTO: 0.86 X10(3) UL (ref 0.1–1)
MONOCYTES NFR BLD AUTO: 8.1 %
NEUTROPHILS # BLD AUTO: 5.65 X10 (3) UL (ref 1.5–7.7)
NEUTROPHILS # BLD AUTO: 5.65 X10(3) UL (ref 1.5–7.7)
NEUTROPHILS NFR BLD AUTO: 53 %
OSMOLALITY SERPL CALC.SUM OF ELEC: 293 MOSM/KG (ref 275–295)
PLATELET # BLD AUTO: 223 10(3)UL (ref 150–450)
POTASSIUM SERPL-SCNC: 4.1 MMOL/L (ref 3.5–5.1)
PROT SERPL-MCNC: 7.2 G/DL (ref 6.4–8.2)
RBC # BLD AUTO: 4.54 X10(6)UL
SODIUM SERPL-SCNC: 139 MMOL/L (ref 136–145)
WBC # BLD AUTO: 10.7 X10(3) UL (ref 4–11)

## 2022-02-10 PROCEDURE — 82962 GLUCOSE BLOOD TEST: CPT

## 2022-02-10 PROCEDURE — 80053 COMPREHEN METABOLIC PANEL: CPT

## 2022-02-10 PROCEDURE — 93798 PHYS/QHP OP CAR RHAB W/ECG: CPT

## 2022-02-10 PROCEDURE — 85025 COMPLETE CBC W/AUTO DIFF WBC: CPT

## 2022-02-10 PROCEDURE — 85652 RBC SED RATE AUTOMATED: CPT

## 2022-02-10 PROCEDURE — 36415 COLL VENOUS BLD VENIPUNCTURE: CPT

## 2022-02-10 PROCEDURE — 82550 ASSAY OF CK (CPK): CPT

## 2022-02-11 ENCOUNTER — CARDPULM VISIT (OUTPATIENT)
Dept: CARDIAC REHAB | Facility: HOSPITAL | Age: 62
End: 2022-02-11
Attending: INTERNAL MEDICINE
Payer: COMMERCIAL

## 2022-02-11 PROCEDURE — 93798 PHYS/QHP OP CAR RHAB W/ECG: CPT

## 2022-02-14 ENCOUNTER — CARDPULM VISIT (OUTPATIENT)
Dept: CARDIAC REHAB | Facility: HOSPITAL | Age: 62
End: 2022-02-14
Attending: INTERNAL MEDICINE
Payer: COMMERCIAL

## 2022-02-14 PROCEDURE — 93798 PHYS/QHP OP CAR RHAB W/ECG: CPT

## 2022-02-16 ENCOUNTER — CARDPULM VISIT (OUTPATIENT)
Dept: CARDIAC REHAB | Facility: HOSPITAL | Age: 62
End: 2022-02-16
Attending: INTERNAL MEDICINE
Payer: COMMERCIAL

## 2022-02-16 PROCEDURE — 93798 PHYS/QHP OP CAR RHAB W/ECG: CPT

## 2022-02-18 ENCOUNTER — CARDPULM VISIT (OUTPATIENT)
Dept: CARDIAC REHAB | Facility: HOSPITAL | Age: 62
End: 2022-02-18
Attending: INTERNAL MEDICINE
Payer: COMMERCIAL

## 2022-02-18 PROCEDURE — 93798 PHYS/QHP OP CAR RHAB W/ECG: CPT

## 2022-02-21 ENCOUNTER — CARDPULM VISIT (OUTPATIENT)
Dept: CARDIAC REHAB | Facility: HOSPITAL | Age: 62
End: 2022-02-21
Attending: INTERNAL MEDICINE
Payer: COMMERCIAL

## 2022-02-21 PROCEDURE — 93798 PHYS/QHP OP CAR RHAB W/ECG: CPT

## 2022-02-23 ENCOUNTER — OFFICE VISIT (OUTPATIENT)
Dept: WOUND CARE | Facility: HOSPITAL | Age: 62
End: 2022-02-23
Attending: NURSE PRACTITIONER
Payer: COMMERCIAL

## 2022-02-23 ENCOUNTER — CARDPULM VISIT (OUTPATIENT)
Dept: CARDIAC REHAB | Facility: HOSPITAL | Age: 62
End: 2022-02-23
Attending: INTERNAL MEDICINE
Payer: COMMERCIAL

## 2022-02-23 VITALS
DIASTOLIC BLOOD PRESSURE: 78 MMHG | HEIGHT: 64 IN | TEMPERATURE: 98 F | BODY MASS INDEX: 27.31 KG/M2 | WEIGHT: 160 LBS | HEART RATE: 111 BPM | RESPIRATION RATE: 18 BRPM | SYSTOLIC BLOOD PRESSURE: 143 MMHG

## 2022-02-23 DIAGNOSIS — E11.65 UNCONTROLLED TYPE 2 DIABETES MELLITUS WITH HYPERGLYCEMIA (HCC): ICD-10-CM

## 2022-02-23 DIAGNOSIS — E11.628 DIABETES WITH SKIN COMPLICATION (HCC): ICD-10-CM

## 2022-02-23 DIAGNOSIS — T81.32XD STERNAL WOUND DEHISCENCE, SUBSEQUENT ENCOUNTER: Primary | ICD-10-CM

## 2022-02-23 LAB — GLUCOSE BLD-MCNC: 318 MG/DL (ref 70–99)

## 2022-02-23 PROCEDURE — 99215 OFFICE O/P EST HI 40 MIN: CPT | Performed by: NURSE PRACTITIONER

## 2022-02-23 PROCEDURE — 93798 PHYS/QHP OP CAR RHAB W/ECG: CPT

## 2022-02-23 RX ORDER — GENTAMICIN SULFATE 1 MG/G
1 OINTMENT TOPICAL 2 TIMES DAILY
Qty: 45 G | Refills: 0 | Status: SHIPPED | OUTPATIENT
Start: 2022-02-23 | End: 2022-03-09

## 2022-02-25 ENCOUNTER — APPOINTMENT (OUTPATIENT)
Dept: CARDIAC REHAB | Facility: HOSPITAL | Age: 62
End: 2022-02-25
Attending: INTERNAL MEDICINE
Payer: COMMERCIAL

## 2022-02-25 PROCEDURE — 93798 PHYS/QHP OP CAR RHAB W/ECG: CPT

## 2022-02-25 RX ORDER — CEPHALEXIN 500 MG/1
500 CAPSULE ORAL 3 TIMES DAILY
Qty: 30 CAPSULE | Refills: 0 | Status: SHIPPED | OUTPATIENT
Start: 2022-02-25 | End: 2022-03-07

## 2022-02-25 NOTE — PROGRESS NOTES
Please call patient and let her know that her culture was positive for staph (which is a common skin bacteria). Let her know that she should continue with the gentamicin topical, start keflex tid.  I sent the rx to her pharmacy

## 2022-02-25 NOTE — PROGRESS NOTES
Spoke to patient - discussed positive culture results, continue topical antibiotic as ordered at visit. Also new order for keflex 3 times a day - sent to pharmacy, she verbalizes understanding. Patient advised to call if there are any questions or concerns.

## 2022-02-28 ENCOUNTER — CARDPULM VISIT (OUTPATIENT)
Dept: CARDIAC REHAB | Facility: HOSPITAL | Age: 62
End: 2022-02-28
Attending: INTERNAL MEDICINE
Payer: COMMERCIAL

## 2022-02-28 PROCEDURE — 93798 PHYS/QHP OP CAR RHAB W/ECG: CPT

## 2022-03-01 ENCOUNTER — APPOINTMENT (OUTPATIENT)
Dept: WOUND CARE | Facility: HOSPITAL | Age: 62
End: 2022-03-01
Attending: NURSE PRACTITIONER

## 2022-03-02 ENCOUNTER — CARDPULM VISIT (OUTPATIENT)
Dept: CARDIAC REHAB | Facility: HOSPITAL | Age: 62
End: 2022-03-02
Attending: INTERNAL MEDICINE
Payer: COMMERCIAL

## 2022-03-02 ENCOUNTER — OFFICE VISIT (OUTPATIENT)
Dept: WOUND CARE | Facility: HOSPITAL | Age: 62
End: 2022-03-02
Attending: NURSE PRACTITIONER
Payer: COMMERCIAL

## 2022-03-02 VITALS
RESPIRATION RATE: 16 BRPM | TEMPERATURE: 98 F | HEART RATE: 76 BPM | SYSTOLIC BLOOD PRESSURE: 109 MMHG | DIASTOLIC BLOOD PRESSURE: 69 MMHG

## 2022-03-02 DIAGNOSIS — E11.65 UNCONTROLLED TYPE 2 DIABETES MELLITUS WITH HYPERGLYCEMIA (HCC): ICD-10-CM

## 2022-03-02 DIAGNOSIS — E11.628 DIABETES WITH SKIN COMPLICATION (HCC): ICD-10-CM

## 2022-03-02 DIAGNOSIS — B95.61 METHICILLIN SUSCEPTIBLE STAPHYLOCOCCUS AUREUS INFECTION AS THE CAUSE OF DISEASES CLASSIFIED ELSEWHERE: ICD-10-CM

## 2022-03-02 DIAGNOSIS — T81.32XD STERNAL WOUND DEHISCENCE, SUBSEQUENT ENCOUNTER: Primary | ICD-10-CM

## 2022-03-02 LAB — GLUCOSE BLD-MCNC: 315 MG/DL (ref 70–99)

## 2022-03-02 PROCEDURE — 93798 PHYS/QHP OP CAR RHAB W/ECG: CPT

## 2022-03-02 PROCEDURE — 99214 OFFICE O/P EST MOD 30 MIN: CPT | Performed by: NURSE PRACTITIONER

## 2022-03-02 NOTE — PROGRESS NOTES
.Weekly Wound Education Note    Teaching Provided To: Patient  Training Topics: Cleasing and general instructions; Discharge instructions;Dressing  Training Method: Explain/Verbal;Written  Training Response: Patient responds and understands           Patient to continue applying Gentamicin ointment twice daily and continue oral antibiotic.

## 2022-03-03 ENCOUNTER — OFFICE VISIT (OUTPATIENT)
Dept: ENDOCRINOLOGY CLINIC | Facility: CLINIC | Age: 62
End: 2022-03-03
Payer: COMMERCIAL

## 2022-03-03 VITALS
HEART RATE: 79 BPM | SYSTOLIC BLOOD PRESSURE: 124 MMHG | OXYGEN SATURATION: 99 % | WEIGHT: 164.38 LBS | DIASTOLIC BLOOD PRESSURE: 58 MMHG | RESPIRATION RATE: 16 BRPM | BODY MASS INDEX: 28 KG/M2

## 2022-03-03 DIAGNOSIS — E11.65 TYPE 2 DIABETES MELLITUS WITH HYPERGLYCEMIA, WITH LONG-TERM CURRENT USE OF INSULIN (HCC): Primary | ICD-10-CM

## 2022-03-03 DIAGNOSIS — I25.10 CORONARY ARTERY DISEASE INVOLVING NATIVE CORONARY ARTERY OF NATIVE HEART, UNSPECIFIED WHETHER ANGINA PRESENT: ICD-10-CM

## 2022-03-03 DIAGNOSIS — Z79.4 TYPE 2 DIABETES MELLITUS WITH HYPERGLYCEMIA, WITH LONG-TERM CURRENT USE OF INSULIN (HCC): Primary | ICD-10-CM

## 2022-03-03 LAB
CARTRIDGE LOT#: 899 NUMERIC
HBA1C MFR BLD: 8.3 %
HEMOGLOBIN A1C: 8.3 % (ref 4.3–5.6)

## 2022-03-03 PROCEDURE — 3078F DIAST BP <80 MM HG: CPT | Performed by: NURSE PRACTITIONER

## 2022-03-03 PROCEDURE — 83036 HEMOGLOBIN GLYCOSYLATED A1C: CPT | Performed by: NURSE PRACTITIONER

## 2022-03-03 PROCEDURE — 3074F SYST BP LT 130 MM HG: CPT | Performed by: NURSE PRACTITIONER

## 2022-03-03 PROCEDURE — 99215 OFFICE O/P EST HI 40 MIN: CPT | Performed by: NURSE PRACTITIONER

## 2022-03-03 RX ORDER — BLOOD-GLUCOSE TRANSMITTER
1 EACH MISCELLANEOUS
Qty: 1 EACH | Refills: 3 | Status: SHIPPED | OUTPATIENT
Start: 2022-03-03 | End: 2022-03-28

## 2022-03-03 RX ORDER — BLOOD-GLUCOSE SENSOR
1 EACH MISCELLANEOUS
Qty: 9 EACH | Refills: 3 | Status: SHIPPED | OUTPATIENT
Start: 2022-03-03

## 2022-03-03 NOTE — PATIENT INSTRUCTIONS
Summary from visit:  Last A1c value was 8.3% done 3/3/2022. DIABETES MEDICATIONS:   Tresiba 50 units daily  Jardiance 10 mg daily  Hold insulin aspart for now  1. In order to determine any patterns in your blood sugars, you will need to test your blood sugar 3-4 times daily. It would be best to change up the times of day that you are testing your sugar. Always test before breakfast (fasting) and then alternate testing blood sugar 1-2 hours after your meals. 2. Dexcom order sent to pharmacy. 3. You have been prescribed a class of medication known as SGLT2 (sodium-glucose cotransporter 2 inhibitor). This medication will remove extra sugar out of blood into your urine. This medication has a positive effect on blood pressure, weight and most importantly blood sugars. While taking this medication please remember:     1. It can be dosed anytime of day but morning is probably best time to take it due to increase in urination effect. 2. Please drink at least 4 glasses of water daily to avoid dehydration. 3. It does not cause low blood sugars (hypoglycemia). If you develop any low blood sugars, we will need to adjust other medications. 4. Please call me if you develop any symptoms of urinary tract infection or yeast infection. We will see if dexcom is covered. Glucose sensors are covered either under your pharmacy benefits or your medical insurance     We start by sending the prescription to your local pharmacy to have them check benefits. Your local pharmacy will run the prescription and see if Dexcom is covered under your pharmacy benefits. If it is covered under pharmacy, they will contact you about cost and . This process usually takes 2-3 days.    Please let us know if the cost is over $100 because this higher cost may indicate it is not covered under pharmacy       It it is not covered under pharmacy or you have Medicare or Medicaid insurance, the order process and approval process may take up to 4 weeks. The medical plan will require chart notes and review of medications before approval . If and when approved, the sensor will be shipped directly to you from the supplier but they will first contact you to arrange shipping or billing in case there is an out of pocket co pay. When you have notification that the sensor is ready for pick or being shipped, please call the Diabetes center to arrange training to start on the device with one of the diabetes educators. Feel free to  open the box and review the contents and there are also tutorials you can start reviewing to become familiar with the device before training. Needing to remove/replace sensors: The continuous glucose sensor may have to be removed when going for imaging studies ( examples: xrays, MRI or CT scans)  You can call the Diabetes center or the customer support line to ask if your sensor needs to be removed prior to the test    Any time a patient sensor cannot be worn for a full 10  (dexcom) or 14 days (Jeannette Lee), you can call customer service support and get a replacement   Remember your insurance will only cover:   Dexcom 3/month     Dexcom : [phone]- press #2 for Technical Support     Instructions for Dexcom sharing with [address]:     Information on your Dexcom glucose readings are not shared between our clinic and you until the CLARITY invitation to share has been accepted by the patient. Invitations are either printed or emailed. Mobile users can also initiate or accept sharing with CLARITY reports ernst. Once the invitation is share, we will have the ability to view your blood sugar trends. Please be aware we will only go into Dexcom Clarity and view your sugars when you request for us to do so. We will not be alerted if you are having highs or lows. You have to notify us to review these trends        Return in about 3 weeks (around 3/24/2022) for follow up.    _ _ _ _ _ _ _ _ _ _ _ _ _ _ _ _ _ _ _ _ _ _ _ _ _ _ _ _ _ _ _ _ _ _ _ _ _ _ _ _ _ _ _ _ _   Last A1c value was 8.3% done 3/3/2022. The A1C test provides us with your average blood sugar for the past 3 months. The main goal of diabetes treatment is to keep your sugar from going too high. We measure your overall blood sugar trends with a Hemoglobin A1C test. (also called an A1C)  For most people the target is less than 7.0% but sometimes we make exceptions based on age, health history and other factors. Keeping an A1C less than 7% helps prevents diabetes related health problems. If your A1C goes too high, then we need to talk about changing your current diabetes treatment. Blood sugar targets:  Before breakfast:   (preferably < 110)  2 hours After meals: less than 180 (preferably less than 150)   Call for persistent blood sugars < 75 mg/dl or > 200 mg/dl   If you are on insulin and have two blood sugar readings < 80 mg/dl in 1 week please call me. Blood sugars greater than 200 are not acceptable to reach your goal of improving diabetes   Medications: It is important to take all of your medications as prescribed. Please call me if you cannot get the prescriptions filled or are having issues with refills. Also, please call me if you have any issues with medication questions, side effects, dosing questions or problems with your blood sugar trends BEFORE CHANGING OR STOPPING ANY MEDICATIONS. Appointments:  Always bring your glucose meter or blood sugar logbook to every appointment here at the diabetes center. This allows us to safely make adjustments to your diabetes plan. 1. LABS: It is important to monitor your kidney function (blood and urine protein levels) , liver function tests and cholesterol levels when you have diabetes. If your primary care provider has not ordered these tests, I will order them for you.    2. FOOT exams:  daily foot inspections for foot wounds or skin changes are important for foot care. Any unusual changes should be reported immediately. 3. EYE exams: Checking your eyes for diabetes changes is important and you should have a dilated eye exam done by an eye doctor EVERY year since these changes occur in the BACK of the eye and not visible by you. We are her to support you with Diabetes but please remember that you still need your primary care doctor for your routine health maintenance.    Thank you,   Anastasia MCDONOUGH

## 2022-03-04 ENCOUNTER — CARDPULM VISIT (OUTPATIENT)
Dept: CARDIAC REHAB | Facility: HOSPITAL | Age: 62
End: 2022-03-04
Attending: INTERNAL MEDICINE
Payer: COMMERCIAL

## 2022-03-04 PROCEDURE — 93798 PHYS/QHP OP CAR RHAB W/ECG: CPT

## 2022-03-07 ENCOUNTER — CARDPULM VISIT (OUTPATIENT)
Dept: CARDIAC REHAB | Facility: HOSPITAL | Age: 62
End: 2022-03-07
Attending: INTERNAL MEDICINE
Payer: COMMERCIAL

## 2022-03-07 PROCEDURE — 93798 PHYS/QHP OP CAR RHAB W/ECG: CPT

## 2022-03-09 ENCOUNTER — CARDPULM VISIT (OUTPATIENT)
Dept: CARDIAC REHAB | Facility: HOSPITAL | Age: 62
End: 2022-03-09
Attending: INTERNAL MEDICINE
Payer: COMMERCIAL

## 2022-03-09 ENCOUNTER — OFFICE VISIT (OUTPATIENT)
Dept: WOUND CARE | Facility: HOSPITAL | Age: 62
End: 2022-03-09
Attending: NURSE PRACTITIONER
Payer: COMMERCIAL

## 2022-03-09 VITALS
HEART RATE: 80 BPM | RESPIRATION RATE: 18 BRPM | TEMPERATURE: 98 F | DIASTOLIC BLOOD PRESSURE: 71 MMHG | SYSTOLIC BLOOD PRESSURE: 108 MMHG

## 2022-03-09 DIAGNOSIS — E11.65 UNCONTROLLED TYPE 2 DIABETES MELLITUS WITH HYPERGLYCEMIA (HCC): ICD-10-CM

## 2022-03-09 DIAGNOSIS — E11.628 DIABETES WITH SKIN COMPLICATION (HCC): ICD-10-CM

## 2022-03-09 DIAGNOSIS — T81.32XD STERNAL WOUND DEHISCENCE, SUBSEQUENT ENCOUNTER: Primary | ICD-10-CM

## 2022-03-09 DIAGNOSIS — B95.61 METHICILLIN SUSCEPTIBLE STAPHYLOCOCCUS AUREUS INFECTION AS THE CAUSE OF DISEASES CLASSIFIED ELSEWHERE: ICD-10-CM

## 2022-03-09 PROCEDURE — 99214 OFFICE O/P EST MOD 30 MIN: CPT | Performed by: NURSE PRACTITIONER

## 2022-03-09 PROCEDURE — 93798 PHYS/QHP OP CAR RHAB W/ECG: CPT

## 2022-03-09 NOTE — PROGRESS NOTES
Weekly Wound Education Note    Teaching Provided To: Patient  Training Topics: Dressing;Cleasing and general instructions; Discharge instructions  Training Method: Explain/Verbal;Written  Training Response: Patient responds and understands           Wound closed, apply Coloplast TRIAD paste, bolster area with rolled gauze. Extra supplies sent with patient.

## 2022-03-11 ENCOUNTER — CARDPULM VISIT (OUTPATIENT)
Dept: CARDIAC REHAB | Facility: HOSPITAL | Age: 62
End: 2022-03-11
Attending: INTERNAL MEDICINE
Payer: COMMERCIAL

## 2022-03-11 PROCEDURE — 93798 PHYS/QHP OP CAR RHAB W/ECG: CPT

## 2022-03-14 ENCOUNTER — CARDPULM VISIT (OUTPATIENT)
Dept: CARDIAC REHAB | Facility: HOSPITAL | Age: 62
End: 2022-03-14
Attending: INTERNAL MEDICINE
Payer: COMMERCIAL

## 2022-03-16 ENCOUNTER — CARDPULM VISIT (OUTPATIENT)
Dept: CARDIAC REHAB | Facility: HOSPITAL | Age: 62
End: 2022-03-16
Attending: INTERNAL MEDICINE
Payer: COMMERCIAL

## 2022-03-16 PROCEDURE — 93798 PHYS/QHP OP CAR RHAB W/ECG: CPT

## 2022-03-18 ENCOUNTER — TELEPHONE (OUTPATIENT)
Dept: ENDOCRINOLOGY CLINIC | Facility: CLINIC | Age: 62
End: 2022-03-18

## 2022-03-18 ENCOUNTER — CARDPULM VISIT (OUTPATIENT)
Dept: CARDIAC REHAB | Facility: HOSPITAL | Age: 62
End: 2022-03-18
Attending: INTERNAL MEDICINE
Payer: COMMERCIAL

## 2022-03-18 PROCEDURE — 93798 PHYS/QHP OP CAR RHAB W/ECG: CPT

## 2022-03-18 RX ORDER — INSULIN DEGLUDEC INJECTION 100 U/ML
INJECTION, SOLUTION SUBCUTANEOUS
Qty: 60 ML | Refills: 2 | Status: SHIPPED | OUTPATIENT
Start: 2022-03-18

## 2022-03-19 NOTE — TELEPHONE ENCOUNTER
Pt. Feliciano Ch to return call regarding side effects she has been experiencing from Jardiance 10 mg. Pt. saw APN 3 wks ago & was provided w/Jardiance 10 mg samples. Was taking Tresiba 50 units as instructed but was experiencing fasting in the 50-60's mg/dL. From that point she stopped taking [place] but continued Jardiance 10 mg; is now out of samples . She also reports having vaginal itching for past week . Pt. Informed I would contact APN & return her call with instructions. Contacted APN & she instructed to stop Jardiance & continue w/o Tresiba until Monday . Dexcom Clarity report printed & placed on her desk for review:    CGM Analysis of data: dates  3/5/22-3/18/22  Sensor download: full report in media  Average glucose : 155 mg/dl   SD: 47 mg/dL  Coefficient of variation: 30.3%  29.1% time above 180mg /dl   3.2% time above 250 mg/dl  70% time in target range:  mg/dl   0.8% time below target range: 70mg/dl     Pt. Contacted & instructed to continue not taking Jardiance or Tresiba & APN will return her call on Monday. Pt. V/u & was agreeable w/plan. Family member

## 2022-03-21 ENCOUNTER — CARDPULM VISIT (OUTPATIENT)
Dept: CARDIAC REHAB | Facility: HOSPITAL | Age: 62
End: 2022-03-21
Attending: INTERNAL MEDICINE
Payer: COMMERCIAL

## 2022-03-21 ENCOUNTER — TELEPHONE (OUTPATIENT)
Dept: PAIN CLINIC | Facility: CLINIC | Age: 62
End: 2022-03-21

## 2022-03-21 PROCEDURE — 93798 PHYS/QHP OP CAR RHAB W/ECG: CPT

## 2022-03-21 NOTE — TELEPHONE ENCOUNTER
Contacted pt. because her blood sugars became elevated when she stopped the Jardiance 10 mg due to perineal itching. I explained that she may have to resume insulins in order to control readings. Pt. Informed I will check w/APN & return her call.

## 2022-03-21 NOTE — TELEPHONE ENCOUNTER
Received a letter for pt to be seen by CORRIE Hardwick. Receive a letter pt has some records in [facility] Please advise if appropriate to schedule pt. Appt request placed in RN bin for MD review.

## 2022-03-21 NOTE — TELEPHONE ENCOUNTER
Spoke with pt and directed her to restart tresiba 10 units daily and to continue to hold the jardiance until reevaluated on 3/24/22. Pt gave verbal understanding.

## 2022-03-21 NOTE — TELEPHONE ENCOUNTER
Pt called in stating since stopping the jardiance and tresiba, she has been in the 200s or higher, currently 308. Asymptomatic. Still with perineal itching per pt, but slightly improved. She likes the affect jardiance has on her BG, but unsure if will be able to tolerate. Restart tresiba?

## 2022-03-21 NOTE — TELEPHONE ENCOUNTER
Please advise patient to restart Tresiba at 10 units injection daily and continue to hold Jardiance at this time. Tobias Jeans will evaluate further during upcoming visit as scheduled on 3/24/2022.     Kevin MCDONOUGH, BC-ADM, CDCES

## 2022-03-21 NOTE — TELEPHONE ENCOUNTER
Spoke with pt and she stopped the trsiba on 3/4/22 because the day before 3/3/22 when she saw Frannie Rumps she changed it to 50 unis, but is the middle of the night she waking up because she was running between the 50's and 70's. She stopped taking the jardiance on Friday 3/18/22

## 2022-03-23 ENCOUNTER — CARDPULM VISIT (OUTPATIENT)
Dept: CARDIAC REHAB | Facility: HOSPITAL | Age: 62
End: 2022-03-23
Attending: INTERNAL MEDICINE
Payer: COMMERCIAL

## 2022-03-23 ENCOUNTER — TELEPHONE (OUTPATIENT)
Dept: WOUND CARE | Facility: HOSPITAL | Age: 62
End: 2022-03-23

## 2022-03-23 ENCOUNTER — APPOINTMENT (OUTPATIENT)
Dept: WOUND CARE | Facility: HOSPITAL | Age: 62
End: 2022-03-23
Attending: NURSE PRACTITIONER
Payer: COMMERCIAL

## 2022-03-23 ENCOUNTER — TELEPHONE (OUTPATIENT)
Dept: CARDIOLOGY UNIT | Facility: HOSPITAL | Age: 62
End: 2022-03-23

## 2022-03-23 DIAGNOSIS — B95.61 METHICILLIN SUSCEPTIBLE STAPHYLOCOCCUS AUREUS INFECTION AS THE CAUSE OF DISEASES CLASSIFIED ELSEWHERE: ICD-10-CM

## 2022-03-23 DIAGNOSIS — E11.65 UNCONTROLLED TYPE 2 DIABETES MELLITUS WITH HYPERGLYCEMIA (HCC): ICD-10-CM

## 2022-03-23 DIAGNOSIS — T81.32XD STERNAL WOUND DEHISCENCE, SUBSEQUENT ENCOUNTER: Primary | ICD-10-CM

## 2022-03-23 DIAGNOSIS — E11.628 DIABETES WITH SKIN COMPLICATION (HCC): ICD-10-CM

## 2022-03-23 PROCEDURE — 93798 PHYS/QHP OP CAR RHAB W/ECG: CPT

## 2022-03-23 NOTE — PROGRESS NOTES
Pt continues to c/o Left leg neurogenic pain despite increased activity, ultram 50mg q 6hrs, she has had 2 refills of ultram.  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 She states the leg \"throbs\" after being on her feet for a short period of time, this pain is not improving, as a matter of fact it is getting worse as she increases her activity. D/w CHRISTOPHER. Candelaria Mcrae and Dr. Avalos Hence, will refer pt to pain clinic.   Ramón Bassett RN  Clinical Coordinator  CV Surgery

## 2022-03-24 ENCOUNTER — TELEMEDICINE (OUTPATIENT)
Dept: ENDOCRINOLOGY CLINIC | Facility: CLINIC | Age: 62
End: 2022-03-24
Payer: COMMERCIAL

## 2022-03-24 DIAGNOSIS — M79.2 NERVE PAIN: ICD-10-CM

## 2022-03-24 DIAGNOSIS — E11.65 TYPE 2 DIABETES MELLITUS WITH HYPERGLYCEMIA, WITH LONG-TERM CURRENT USE OF INSULIN (HCC): Primary | ICD-10-CM

## 2022-03-24 DIAGNOSIS — Z79.4 TYPE 2 DIABETES MELLITUS WITH HYPERGLYCEMIA, WITH LONG-TERM CURRENT USE OF INSULIN (HCC): Primary | ICD-10-CM

## 2022-03-24 PROCEDURE — 95251 CONT GLUC MNTR ANALYSIS I&R: CPT | Performed by: NURSE PRACTITIONER

## 2022-03-24 PROCEDURE — 99214 OFFICE O/P EST MOD 30 MIN: CPT | Performed by: NURSE PRACTITIONER

## 2022-03-24 RX ORDER — GLIPIZIDE 5 MG/1
5 TABLET, FILM COATED, EXTENDED RELEASE ORAL
Qty: 90 TABLET | Refills: 0 | Status: SHIPPED | OUTPATIENT
Start: 2022-03-24

## 2022-03-24 RX ORDER — GABAPENTIN 300 MG/1
300 CAPSULE ORAL 3 TIMES DAILY
Qty: 90 CAPSULE | Refills: 0 | Status: SHIPPED | OUTPATIENT
Start: 2022-03-24

## 2022-03-25 ENCOUNTER — CARDPULM VISIT (OUTPATIENT)
Dept: CARDIAC REHAB | Facility: HOSPITAL | Age: 62
End: 2022-03-25
Attending: INTERNAL MEDICINE
Payer: COMMERCIAL

## 2022-03-25 PROCEDURE — 93798 PHYS/QHP OP CAR RHAB W/ECG: CPT

## 2022-03-28 ENCOUNTER — OFFICE VISIT (OUTPATIENT)
Dept: PAIN CLINIC | Facility: CLINIC | Age: 62
End: 2022-03-28
Payer: COMMERCIAL

## 2022-03-28 ENCOUNTER — CARDPULM VISIT (OUTPATIENT)
Dept: CARDIAC REHAB | Facility: HOSPITAL | Age: 62
End: 2022-03-28
Attending: INTERNAL MEDICINE
Payer: COMMERCIAL

## 2022-03-28 VITALS
DIASTOLIC BLOOD PRESSURE: 80 MMHG | HEART RATE: 94 BPM | WEIGHT: 164 LBS | OXYGEN SATURATION: 100 % | SYSTOLIC BLOOD PRESSURE: 110 MMHG | BODY MASS INDEX: 28 KG/M2

## 2022-03-28 DIAGNOSIS — Z95.1 STATUS POST CORONARY ARTERY BYPASS GRAFT: ICD-10-CM

## 2022-03-28 DIAGNOSIS — I25.10 CORONARY ARTERY DISEASE INVOLVING NATIVE CORONARY ARTERY OF NATIVE HEART, UNSPECIFIED WHETHER ANGINA PRESENT: Primary | ICD-10-CM

## 2022-03-28 PROCEDURE — 93798 PHYS/QHP OP CAR RHAB W/ECG: CPT

## 2022-03-28 PROCEDURE — 99204 OFFICE O/P NEW MOD 45 MIN: CPT | Performed by: ANESTHESIOLOGY

## 2022-03-28 PROCEDURE — 3079F DIAST BP 80-89 MM HG: CPT | Performed by: ANESTHESIOLOGY

## 2022-03-28 PROCEDURE — 3074F SYST BP LT 130 MM HG: CPT | Performed by: ANESTHESIOLOGY

## 2022-03-28 RX ORDER — ATORVASTATIN CALCIUM 40 MG/1
TABLET, FILM COATED ORAL
COMMUNITY
Start: 2022-03-25 | End: 2022-03-28

## 2022-03-28 RX ORDER — NITROGLYCERIN 0.4 MG/1
TABLET SUBLINGUAL
COMMUNITY
Start: 2022-01-10 | End: 2022-03-28

## 2022-03-28 RX ORDER — AMLODIPINE BESYLATE 10 MG/1
TABLET ORAL
COMMUNITY
Start: 2022-01-10 | End: 2022-03-28

## 2022-03-28 RX ORDER — INSULIN DETEMIR 100 [IU]/ML
INJECTION, SOLUTION SUBCUTANEOUS
COMMUNITY
Start: 2022-01-10 | End: 2022-03-28

## 2022-03-28 RX ORDER — EVOLOCUMAB 140 MG/ML
INJECTION, SOLUTION SUBCUTANEOUS
COMMUNITY
Start: 2022-03-10

## 2022-03-29 ENCOUNTER — TELEPHONE (OUTPATIENT)
Dept: PAIN CLINIC | Facility: CLINIC | Age: 62
End: 2022-03-29

## 2022-03-29 NOTE — TELEPHONE ENCOUNTER
OV notes do not indicate that a script for Tramadol would be sent to pharmacy, it states that pt is to use the tramadol she has available to her for pain control. Pt did not sign a narcotic contract and her last script was provided by [facility] for 30 tabs.

## 2022-03-29 NOTE — TELEPHONE ENCOUNTER
Pt called stated Dr Tri Epps was supposed to send a tramadol 50mg script to pharmacy and nothing was sent.     Please advise

## 2022-03-29 NOTE — TELEPHONE ENCOUNTER
Shannon Vicente MD  You 45 minutes ago (1:53 PM)     I did not offer to write her a script for tramadol. I said she can continue using her tramadol.

## 2022-03-30 ENCOUNTER — CARDPULM VISIT (OUTPATIENT)
Dept: CARDIAC REHAB | Facility: HOSPITAL | Age: 62
End: 2022-03-30
Attending: INTERNAL MEDICINE
Payer: COMMERCIAL

## 2022-03-30 PROCEDURE — 93798 PHYS/QHP OP CAR RHAB W/ECG: CPT

## 2022-03-30 NOTE — TELEPHONE ENCOUNTER
Contacted pt to relay info below. Pt is surprised, stating she was informed that a Pain Management physician would need to prescribe her pain meds. Asked pt if she is currently taking tramadol BID, which pt confirms. Advised pt that Pain Management is only required for high dose chronic narcotic medications and her current dosage can be managed by her PCP. Pt states the tramadol only takes the edge off and the gabapentin is not providing relief, therefore would like to know if Dr Jeri Paula can give her something stronger. Reiterated to pt that Dr Jeri Paula would like her to f/u in 2 weeks and that he is not willing to add any meds to her regimen at this time. Pt VIVEK.

## 2022-03-31 NOTE — TELEPHONE ENCOUNTER
Spoke to Dr Vesta Hernandez who is asking why Dr Comfort Joy has not taken over prescribing Tramadol as pt was referred to the pain clinic and we are supposed to \"prescribe whatever pain medications are needed. \" Dr Vesta Hernandez states that pt informed her that she is to continue rcv'ing tramadol from Dr Vesta Hernandez. Advised Dr Vesta Hernandez that this pt reported to Dr Comfort Joy that she is only taking tramadol 50 mg BID. Pt was also only recently started on gabapentin and Dr Comfort Joy instructed pt to allow 2 weeks for medication to reach a therapeutic level in her system and wants to re-evaluate, for which pt already has an OV scheduled. Informed Dr Vesta Hernandez that the Pain Clinic must manage medications that are 50 MME daily or above and pt's current dosage falls below that level. Dr Vesta Hernandez asked if this is a clinic protocol. Advised this is an [facility] protocol. Dr Debbie Florez.

## 2022-04-01 ENCOUNTER — CARDPULM VISIT (OUTPATIENT)
Dept: CARDIAC REHAB | Facility: HOSPITAL | Age: 62
End: 2022-04-01
Attending: INTERNAL MEDICINE
Payer: COMMERCIAL

## 2022-04-01 PROCEDURE — 93798 PHYS/QHP OP CAR RHAB W/ECG: CPT

## 2022-04-04 ENCOUNTER — APPOINTMENT (OUTPATIENT)
Dept: CARDIAC REHAB | Facility: HOSPITAL | Age: 62
End: 2022-04-04
Attending: INTERNAL MEDICINE
Payer: COMMERCIAL

## 2022-04-06 ENCOUNTER — CARDPULM VISIT (OUTPATIENT)
Dept: CARDIAC REHAB | Facility: HOSPITAL | Age: 62
End: 2022-04-06
Attending: INTERNAL MEDICINE
Payer: COMMERCIAL

## 2022-04-06 PROCEDURE — 93798 PHYS/QHP OP CAR RHAB W/ECG: CPT

## 2022-04-08 ENCOUNTER — APPOINTMENT (OUTPATIENT)
Dept: GENERAL RADIOLOGY | Facility: HOSPITAL | Age: 62
End: 2022-04-08
Attending: EMERGENCY MEDICINE
Payer: COMMERCIAL

## 2022-04-08 ENCOUNTER — CARDPULM VISIT (OUTPATIENT)
Dept: CARDIAC REHAB | Facility: HOSPITAL | Age: 62
End: 2022-04-08
Attending: INTERNAL MEDICINE
Payer: COMMERCIAL

## 2022-04-08 ENCOUNTER — HOSPITAL ENCOUNTER (OUTPATIENT)
Facility: HOSPITAL | Age: 62
Setting detail: OBSERVATION
Discharge: HOME | End: 2022-04-11
Attending: EMERGENCY MEDICINE | Admitting: INTERNAL MEDICINE
Payer: COMMERCIAL

## 2022-04-08 DIAGNOSIS — R77.8 ELEVATED TROPONIN: ICD-10-CM

## 2022-04-08 DIAGNOSIS — A41.9 SEPSIS DUE TO URINARY TRACT INFECTION (HCC): Primary | ICD-10-CM

## 2022-04-08 DIAGNOSIS — N39.0 SEPSIS DUE TO URINARY TRACT INFECTION (HCC): Primary | ICD-10-CM

## 2022-04-08 LAB
ALBUMIN SERPL-MCNC: 3 G/DL (ref 3.4–5)
ALBUMIN/GLOB SERPL: 0.7 {RATIO} (ref 1–2)
ALP LIVER SERPL-CCNC: 91 U/L
ALT SERPL-CCNC: 17 U/L
ANION GAP SERPL CALC-SCNC: 9 MMOL/L (ref 0–18)
AST SERPL-CCNC: 9 U/L (ref 15–37)
BASOPHILS # BLD AUTO: 0.02 X10(3) UL (ref 0–0.2)
BASOPHILS NFR BLD AUTO: 0.1 %
BILIRUB SERPL-MCNC: 0.5 MG/DL (ref 0.1–2)
BUN BLD-MCNC: 30 MG/DL (ref 7–18)
CALCIUM BLD-MCNC: 9 MG/DL (ref 8.5–10.1)
CHLORIDE SERPL-SCNC: 105 MMOL/L (ref 98–112)
CO2 SERPL-SCNC: 22 MMOL/L (ref 21–32)
CREAT BLD-MCNC: 0.8 MG/DL
EOSINOPHIL # BLD AUTO: 0.01 X10(3) UL (ref 0–0.7)
EOSINOPHIL NFR BLD AUTO: 0.1 %
ERYTHROCYTE [DISTWIDTH] IN BLOOD BY AUTOMATED COUNT: 15 %
GLOBULIN PLAS-MCNC: 4.5 G/DL (ref 2.8–4.4)
GLUCOSE BLD-MCNC: 220 MG/DL (ref 70–99)
HCT VFR BLD AUTO: 38.1 %
HGB BLD-MCNC: 12.5 G/DL
IMM GRANULOCYTES # BLD AUTO: 0.1 X10(3) UL (ref 0–1)
IMM GRANULOCYTES NFR BLD: 0.7 %
LACTATE SERPL-SCNC: 1.5 MMOL/L (ref 0.4–2)
LIPASE SERPL-CCNC: 60 U/L (ref 73–393)
LYMPHOCYTES # BLD AUTO: 0.97 X10(3) UL (ref 1–4)
LYMPHOCYTES NFR BLD AUTO: 6.8 %
MCH RBC QN AUTO: 28.2 PG (ref 26–34)
MCHC RBC AUTO-ENTMCNC: 32.8 G/DL (ref 31–37)
MCV RBC AUTO: 86 FL
MONOCYTES # BLD AUTO: 1.81 X10(3) UL (ref 0.1–1)
MONOCYTES NFR BLD AUTO: 12.7 %
NEUTROPHILS # BLD AUTO: 11.36 X10 (3) UL (ref 1.5–7.7)
NEUTROPHILS # BLD AUTO: 11.36 X10(3) UL (ref 1.5–7.7)
NEUTROPHILS NFR BLD AUTO: 79.6 %
OSMOLALITY SERPL CALC.SUM OF ELEC: 295 MOSM/KG (ref 275–295)
PLATELET # BLD AUTO: 181 10(3)UL (ref 150–450)
POTASSIUM SERPL-SCNC: 3.7 MMOL/L (ref 3.5–5.1)
PROT SERPL-MCNC: 7.5 G/DL (ref 6.4–8.2)
RBC # BLD AUTO: 4.43 X10(6)UL
SODIUM SERPL-SCNC: 136 MMOL/L (ref 136–145)
WBC # BLD AUTO: 14.3 X10(3) UL (ref 4–11)

## 2022-04-08 PROCEDURE — 71045 X-RAY EXAM CHEST 1 VIEW: CPT | Performed by: EMERGENCY MEDICINE

## 2022-04-08 PROCEDURE — 93798 PHYS/QHP OP CAR RHAB W/ECG: CPT

## 2022-04-08 RX ORDER — ACETAMINOPHEN 500 MG
1000 TABLET ORAL ONCE
Status: COMPLETED | OUTPATIENT
Start: 2022-04-08 | End: 2022-04-08

## 2022-04-09 ENCOUNTER — APPOINTMENT (OUTPATIENT)
Dept: CT IMAGING | Facility: HOSPITAL | Age: 62
End: 2022-04-09
Attending: EMERGENCY MEDICINE
Payer: COMMERCIAL

## 2022-04-09 PROBLEM — N39.0 SEPSIS DUE TO URINARY TRACT INFECTION  (HCC): Status: ACTIVE | Noted: 2022-04-09

## 2022-04-09 PROBLEM — R77.8 ELEVATED TROPONIN: Status: ACTIVE | Noted: 2022-04-09

## 2022-04-09 PROBLEM — N39.0 SEPSIS DUE TO URINARY TRACT INFECTION (HCC): Status: ACTIVE | Noted: 2022-04-09

## 2022-04-09 PROBLEM — A41.9 SEPSIS DUE TO URINARY TRACT INFECTION  (HCC): Status: ACTIVE | Noted: 2022-04-09

## 2022-04-09 PROBLEM — R79.89 ELEVATED TROPONIN: Status: ACTIVE | Noted: 2022-04-09

## 2022-04-09 PROBLEM — A41.9 SEPSIS DUE TO URINARY TRACT INFECTION: Status: ACTIVE | Noted: 2022-04-09

## 2022-04-09 PROBLEM — A41.9 SEPSIS DUE TO URINARY TRACT INFECTION (HCC): Status: ACTIVE | Noted: 2022-04-09

## 2022-04-09 PROBLEM — N39.0 SEPSIS DUE TO URINARY TRACT INFECTION: Status: ACTIVE | Noted: 2022-04-09

## 2022-04-09 LAB
ALBUMIN SERPL-MCNC: 2.7 G/DL (ref 3.4–5)
ALBUMIN/GLOB SERPL: 0.7 {RATIO} (ref 1–2)
ALP LIVER SERPL-CCNC: 77 U/L
ALT SERPL-CCNC: 15 U/L
ANION GAP SERPL CALC-SCNC: 7 MMOL/L (ref 0–18)
AST SERPL-CCNC: 13 U/L (ref 15–37)
ATRIAL RATE: 112 BPM
ATRIAL RATE: 90 BPM
BASOPHILS # BLD AUTO: 0.01 X10(3) UL (ref 0–0.2)
BASOPHILS NFR BLD AUTO: 0.1 %
BILIRUB SERPL-MCNC: 0.4 MG/DL (ref 0.1–2)
BILIRUB UR QL STRIP.AUTO: NEGATIVE
BUN BLD-MCNC: 23 MG/DL (ref 7–18)
CALCIUM BLD-MCNC: 8.4 MG/DL (ref 8.5–10.1)
CHLORIDE SERPL-SCNC: 107 MMOL/L (ref 98–112)
CHOLEST SERPL-MCNC: 156 MG/DL (ref ?–200)
CO2 SERPL-SCNC: 24 MMOL/L (ref 21–32)
COLOR UR AUTO: YELLOW
CREAT BLD-MCNC: 0.87 MG/DL
EOSINOPHIL # BLD AUTO: 0.02 X10(3) UL (ref 0–0.7)
EOSINOPHIL NFR BLD AUTO: 0.1 %
ERYTHROCYTE [DISTWIDTH] IN BLOOD BY AUTOMATED COUNT: 15.1 %
GLOBULIN PLAS-MCNC: 4 G/DL (ref 2.8–4.4)
GLUCOSE BLD-MCNC: 172 MG/DL (ref 70–99)
GLUCOSE BLD-MCNC: 185 MG/DL (ref 70–99)
GLUCOSE BLD-MCNC: 187 MG/DL (ref 70–99)
GLUCOSE BLD-MCNC: 197 MG/DL (ref 70–99)
GLUCOSE BLD-MCNC: 281 MG/DL (ref 70–99)
GLUCOSE BLD-MCNC: 304 MG/DL (ref 70–99)
GLUCOSE UR STRIP.AUTO-MCNC: 50 MG/DL
HCT VFR BLD AUTO: 35.1 %
HDLC SERPL-MCNC: 45 MG/DL (ref 40–59)
HGB BLD-MCNC: 11.4 G/DL
IMM GRANULOCYTES # BLD AUTO: 0.07 X10(3) UL (ref 0–1)
IMM GRANULOCYTES NFR BLD: 0.5 %
KETONES UR STRIP.AUTO-MCNC: NEGATIVE MG/DL
LDLC SERPL CALC-MCNC: 93 MG/DL (ref ?–100)
LYMPHOCYTES # BLD AUTO: 3.48 X10(3) UL (ref 1–4)
LYMPHOCYTES NFR BLD AUTO: 24 %
MCH RBC QN AUTO: 28.1 PG (ref 26–34)
MCHC RBC AUTO-ENTMCNC: 32.5 G/DL (ref 31–37)
MCV RBC AUTO: 86.5 FL
MONOCYTES # BLD AUTO: 2.03 X10(3) UL (ref 0.1–1)
MONOCYTES NFR BLD AUTO: 14 %
NEUTROPHILS # BLD AUTO: 8.92 X10 (3) UL (ref 1.5–7.7)
NEUTROPHILS # BLD AUTO: 8.92 X10(3) UL (ref 1.5–7.7)
NEUTROPHILS NFR BLD AUTO: 61.3 %
NITRITE UR QL STRIP.AUTO: NEGATIVE
NONHDLC SERPL-MCNC: 111 MG/DL (ref ?–130)
OSMOLALITY SERPL CALC.SUM OF ELEC: 300 MOSM/KG (ref 275–295)
P AXIS: 72 DEGREES
P AXIS: 76 DEGREES
P-R INTERVAL: 140 MS
P-R INTERVAL: 152 MS
PH UR STRIP.AUTO: 5 [PH] (ref 5–8)
PLATELET # BLD AUTO: 163 10(3)UL (ref 150–450)
POTASSIUM SERPL-SCNC: 3.7 MMOL/L (ref 3.5–5.1)
PROT SERPL-MCNC: 6.7 G/DL (ref 6.4–8.2)
PROT UR STRIP.AUTO-MCNC: 30 MG/DL
Q-T INTERVAL: 322 MS
Q-T INTERVAL: 392 MS
QRS DURATION: 76 MS
QRS DURATION: 78 MS
QTC CALCULATION (BEZET): 439 MS
QTC CALCULATION (BEZET): 479 MS
R AXIS: 23 DEGREES
R AXIS: 24 DEGREES
RBC # BLD AUTO: 4.06 X10(6)UL
SARS-COV-2 RNA RESP QL NAA+PROBE: NOT DETECTED
SODIUM SERPL-SCNC: 138 MMOL/L (ref 136–145)
SP GR UR STRIP.AUTO: >1.03 (ref 1–1.03)
T AXIS: 117 DEGREES
T AXIS: 87 DEGREES
TRIGL SERPL-MCNC: 94 MG/DL (ref 30–149)
TROPONIN I HIGH SENSITIVITY: 114 NG/L
TROPONIN I HIGH SENSITIVITY: 73 NG/L
UROBILINOGEN UR STRIP.AUTO-MCNC: <2 MG/DL
VENTRICULAR RATE: 112 BPM
VENTRICULAR RATE: 90 BPM
VLDLC SERPL CALC-MCNC: 15 MG/DL (ref 0–30)
WBC # BLD AUTO: 14.5 X10(3) UL (ref 4–11)
WBC #/AREA URNS AUTO: >50 /HPF

## 2022-04-09 PROCEDURE — 99223 1ST HOSP IP/OBS HIGH 75: CPT | Performed by: INTERNAL MEDICINE

## 2022-04-09 PROCEDURE — 74177 CT ABD & PELVIS W/CONTRAST: CPT | Performed by: EMERGENCY MEDICINE

## 2022-04-09 RX ORDER — NICOTINE POLACRILEX 4 MG
15 LOZENGE BUCCAL
Status: DISCONTINUED | OUTPATIENT
Start: 2022-04-09 | End: 2022-04-11

## 2022-04-09 RX ORDER — PROCHLORPERAZINE EDISYLATE 5 MG/ML
5 INJECTION INTRAMUSCULAR; INTRAVENOUS EVERY 8 HOURS PRN
Status: DISCONTINUED | OUTPATIENT
Start: 2022-04-09 | End: 2022-04-11

## 2022-04-09 RX ORDER — ONDANSETRON 2 MG/ML
4 INJECTION INTRAMUSCULAR; INTRAVENOUS ONCE
Status: COMPLETED | OUTPATIENT
Start: 2022-04-09 | End: 2022-04-09

## 2022-04-09 RX ORDER — GABAPENTIN 600 MG/1
600 TABLET ORAL NIGHTLY
COMMUNITY
End: 2022-04-15 | Stop reason: DRUGHIGH

## 2022-04-09 RX ORDER — DEXTROSE MONOHYDRATE 25 G/50ML
50 INJECTION, SOLUTION INTRAVENOUS
Status: DISCONTINUED | OUTPATIENT
Start: 2022-04-09 | End: 2022-04-11

## 2022-04-09 RX ORDER — ONDANSETRON 2 MG/ML
4 INJECTION INTRAMUSCULAR; INTRAVENOUS EVERY 6 HOURS PRN
Status: DISCONTINUED | OUTPATIENT
Start: 2022-04-09 | End: 2022-04-11

## 2022-04-09 RX ORDER — ASPIRIN 81 MG/1
324 TABLET, CHEWABLE ORAL ONCE
Status: COMPLETED | OUTPATIENT
Start: 2022-04-09 | End: 2022-04-09

## 2022-04-09 RX ORDER — SODIUM CHLORIDE 9 MG/ML
INJECTION, SOLUTION INTRAVENOUS CONTINUOUS
Status: DISCONTINUED | OUTPATIENT
Start: 2022-04-09 | End: 2022-04-11

## 2022-04-09 RX ORDER — NICOTINE POLACRILEX 4 MG
30 LOZENGE BUCCAL
Status: DISCONTINUED | OUTPATIENT
Start: 2022-04-09 | End: 2022-04-11

## 2022-04-09 RX ORDER — TRAMADOL HYDROCHLORIDE 50 MG/1
25 TABLET ORAL EVERY 6 HOURS PRN
Status: DISCONTINUED | OUTPATIENT
Start: 2022-04-09 | End: 2022-04-11

## 2022-04-09 RX ORDER — ENOXAPARIN SODIUM 100 MG/ML
1 INJECTION SUBCUTANEOUS EVERY 12 HOURS
Status: DISCONTINUED | OUTPATIENT
Start: 2022-04-09 | End: 2022-04-09

## 2022-04-09 RX ORDER — ENOXAPARIN SODIUM 100 MG/ML
40 INJECTION SUBCUTANEOUS NIGHTLY
Status: DISCONTINUED | OUTPATIENT
Start: 2022-04-10 | End: 2022-04-11

## 2022-04-09 RX ORDER — FAMOTIDINE 10 MG/ML
20 INJECTION, SOLUTION INTRAVENOUS ONCE
Status: COMPLETED | OUTPATIENT
Start: 2022-04-09 | End: 2022-04-09

## 2022-04-09 RX ORDER — MELATONIN
3 NIGHTLY PRN
Status: DISCONTINUED | OUTPATIENT
Start: 2022-04-09 | End: 2022-04-11

## 2022-04-09 RX ORDER — ACETAMINOPHEN 500 MG
1000 TABLET ORAL EVERY 6 HOURS PRN
Status: DISCONTINUED | OUTPATIENT
Start: 2022-04-09 | End: 2022-04-11

## 2022-04-09 RX ORDER — POTASSIUM CHLORIDE 20 MEQ/1
40 TABLET, EXTENDED RELEASE ORAL ONCE
Status: COMPLETED | OUTPATIENT
Start: 2022-04-09 | End: 2022-04-09

## 2022-04-09 RX ORDER — CLOPIDOGREL BISULFATE 75 MG/1
75 TABLET ORAL DAILY
Status: DISCONTINUED | OUTPATIENT
Start: 2022-04-09 | End: 2022-04-11

## 2022-04-09 RX ORDER — ASPIRIN 81 MG/1
81 TABLET ORAL DAILY
Status: DISCONTINUED | OUTPATIENT
Start: 2022-04-09 | End: 2022-04-11

## 2022-04-09 RX ORDER — GABAPENTIN 300 MG/1
300 CAPSULE ORAL 3 TIMES DAILY
Status: DISCONTINUED | OUTPATIENT
Start: 2022-04-09 | End: 2022-04-11

## 2022-04-09 RX ORDER — TRAMADOL HYDROCHLORIDE 50 MG/1
25 TABLET ORAL 2 TIMES DAILY
COMMUNITY

## 2022-04-09 RX ORDER — IOHEXOL 350 MG/ML
100 INJECTION, SOLUTION INTRAVENOUS
Status: COMPLETED | OUTPATIENT
Start: 2022-04-09 | End: 2022-04-09

## 2022-04-09 NOTE — ED INITIAL ASSESSMENT (HPI)
Pt here with chills and dizziness, also c/o weakness and R upper abd pain. 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

## 2022-04-09 NOTE — ED QUICK NOTES
Orders for admission, patient is aware of plan and ready to go upstairs.  Any questions, please call ED ELLEN Baez at extension [phone]    Patient Covid vaccination status: Fully vaccinated     COVID Test Ordered in ED: Rapid SARS-CoV-2 by PCR    COVID Suspicion at Admission: Low clinical suspicion for COVID    Running Infusions:      Mental Status/LOC at time of transport: A&Ox4    Other pertinent information:   CIWA score: N/A   NIH score:  N/A

## 2022-04-09 NOTE — ED QUICK NOTES
Patient c/o bilateral jaw pain and indigestion, reports similar sx when had MD SAHRA notified, awaiting further orders at this time.

## 2022-04-09 NOTE — PLAN OF CARE
Patient is alert and oriented times four. Lungs clear on auscultation. Pt. Is sinus rhythm on monitor. She is up in room with standby assistance. Chest and left leg incisions appear to be healed. Patient is c/o left leg pain which she says has been present for some time. Left pedal pulse is strong and regular.

## 2022-04-09 NOTE — PLAN OF CARE
Received patient from ED into 8625 just before 0500. Oriented to room, call light and unit policies. AO x4. NSR on tele monitor. O2 sat adequate on room air. Denies chest pain and shortness of breath. Navigator complete and MD paged. Bed locked, in lowest position. Call light and personal items in reach. Will continue to monitor.     Problem: Diabetes/Glucose Control  Goal: Glucose maintained within prescribed range  Description: INTERVENTIONS:  - Monitor Blood Glucose as ordered  - Assess for signs and symptoms of hyperglycemia and hypoglycemia  - Administer ordered medications to maintain glucose within target range  - Assess barriers to adequate nutritional intake and initiate nutrition consult as needed  - Instruct patient on self management of diabetes  Outcome: Progressing     Problem: Patient/Family Goals  Goal: Patient/Family Long Term Goal  Description: Patient's Long Term Goal: stay out of the hospital    Interventions:  - take meds as prescribed  - attend follow up appointments  - manage diabetes  - See additional Care Plan goals for specific interventions  Outcome: Progressing  Goal: Patient/Family Short Term Goal  Description: Patient's Short Term Goal: feel better    Interventions:   - IV fluids and antibiotics  - trend troponin  - appropriate consults  - See additional Care Plan goals for specific interventions  Outcome: Progressing

## 2022-04-09 NOTE — PLAN OF CARE
Dr. Harris Delarosa notified that the night nurse gave the patient her long acting insulin (usually given at HS). Also, patient refused short acting insulin despite glucoscan of 304. She stated that the amount of insulin ordered would drop her blood sugar to the 60's.

## 2022-04-09 NOTE — PLAN OF CARE
Problem: Diabetes/Glucose Control  Goal: Glucose maintained within prescribed range  Description: INTERVENTIONS:  - Monitor Blood Glucose as ordered  - Assess for signs and symptoms of hyperglycemia and hypoglycemia  - Administer ordered medications to maintain glucose within target range  - Assess barriers to adequate nutritional intake and initiate nutrition consult as needed  - Instruct patient on self management of diabetes  Outcome: Progressing     Problem: Patient/Family Goals  Goal: Patient/Family Long Term Goal  Description: Patient's Long Term Goal: stay out of the hospital    Interventions:  - take meds as prescribed  - attend follow up appointments  - manage diabetes  - See additional Care Plan goals for specific interventions  Outcome: Progressing  Goal: Patient/Family Short Term Goal  Description: Patient's Short Term Goal: feel better    Interventions:   - IV fluids and antibiotics  - trend troponin  - appropriate consults  - See additional Care Plan goals for specific interventions  Outcome: Progressing

## 2022-04-10 ENCOUNTER — APPOINTMENT (OUTPATIENT)
Dept: ULTRASOUND IMAGING | Facility: HOSPITAL | Age: 62
End: 2022-04-10
Attending: HOSPITALIST
Payer: COMMERCIAL

## 2022-04-10 LAB
BASOPHILS # BLD AUTO: 0 X10(3) UL (ref 0–0.2)
BASOPHILS NFR BLD AUTO: 0 %
EOSINOPHIL # BLD AUTO: 0.04 X10(3) UL (ref 0–0.7)
EOSINOPHIL NFR BLD AUTO: 0.4 %
ERYTHROCYTE [DISTWIDTH] IN BLOOD BY AUTOMATED COUNT: 14.8 %
GLUCOSE BLD-MCNC: 117 MG/DL (ref 70–99)
GLUCOSE BLD-MCNC: 146 MG/DL (ref 70–99)
GLUCOSE BLD-MCNC: 164 MG/DL (ref 70–99)
GLUCOSE BLD-MCNC: 186 MG/DL (ref 70–99)
HCT VFR BLD AUTO: 34.6 %
HGB BLD-MCNC: 10.7 G/DL
IMM GRANULOCYTES # BLD AUTO: 0.04 X10(3) UL (ref 0–1)
IMM GRANULOCYTES NFR BLD: 0.4 %
LYMPHOCYTES # BLD AUTO: 2.15 X10(3) UL (ref 1–4)
LYMPHOCYTES NFR BLD AUTO: 21.7 %
MCH RBC QN AUTO: 27.6 PG (ref 26–34)
MCHC RBC AUTO-ENTMCNC: 30.9 G/DL (ref 31–37)
MCV RBC AUTO: 89.4 FL
MONOCYTES # BLD AUTO: 1.45 X10(3) UL (ref 0.1–1)
MONOCYTES NFR BLD AUTO: 14.6 %
NEUTROPHILS # BLD AUTO: 6.23 X10 (3) UL (ref 1.5–7.7)
NEUTROPHILS # BLD AUTO: 6.23 X10(3) UL (ref 1.5–7.7)
NEUTROPHILS NFR BLD AUTO: 62.9 %
PLATELET # BLD AUTO: 174 10(3)UL (ref 150–450)
POTASSIUM SERPL-SCNC: 3.7 MMOL/L (ref 3.5–5.1)
RBC # BLD AUTO: 3.87 X10(6)UL
WBC # BLD AUTO: 9.9 X10(3) UL (ref 4–11)

## 2022-04-10 PROCEDURE — 93971 EXTREMITY STUDY: CPT | Performed by: HOSPITALIST

## 2022-04-10 PROCEDURE — 99232 SBSQ HOSP IP/OBS MODERATE 35: CPT | Performed by: HOSPITALIST

## 2022-04-10 RX ORDER — TRAMADOL HYDROCHLORIDE 50 MG/1
25 TABLET ORAL 2 TIMES DAILY
Status: DISCONTINUED | OUTPATIENT
Start: 2022-04-10 | End: 2022-04-11

## 2022-04-10 RX ORDER — LOSARTAN POTASSIUM 25 MG/1
25 TABLET ORAL DAILY
Status: DISCONTINUED | OUTPATIENT
Start: 2022-04-10 | End: 2022-04-11

## 2022-04-10 RX ORDER — POTASSIUM CHLORIDE 20 MEQ/1
40 TABLET, EXTENDED RELEASE ORAL ONCE
Status: COMPLETED | OUTPATIENT
Start: 2022-04-10 | End: 2022-04-10

## 2022-04-10 NOTE — PLAN OF CARE
Received patient at 1. Patient A/O x 4. NSR on tele. O2 saturation 98% on RA. No C/O chest pain or SOB. No fevers this evening. Patient voiding with no issue. Independent. States she feels more better and stronger this evening. Bed is locked and in low position. Call light and personal items within reach. Family member to stay at bedside. POC: IVF and antibiotics    Lab called this evening for critical of blood cultures being positive.  MD notified, ordered for blood cultures in the am. All needs met at this time      Problem: Patient/Family Goals  Goal: Patient/Family Long Term Goal  Description: Patient's Long Term Goal: stay out of the hospital    Interventions:  - take meds as prescribed  - attend follow up appointments  - manage diabetes  - See additional Care Plan goals for specific interventions  Outcome: Progressing  Goal: Patient/Family Short Term Goal  Description: Patient's Short Term Goal: feel better    Interventions:   - IV fluids and antibiotics  - trend troponin  - appropriate consults  - See additional Care Plan goals for specific interventions  Outcome: Progressing     Problem: Diabetes/Glucose Control  Goal: Glucose maintained within prescribed range  Description: INTERVENTIONS:  - Monitor Blood Glucose as ordered  - Assess for signs and symptoms of hyperglycemia and hypoglycemia  - Administer ordered medications to maintain glucose within target range  - Assess barriers to adequate nutritional intake and initiate nutrition consult as needed  - Instruct patient on self management of diabetes  Outcome: Progressing

## 2022-04-10 NOTE — PLAN OF CARE
Assumed pt care at 0730. A&Ox4. Room air, denies pain/SOB, lung sounds clear, vitals stable, NSR on tele. Voids. Up ad ellyn. Plan for doppler US of lower extremities, blood cultures, continue fluids, IV abx daily. POC updated with pt and family, questions answered, verbalized understanding. Will continue to monitor.     Problem: Diabetes/Glucose Control  Goal: Glucose maintained within prescribed range  Description: INTERVENTIONS:  - Monitor Blood Glucose as ordered  - Assess for signs and symptoms of hyperglycemia and hypoglycemia  - Administer ordered medications to maintain glucose within target range  - Assess barriers to adequate nutritional intake and initiate nutrition consult as needed  - Instruct patient on self management of diabetes  Outcome: Progressing     Problem: Patient/Family Goals  Goal: Patient/Family Long Term Goal  Description: Patient's Long Term Goal: stay out of the hospital    Interventions:  - take meds as prescribed  - attend follow up appointments  - manage diabetes  - See additional Care Plan goals for specific interventions  Outcome: Progressing  Goal: Patient/Family Short Term Goal  Description: Patient's Short Term Goal: feel better    Interventions:   - IV fluids and antibiotics  - trend troponin  - appropriate consults  - See additional Care Plan goals for specific interventions  Outcome: Progressing

## 2022-04-11 ENCOUNTER — APPOINTMENT (OUTPATIENT)
Dept: CARDIAC REHAB | Facility: HOSPITAL | Age: 62
End: 2022-04-11
Attending: INTERNAL MEDICINE
Payer: COMMERCIAL

## 2022-04-11 ENCOUNTER — OFFICE VISIT (OUTPATIENT)
Dept: PAIN CLINIC | Facility: CLINIC | Age: 62
End: 2022-04-11
Payer: COMMERCIAL

## 2022-04-11 ENCOUNTER — TELEPHONE (OUTPATIENT)
Dept: ENDOCRINOLOGY CLINIC | Facility: CLINIC | Age: 62
End: 2022-04-11

## 2022-04-11 VITALS — SYSTOLIC BLOOD PRESSURE: 120 MMHG | HEART RATE: 78 BPM | DIASTOLIC BLOOD PRESSURE: 60 MMHG | OXYGEN SATURATION: 98 %

## 2022-04-11 VITALS
TEMPERATURE: 99 F | SYSTOLIC BLOOD PRESSURE: 154 MMHG | HEART RATE: 87 BPM | DIASTOLIC BLOOD PRESSURE: 68 MMHG | RESPIRATION RATE: 18 BRPM | OXYGEN SATURATION: 98 % | HEIGHT: 64 IN | WEIGHT: 168.19 LBS | BODY MASS INDEX: 28.71 KG/M2

## 2022-04-11 DIAGNOSIS — Z95.1 STATUS POST CORONARY ARTERY BYPASS GRAFT: Primary | ICD-10-CM

## 2022-04-11 LAB
BASOPHILS # BLD AUTO: 0.02 X10(3) UL (ref 0–0.2)
BASOPHILS NFR BLD AUTO: 0.2 %
EOSINOPHIL # BLD AUTO: 0.1 X10(3) UL (ref 0–0.7)
EOSINOPHIL NFR BLD AUTO: 1.2 %
ERYTHROCYTE [DISTWIDTH] IN BLOOD BY AUTOMATED COUNT: 14.6 %
GLUCOSE BLD-MCNC: 147 MG/DL (ref 70–99)
GLUCOSE BLD-MCNC: 191 MG/DL (ref 70–99)
GLUCOSE BLD-MCNC: 88 MG/DL (ref 70–99)
HCT VFR BLD AUTO: 36.9 %
HGB BLD-MCNC: 11.2 G/DL
IMM GRANULOCYTES # BLD AUTO: 0.02 X10(3) UL (ref 0–1)
IMM GRANULOCYTES NFR BLD: 0.2 %
LYMPHOCYTES # BLD AUTO: 2.97 X10(3) UL (ref 1–4)
LYMPHOCYTES NFR BLD AUTO: 37 %
MCH RBC QN AUTO: 27.7 PG (ref 26–34)
MCHC RBC AUTO-ENTMCNC: 30.4 G/DL (ref 31–37)
MCV RBC AUTO: 91.1 FL
MONOCYTES # BLD AUTO: 1.18 X10(3) UL (ref 0.1–1)
MONOCYTES NFR BLD AUTO: 14.7 %
NEUTROPHILS # BLD AUTO: 3.73 X10 (3) UL (ref 1.5–7.7)
NEUTROPHILS # BLD AUTO: 3.73 X10(3) UL (ref 1.5–7.7)
NEUTROPHILS NFR BLD AUTO: 46.7 %
PLATELET # BLD AUTO: 191 10(3)UL (ref 150–450)
POTASSIUM SERPL-SCNC: 3.7 MMOL/L (ref 3.5–5.1)
RBC # BLD AUTO: 4.05 X10(6)UL
WBC # BLD AUTO: 8 X10(3) UL (ref 4–11)

## 2022-04-11 PROCEDURE — 99239 HOSP IP/OBS DSCHRG MGMT >30: CPT | Performed by: HOSPITALIST

## 2022-04-11 PROCEDURE — 3074F SYST BP LT 130 MM HG: CPT | Performed by: ANESTHESIOLOGY

## 2022-04-11 PROCEDURE — 3078F DIAST BP <80 MM HG: CPT | Performed by: ANESTHESIOLOGY

## 2022-04-11 PROCEDURE — 99214 OFFICE O/P EST MOD 30 MIN: CPT | Performed by: ANESTHESIOLOGY

## 2022-04-11 RX ORDER — POTASSIUM CHLORIDE 20 MEQ/1
40 TABLET, EXTENDED RELEASE ORAL ONCE
Status: COMPLETED | OUTPATIENT
Start: 2022-04-11 | End: 2022-04-11

## 2022-04-11 RX ORDER — CEFDINIR 300 MG/1
300 CAPSULE ORAL 2 TIMES DAILY
Qty: 22 CAPSULE | Refills: 0 | Status: SHIPPED | OUTPATIENT
Start: 2022-04-11 | End: 2022-04-22

## 2022-04-11 RX ORDER — POTASSIUM CHLORIDE 20 MEQ/1
20 TABLET, EXTENDED RELEASE ORAL ONCE
Status: DISCONTINUED | OUTPATIENT
Start: 2022-04-11 | End: 2022-04-11

## 2022-04-11 NOTE — TELEPHONE ENCOUNTER
Pt wanted to inform you that she was admitted into the hospital with uti and spetis . She feels she needs different meds. Please call.

## 2022-04-11 NOTE — TELEPHONE ENCOUNTER
Pt admitted with UTI and sepsis. She is still admitted but thinks she'll be discharged today. Informed her we can't change med orders while she is admitted. She will call tomorrow am to confirm that she has been discharged. Pt states her daughter tells her she can control her BG with herbal medications.

## 2022-04-11 NOTE — PLAN OF CARE
2350   Temp 99.3 , medicated with tylenol. Alert and o x 4 , not in any form of distress. Denies any pain or discomfort at this time. Tele shows NSR on the monitor. Instructed to call staff when needed help , placed call light w/in reach. Cont. Monitor per tele/labs/v/s. Problem: Diabetes/Glucose Control  Goal: Glucose maintained within prescribed range  Description: INTERVENTIONS:  - Monitor Blood Glucose as ordered  - Assess for signs and symptoms of hyperglycemia and hypoglycemia  - Administer ordered medications to maintain glucose within target range  - Assess barriers to adequate nutritional intake and initiate nutrition consult as needed  - Instruct patient on self management of diabetes  4/11/2022 0025 by Az Villafana RN  Outcome: Progressing  4/11/2022 0024 by Az Villafana RN  Outcome: Progressing     Problem: CARDIOVASCULAR - ADULT  Goal: Maintains optimal cardiac output and hemodynamic stability  Description: INTERVENTIONS:  - Monitor vital signs, rhythm, and trends  - Monitor for bleeding, hypotension and signs of decreased cardiac output  - Evaluate effectiveness of vasoactive medications to optimize hemodynamic stability  - Monitor arterial and/or venous puncture sites for bleeding and/or hematoma  - Assess quality of pulses, skin color and temperature  - Assess for signs of decreased coronary artery perfusion - ex.  Angina  - Evaluate fluid balance, assess for edema, trend weights  4/11/2022 0025 by Az Villafana RN  Outcome: Progressing  4/11/2022 0024 by Az Villafana RN  Outcome: Progressing  Goal: Absence of cardiac arrhythmias or at baseline  Description: INTERVENTIONS:  - Continuous cardiac monitoring, monitor vital signs, obtain 12 lead EKG if indicated  - Evaluate effectiveness of antiarrhythmic and heart rate control medications as ordered  - Initiate emergency measures for life threatening arrhythmias  - Monitor electrolytes and administer replacement therapy as ordered  4/11/2022 0025 by Lavinia Salvador RN  Outcome: Progressing  4/11/2022 0024 by Lavinia Salvador RN  Outcome: Progressing     Problem: PAIN - ADULT  Goal: Verbalizes/displays adequate comfort level or patient's stated pain goal  Description: INTERVENTIONS:  - Encourage pt to monitor pain and request assistance  - Assess pain using appropriate pain scale  - Administer analgesics based on type and severity of pain and evaluate response  - Implement non-pharmacological measures as appropriate and evaluate response  - Consider cultural and social influences on pain and pain management  - Manage/alleviate anxiety  - Utilize distraction and/or relaxation techniques  - Monitor for opioid side effects  - Notify MD/LIP if interventions unsuccessful or patient reports new pain  - Anticipate increased pain with activity and pre-medicate as appropriate  Outcome: Progressing

## 2022-04-11 NOTE — PLAN OF CARE
Assumed care of patient 1930 resting in bed, with family at bedside. AO x4. NSR on tele monitor. O2 sat adequate on room air. Denies chest pain and shortness of breath. Plan of care updated. Bed locked, in lowest position. Call light and personal items in reach. Will continue to monitor.

## 2022-04-11 NOTE — PROGRESS NOTES
Patient presents in office today with reported pain in left thigh     Current pain level reported = 3/10    Last reported dose of tramadol this am      Narcotic Contract renewal na    Urine Drug screen na

## 2022-04-12 ENCOUNTER — PATIENT OUTREACH (OUTPATIENT)
Dept: CASE MANAGEMENT | Age: 62
End: 2022-04-12

## 2022-04-12 NOTE — PLAN OF CARE
Pt discharged home via wheelchair accompanied by . Discharge instructions given with pt verbalizing understanding.

## 2022-04-12 NOTE — PROGRESS NOTES
1st attempt DM apt request (dc 04/11)    MA[facility] Bowles  Diabetes Management  [address] [facility]  [id number]  [address]  [phone]  [facility] to call [phone] to assist w/scheduling apt; will try again

## 2022-04-13 ENCOUNTER — APPOINTMENT (OUTPATIENT)
Dept: CARDIAC REHAB | Facility: HOSPITAL | Age: 62
End: 2022-04-13
Attending: INTERNAL MEDICINE
Payer: COMMERCIAL

## 2022-04-13 ENCOUNTER — OFFICE VISIT (OUTPATIENT)
Dept: ENDOCRINOLOGY CLINIC | Facility: CLINIC | Age: 62
End: 2022-04-13
Payer: COMMERCIAL

## 2022-04-13 VITALS
DIASTOLIC BLOOD PRESSURE: 70 MMHG | BODY MASS INDEX: 27.83 KG/M2 | HEART RATE: 80 BPM | TEMPERATURE: 98 F | OXYGEN SATURATION: 99 % | HEIGHT: 64 IN | RESPIRATION RATE: 16 BRPM | WEIGHT: 163 LBS | SYSTOLIC BLOOD PRESSURE: 120 MMHG

## 2022-04-13 DIAGNOSIS — Z79.4 TYPE 2 DIABETES MELLITUS WITHOUT COMPLICATION, WITH LONG-TERM CURRENT USE OF INSULIN (HCC): Primary | ICD-10-CM

## 2022-04-13 DIAGNOSIS — E11.9 TYPE 2 DIABETES MELLITUS WITHOUT COMPLICATION, WITH LONG-TERM CURRENT USE OF INSULIN (HCC): Primary | ICD-10-CM

## 2022-04-13 LAB
CREAT UR-SCNC: 70.6 MG/DL
MICROALBUMIN UR-MCNC: 12.1 MG/DL
MICROALBUMIN/CREAT 24H UR-RTO: 171.4 UG/MG (ref ?–30)

## 2022-04-13 PROCEDURE — 3008F BODY MASS INDEX DOCD: CPT | Performed by: NURSE PRACTITIONER

## 2022-04-13 PROCEDURE — 3074F SYST BP LT 130 MM HG: CPT | Performed by: NURSE PRACTITIONER

## 2022-04-13 PROCEDURE — 3078F DIAST BP <80 MM HG: CPT | Performed by: NURSE PRACTITIONER

## 2022-04-13 PROCEDURE — 82043 UR ALBUMIN QUANTITATIVE: CPT | Performed by: NURSE PRACTITIONER

## 2022-04-13 PROCEDURE — 99214 OFFICE O/P EST MOD 30 MIN: CPT | Performed by: NURSE PRACTITIONER

## 2022-04-13 PROCEDURE — 82570 ASSAY OF URINE CREATININE: CPT | Performed by: NURSE PRACTITIONER

## 2022-04-13 RX ORDER — GLIPIZIDE 10 MG/1
10 TABLET, FILM COATED, EXTENDED RELEASE ORAL 2 TIMES DAILY
Qty: 60 TABLET | Refills: 0 | Status: SHIPPED | OUTPATIENT
Start: 2022-04-13

## 2022-04-13 NOTE — PROGRESS NOTES
2nd attempt DM apt request (dc 04/11)  [address], APN  Diabetes Management  [facility]  [id number]  [place], [address]  [phone]  [facility] w/office to call pt to schedule apt  Confirmed w/pt  Closing encounter

## 2022-04-14 ENCOUNTER — TELEPHONE (OUTPATIENT)
Dept: ENDOCRINOLOGY CLINIC | Facility: CLINIC | Age: 62
End: 2022-04-14

## 2022-04-14 NOTE — TELEPHONE ENCOUNTER
LOV 4/13/22    Future Appointments   Date Time Provider Remy Price   4/15/2022  9:45 AM [facility] CARDIAC REHAB ROOM 1 [address]   4/18/2022  9:45 AM [facility] CARDIAC REHAB ROOM 1 [address]   4/20/2022  9:45 AM [facility] CARDIAC REHAB ROOM 1 [address]   4/22/2022  9:45 AM [facility] CARDIAC REHAB ROOM 1 [address]   4/25/2022  9:45 AM [facility] CARDIAC REHAB ROOM 1 [address]   4/27/2022  9:45 AM [facility] CARDIAC REHAB ROOM 1 [address]   4/29/2022  9:45 AM [facility] CARDIAC REHAB ROOM 1 [address]   5/2/2022  9:45 AM [facility] CARDIAC REHAB ROOM 1 [address]   5/16/2022 11:00 AM Neo Keith MD [facility] [place]   5/18/2022 11:30 AM CEASAR Ha [facility]     Gabapentin ordered by another provider but I was unable to delete it

## 2022-04-15 ENCOUNTER — TELEPHONE (OUTPATIENT)
Dept: ENDOCRINOLOGY CLINIC | Facility: CLINIC | Age: 62
End: 2022-04-15

## 2022-04-15 ENCOUNTER — CARDPULM VISIT (OUTPATIENT)
Dept: CARDIAC REHAB | Facility: HOSPITAL | Age: 62
End: 2022-04-15
Attending: INTERNAL MEDICINE
Payer: COMMERCIAL

## 2022-04-15 LAB — E COLI DNA BLD POS QL NAA+NON-PROBE: DETECTED

## 2022-04-15 RX ORDER — GLIPIZIDE 10 MG/1
10 TABLET ORAL
Qty: 60 TABLET | Refills: 0 | OUTPATIENT
Start: 2022-04-15

## 2022-04-15 RX ORDER — GABAPENTIN 300 MG/1
600 CAPSULE ORAL 3 TIMES DAILY
Qty: 360 CAPSULE | Refills: 2 | Status: SHIPPED | OUTPATIENT
Start: 2022-04-15

## 2022-04-15 NOTE — PAYOR COMM NOTE
--------------  DISCHARGE REVIEW    Payor: UMANG HENRANDEZ  Subscriber #:  [id number]  Authorization Number: [id number]    Admit date: 4/9/22  Admit time:   4:43 AM  Discharge Date: 4/11/2022  7:51 PM     Admitting Physician: Marry Schaefer DO  Attending Physician:  No att. providers found  Primary Care Physician: Randi Flores MD       Discharge Summary Notes    No notes of this type exist for this encounter.          REVIEWER COMMENTS

## 2022-04-15 NOTE — TELEPHONE ENCOUNTER
Not likely glipizide although an uncommon side effect is pruritis, Call PCP to check on vaginitis.  If continues we can discuss changing to prandial insulin

## 2022-04-15 NOTE — TELEPHONE ENCOUNTER
Pt. LM to return call. Pt. contacted & reports that she is experiencing vaginal itching like she experienced on Jardiance. I informed pt. that I would upload her Dexcom report for APN to review. Please advise.        CGM Analysis of data: dates  4/9/22-4/15/22  Sensor download: full report in media  Average glucose : 175 mg/dLmg/dl   SD: 49 mg/dL  Coefficient of Variation:27.9%  45.3% time above 180mg /dl   5.6% time above 250 mg/dl  54.7% time in target range:  mg/dl   0% time below target range: 70mg/dl     Current medications:  Tresiba 35 units daily  Glipizide ER 10 mg 1 tab twice daily

## 2022-04-18 ENCOUNTER — CARDPULM VISIT (OUTPATIENT)
Dept: CARDIAC REHAB | Facility: HOSPITAL | Age: 62
End: 2022-04-18
Attending: INTERNAL MEDICINE
Payer: COMMERCIAL

## 2022-04-20 ENCOUNTER — CARDPULM VISIT (OUTPATIENT)
Dept: CARDIAC REHAB | Facility: HOSPITAL | Age: 62
End: 2022-04-20
Attending: INTERNAL MEDICINE
Payer: COMMERCIAL

## 2022-04-22 ENCOUNTER — CARDPULM VISIT (OUTPATIENT)
Dept: CARDIAC REHAB | Facility: HOSPITAL | Age: 62
End: 2022-04-22
Attending: INTERNAL MEDICINE
Payer: COMMERCIAL

## 2022-04-25 ENCOUNTER — APPOINTMENT (OUTPATIENT)
Dept: CARDIAC REHAB | Facility: HOSPITAL | Age: 62
End: 2022-04-25
Attending: INTERNAL MEDICINE
Payer: COMMERCIAL

## 2022-04-27 ENCOUNTER — APPOINTMENT (OUTPATIENT)
Dept: CARDIAC REHAB | Facility: HOSPITAL | Age: 62
End: 2022-04-27
Attending: INTERNAL MEDICINE
Payer: COMMERCIAL

## 2022-04-28 RX ORDER — INSULIN DEGLUDEC INJECTION 100 U/ML
35 INJECTION, SOLUTION SUBCUTANEOUS DAILY
Qty: 15 ML | Refills: 2 | Status: SHIPPED | OUTPATIENT
Start: 2022-04-28

## 2022-04-28 NOTE — TELEPHONE ENCOUNTER
Pt needs refill tresiba 35 units every night Pt called in to  to pharmacy could not release  was a discrepancy   in order, if you could resend .

## 2022-04-29 ENCOUNTER — APPOINTMENT (OUTPATIENT)
Dept: CARDIAC REHAB | Facility: HOSPITAL | Age: 62
End: 2022-04-29
Attending: INTERNAL MEDICINE
Payer: COMMERCIAL

## 2022-05-02 ENCOUNTER — APPOINTMENT (OUTPATIENT)
Dept: CARDIAC REHAB | Facility: HOSPITAL | Age: 62
End: 2022-05-02
Attending: INTERNAL MEDICINE
Payer: COMMERCIAL

## 2022-05-16 ENCOUNTER — OFFICE VISIT (OUTPATIENT)
Dept: PAIN CLINIC | Facility: CLINIC | Age: 62
End: 2022-05-16
Payer: COMMERCIAL

## 2022-05-16 ENCOUNTER — CARDPULM VISIT (OUTPATIENT)
Dept: CARDIAC REHAB | Facility: HOSPITAL | Age: 62
End: 2022-05-16
Attending: INTERNAL MEDICINE
Payer: COMMERCIAL

## 2022-05-16 VITALS
WEIGHT: 168 LBS | DIASTOLIC BLOOD PRESSURE: 60 MMHG | SYSTOLIC BLOOD PRESSURE: 112 MMHG | BODY MASS INDEX: 29 KG/M2 | HEART RATE: 81 BPM | RESPIRATION RATE: 16 BRPM

## 2022-05-16 DIAGNOSIS — I25.10 CORONARY ARTERY DISEASE INVOLVING NATIVE CORONARY ARTERY OF NATIVE HEART, UNSPECIFIED WHETHER ANGINA PRESENT: Primary | ICD-10-CM

## 2022-05-16 PROCEDURE — 3078F DIAST BP <80 MM HG: CPT | Performed by: ANESTHESIOLOGY

## 2022-05-16 PROCEDURE — 99214 OFFICE O/P EST MOD 30 MIN: CPT | Performed by: ANESTHESIOLOGY

## 2022-05-16 PROCEDURE — 3074F SYST BP LT 130 MM HG: CPT | Performed by: ANESTHESIOLOGY

## 2022-05-16 RX ORDER — ISOSORBIDE MONONITRATE 30 MG/1
TABLET, EXTENDED RELEASE ORAL
COMMUNITY
Start: 2022-05-13

## 2022-05-16 RX ORDER — PANTOPRAZOLE SODIUM 40 MG/1
TABLET, DELAYED RELEASE ORAL
COMMUNITY
Start: 2022-05-13

## 2022-05-16 NOTE — PROGRESS NOTES
Patient presents in office today with reported pain in left leg    Current pain level reported = 2/10    Last reported dose of 5 days- tramadol      Narcotic Contract renewal     Urine Drug screen

## 2022-05-18 ENCOUNTER — CARDPULM VISIT (OUTPATIENT)
Dept: CARDIAC REHAB | Facility: HOSPITAL | Age: 62
End: 2022-05-18
Attending: INTERNAL MEDICINE
Payer: COMMERCIAL

## 2022-05-18 DIAGNOSIS — Z79.4 TYPE 2 DIABETES MELLITUS WITHOUT COMPLICATION, WITH LONG-TERM CURRENT USE OF INSULIN (HCC): ICD-10-CM

## 2022-05-18 DIAGNOSIS — E11.9 TYPE 2 DIABETES MELLITUS WITHOUT COMPLICATION, WITH LONG-TERM CURRENT USE OF INSULIN (HCC): ICD-10-CM

## 2022-05-18 PROCEDURE — 93798 PHYS/QHP OP CAR RHAB W/ECG: CPT

## 2022-05-18 RX ORDER — GLIPIZIDE 10 MG/1
TABLET, FILM COATED, EXTENDED RELEASE ORAL
Qty: 60 TABLET | Refills: 0 | Status: SHIPPED | OUTPATIENT
Start: 2022-05-18

## 2022-05-20 ENCOUNTER — CARDPULM VISIT (OUTPATIENT)
Dept: CARDIAC REHAB | Facility: HOSPITAL | Age: 62
End: 2022-05-20
Attending: INTERNAL MEDICINE
Payer: COMMERCIAL

## 2022-05-20 PROCEDURE — 93798 PHYS/QHP OP CAR RHAB W/ECG: CPT

## 2022-05-23 ENCOUNTER — CARDPULM VISIT (OUTPATIENT)
Dept: CARDIAC REHAB | Facility: HOSPITAL | Age: 62
End: 2022-05-23
Payer: COMMERCIAL

## 2022-05-23 PROCEDURE — 93798 PHYS/QHP OP CAR RHAB W/ECG: CPT

## 2022-05-24 ENCOUNTER — NURSE ONLY (OUTPATIENT)
Dept: ENDOCRINOLOGY CLINIC | Facility: CLINIC | Age: 62
End: 2022-05-24
Payer: COMMERCIAL

## 2022-05-24 VITALS — BODY MASS INDEX: 29 KG/M2 | WEIGHT: 166.63 LBS

## 2022-05-24 DIAGNOSIS — E11.9 TYPE 2 DIABETES MELLITUS WITHOUT COMPLICATION, WITH LONG-TERM CURRENT USE OF INSULIN (HCC): ICD-10-CM

## 2022-05-24 DIAGNOSIS — Z79.4 TYPE 2 DIABETES MELLITUS WITHOUT COMPLICATION, WITH LONG-TERM CURRENT USE OF INSULIN (HCC): ICD-10-CM

## 2022-05-24 PROCEDURE — G0108 DIAB MANAGE TRN  PER INDIV: HCPCS | Performed by: DIETITIAN, REGISTERED

## 2022-05-24 NOTE — PROGRESS NOTES
Shayla Farrell  [id number] was seen for Continuous Glucose Sensor Follow-up Visit:    Date: 5/24/2022  Referring Provider: Suzanna Singh  Start time: 10:30am End time:11am    Sensor Type: Dexcom G6    Site Assessment: sensor in place    Reviewed CGMS download and patient logs:    CGM Analysis of data: dates 5/11/22-5/24/22  Sensor download: full report in media  Average glucose : 219  mg/dl     SD:59%  COEFFICIENT OF VARIATION: 26.8%    73.5% time above 180mg /dl   29.1% time above 250 mg/dl  26.4% time in target range:  mg/dl   0.1% time below target range: 70mg/dl     Food/activity diary findings:   Pt. Eats 3 meals/day  B: 9am fried eggs 1 sl toast (wheat or raisin)  1 pkg oatmeal1-2 turkey or pork sausage links or patties or 1 waffle w/butter & syrup, grits or wh. rice   L: 11:30-1pm Eats out 3 x/wk @ [employer] for fish sandwich or 1/4 pounder w/emiliano & Coke or if eating at home will eat a kleibsa & 1 slice bread or chx salad on a brioche bun   D: steak 1x/month,grd turkey burgers or spaghetti or chx legs w/BBQ sauce & potaotes & pasta 1x/wk creamed corn, black-eyed peas  Or 1x/month pizza  Snacks on salty chips, pretzels, popcorn or veggie crackers not sweets  Exercise: has been going to cardiac rehab 3 x/wk & to [facility] on 2 other days  Current diabetes medications:   Tresiba 30-35 units daily  Glipizide ER 10 mg daily with breakfast & dinner      Recommended self-management changes: try to choose one meal/day that she makes healthier low-carb, low-fat choices;for ex. Not going to cardiac Rehab after tomorrow. Decrease number of times eating out for lunch    Recommended oral medication/insulin changes: Will most likely need to add mealtime insulin back to medications. Did discuss trying Metformin ER if was taking plain in the past.       Plan: CGMS download- color copy sent to scan. Download forwarded to provider.   Follow-up with APN tomorrow as scheduled      Patient verbalized understanding and has no further questions at this time.     Tri Adair RN, CDE

## 2022-05-25 ENCOUNTER — TELEPHONE (OUTPATIENT)
Dept: ENDOCRINOLOGY CLINIC | Facility: CLINIC | Age: 62
End: 2022-05-25

## 2022-05-25 ENCOUNTER — CARDPULM VISIT (OUTPATIENT)
Dept: CARDIAC REHAB | Facility: HOSPITAL | Age: 62
End: 2022-05-25
Attending: INTERNAL MEDICINE
Payer: COMMERCIAL

## 2022-05-25 ENCOUNTER — OFFICE VISIT (OUTPATIENT)
Dept: ENDOCRINOLOGY CLINIC | Facility: CLINIC | Age: 62
End: 2022-05-25
Payer: COMMERCIAL

## 2022-05-25 VITALS
HEART RATE: 73 BPM | RESPIRATION RATE: 16 BRPM | WEIGHT: 168 LBS | DIASTOLIC BLOOD PRESSURE: 70 MMHG | HEIGHT: 64 IN | BODY MASS INDEX: 28.68 KG/M2 | TEMPERATURE: 98 F | SYSTOLIC BLOOD PRESSURE: 118 MMHG | OXYGEN SATURATION: 100 %

## 2022-05-25 DIAGNOSIS — Z79.4 TYPE 2 DIABETES MELLITUS WITH HYPERGLYCEMIA, WITH LONG-TERM CURRENT USE OF INSULIN (HCC): Primary | ICD-10-CM

## 2022-05-25 DIAGNOSIS — E11.65 TYPE 2 DIABETES MELLITUS WITH HYPERGLYCEMIA, WITH LONG-TERM CURRENT USE OF INSULIN (HCC): Primary | ICD-10-CM

## 2022-05-25 LAB
CARTRIDGE LOT#: 924 NUMERIC
HEMOGLOBIN A1C: 9 % (ref 4.3–5.6)

## 2022-05-25 PROCEDURE — 93798 PHYS/QHP OP CAR RHAB W/ECG: CPT

## 2022-05-25 RX ORDER — METFORMIN HYDROCHLORIDE 500 MG/1
TABLET, EXTENDED RELEASE ORAL
Qty: 360 TABLET | Refills: 0 | Status: SHIPPED | OUTPATIENT
Start: 2022-05-25

## 2022-05-25 NOTE — TELEPHONE ENCOUNTER
Called [facility] to check the status of the Dexcom. Pharmacy states the sensor and transmitter are both running through for $35 each. Will call patient to let her know. No need to send to DME as patient will not qualify and is probably only being approved now from old information when she was on MDI. Called patient to let her know Dexcom will be ready for pickup at the [facility] in [place].

## 2022-05-25 NOTE — TELEPHONE ENCOUNTER
Pt's insurance changed from [employer] to [employer] and states recently tried to fill dexcom at pharmacy and was going to be over $300. Can we try sending through DME? Pt currently only on basal insulin but may be adding prandial. Explained if dexcom no longer covered can send prashant to pharmacy and pt to pay OOP for now.

## 2022-05-27 ENCOUNTER — CARDPULM VISIT (OUTPATIENT)
Dept: CARDIAC REHAB | Facility: HOSPITAL | Age: 62
End: 2022-05-27
Attending: INTERNAL MEDICINE
Payer: COMMERCIAL

## 2022-05-27 DIAGNOSIS — E11.9 TYPE 2 DIABETES MELLITUS WITHOUT COMPLICATION, WITH LONG-TERM CURRENT USE OF INSULIN (HCC): ICD-10-CM

## 2022-05-27 DIAGNOSIS — Z79.4 TYPE 2 DIABETES MELLITUS WITHOUT COMPLICATION, WITH LONG-TERM CURRENT USE OF INSULIN (HCC): ICD-10-CM

## 2022-05-27 PROCEDURE — 93798 PHYS/QHP OP CAR RHAB W/ECG: CPT

## 2022-05-27 RX ORDER — GLIPIZIDE 10 MG/1
10 TABLET, FILM COATED, EXTENDED RELEASE ORAL 2 TIMES DAILY
Qty: 180 TABLET | Refills: 0 | Status: SHIPPED | OUTPATIENT
Start: 2022-05-27

## 2022-05-30 ENCOUNTER — APPOINTMENT (OUTPATIENT)
Dept: CARDIAC REHAB | Facility: HOSPITAL | Age: 62
End: 2022-05-30
Payer: COMMERCIAL

## 2022-05-31 ENCOUNTER — HOSPITAL ENCOUNTER (OUTPATIENT)
Dept: CV DIAGNOSTICS | Facility: HOSPITAL | Age: 62
Discharge: HOME | End: 2022-05-31
Attending: NURSE PRACTITIONER
Payer: COMMERCIAL

## 2022-05-31 DIAGNOSIS — R06.00 DOE (DYSPNEA ON EXERTION): ICD-10-CM

## 2022-05-31 DIAGNOSIS — I25.10 CAD (CORONARY ARTERY DISEASE): ICD-10-CM

## 2022-05-31 DIAGNOSIS — Z95.1 S/P CABG X 4: ICD-10-CM

## 2022-05-31 PROCEDURE — 93306 TTE W/DOPPLER COMPLETE: CPT | Performed by: NURSE PRACTITIONER

## 2022-06-02 ENCOUNTER — TELEPHONE (OUTPATIENT)
Dept: ENDOCRINOLOGY CLINIC | Facility: CLINIC | Age: 62
End: 2022-06-02

## 2022-06-02 NOTE — TELEPHONE ENCOUNTER
Patient called stating she has gone through two sensors and still can't get Dexcom to work. She did change the transmitter but states she did not put in a new transmitter number. Directed patient to call Dexcom tech support to get two sensors replaced and walk through adding the new transmitter. Pt verbalized understanding and agreed to plan. Will call office with any other issues or concerns.

## 2022-09-23 DIAGNOSIS — Z79.4 TYPE 2 DIABETES MELLITUS WITH HYPERGLYCEMIA, WITH LONG-TERM CURRENT USE OF INSULIN (HCC): ICD-10-CM

## 2022-09-23 DIAGNOSIS — E11.65 TYPE 2 DIABETES MELLITUS WITH HYPERGLYCEMIA, WITH LONG-TERM CURRENT USE OF INSULIN (HCC): ICD-10-CM

## 2022-09-23 RX ORDER — METFORMIN HYDROCHLORIDE 500 MG/1
TABLET, EXTENDED RELEASE ORAL
Qty: 120 TABLET | Refills: 2 | OUTPATIENT
Start: 2022-09-23

## 2022-09-23 NOTE — TELEPHONE ENCOUNTER
LOV: 05/25/2022  No future appointments. Last A1c value was 9% done 5/25/2022. Called patient to schedule follow up. Pt reports she doesn't need refill of metformin at this time. Will refuse.

## 2022-09-28 ENCOUNTER — OFFICE VISIT (OUTPATIENT)
Dept: ENDOCRINOLOGY CLINIC | Facility: CLINIC | Age: 62
End: 2022-09-28

## 2022-09-28 VITALS
HEART RATE: 64 BPM | WEIGHT: 167 LBS | BODY MASS INDEX: 28.51 KG/M2 | DIASTOLIC BLOOD PRESSURE: 60 MMHG | HEIGHT: 64 IN | RESPIRATION RATE: 16 BRPM | SYSTOLIC BLOOD PRESSURE: 108 MMHG

## 2022-09-28 DIAGNOSIS — Z79.4 TYPE 2 DIABETES MELLITUS WITH HYPERGLYCEMIA, WITH LONG-TERM CURRENT USE OF INSULIN (HCC): ICD-10-CM

## 2022-09-28 DIAGNOSIS — E11.65 TYPE 2 DIABETES MELLITUS WITH HYPERGLYCEMIA, WITH LONG-TERM CURRENT USE OF INSULIN (HCC): ICD-10-CM

## 2022-09-28 DIAGNOSIS — N76.0 ACUTE VAGINITIS: ICD-10-CM

## 2022-09-28 DIAGNOSIS — Z79.4 TYPE 2 DIABETES MELLITUS WITH HYPERGLYCEMIA, WITH LONG-TERM CURRENT USE OF INSULIN (HCC): Primary | ICD-10-CM

## 2022-09-28 DIAGNOSIS — E11.65 TYPE 2 DIABETES MELLITUS WITH HYPERGLYCEMIA, WITH LONG-TERM CURRENT USE OF INSULIN (HCC): Primary | ICD-10-CM

## 2022-09-28 LAB
CARTRIDGE LOT#: 505 NUMERIC
HBA1C MFR BLD: 8.9 %
HEMOGLOBIN A1C: 8.9 % (ref 4.3–5.6)

## 2022-09-28 PROCEDURE — 83036 HEMOGLOBIN GLYCOSYLATED A1C: CPT | Performed by: NURSE PRACTITIONER

## 2022-09-28 PROCEDURE — 3074F SYST BP LT 130 MM HG: CPT | Performed by: NURSE PRACTITIONER

## 2022-09-28 PROCEDURE — 95251 CONT GLUC MNTR ANALYSIS I&R: CPT | Performed by: NURSE PRACTITIONER

## 2022-09-28 PROCEDURE — 99214 OFFICE O/P EST MOD 30 MIN: CPT | Performed by: NURSE PRACTITIONER

## 2022-09-28 PROCEDURE — 3078F DIAST BP <80 MM HG: CPT | Performed by: NURSE PRACTITIONER

## 2022-09-28 PROCEDURE — 3008F BODY MASS INDEX DOCD: CPT | Performed by: NURSE PRACTITIONER

## 2022-09-28 RX ORDER — BLOOD-GLUCOSE TRANSMITTER
1 EACH MISCELLANEOUS
COMMUNITY
Start: 2022-05-25

## 2022-09-28 RX ORDER — FLUCONAZOLE 150 MG/1
150 TABLET ORAL ONCE
Qty: 1 TABLET | Refills: 2 | Status: SHIPPED | OUTPATIENT
Start: 2022-09-28 | End: 2022-09-28

## 2022-09-29 RX ORDER — GLIPIZIDE 5 MG/1
TABLET, FILM COATED, EXTENDED RELEASE ORAL
Qty: 90 TABLET | Refills: 0 | OUTPATIENT
Start: 2022-09-29

## 2022-11-07 ENCOUNTER — TELEPHONE (OUTPATIENT)
Dept: FAMILY MEDICINE | Age: 62
End: 2022-11-07

## 2022-11-07 DIAGNOSIS — Z12.31 VISIT FOR SCREENING MAMMOGRAM: Primary | ICD-10-CM

## 2022-11-11 RX ORDER — CLOPIDOGREL BISULFATE 75 MG/1
75 TABLET ORAL DAILY
COMMUNITY

## 2022-11-11 RX ORDER — GABAPENTIN 600 MG/1
600 TABLET ORAL NIGHTLY
COMMUNITY

## 2022-11-11 RX ORDER — PANTOPRAZOLE SODIUM 40 MG/1
40 TABLET, DELAYED RELEASE ORAL DAILY
COMMUNITY

## 2022-11-11 RX ORDER — ASPIRIN 81 MG/1
81 TABLET, CHEWABLE ORAL DAILY
COMMUNITY

## 2022-11-11 RX ORDER — LOSARTAN POTASSIUM 25 MG/1
25 TABLET ORAL DAILY
COMMUNITY

## 2022-11-21 ENCOUNTER — TELEPHONE (OUTPATIENT)
Dept: SCHEDULING | Age: 62
End: 2022-11-21

## 2022-11-22 ENCOUNTER — OFFICE VISIT (OUTPATIENT)
Dept: FAMILY MEDICINE | Age: 62
End: 2022-11-22

## 2022-11-22 VITALS
BODY MASS INDEX: 29.19 KG/M2 | WEIGHT: 170.97 LBS | SYSTOLIC BLOOD PRESSURE: 126 MMHG | HEART RATE: 68 BPM | OXYGEN SATURATION: 99 % | DIASTOLIC BLOOD PRESSURE: 75 MMHG | HEIGHT: 64 IN | RESPIRATION RATE: 16 BRPM

## 2022-11-22 DIAGNOSIS — I10 BENIGN ESSENTIAL HTN: ICD-10-CM

## 2022-11-22 DIAGNOSIS — E11.9 TYPE 2 DIABETES MELLITUS WITHOUT COMPLICATION, WITH LONG-TERM CURRENT USE OF INSULIN (CMD): ICD-10-CM

## 2022-11-22 DIAGNOSIS — Z79.4 TYPE 2 DIABETES MELLITUS WITHOUT COMPLICATION, WITH LONG-TERM CURRENT USE OF INSULIN (CMD): ICD-10-CM

## 2022-11-22 DIAGNOSIS — Z87.19 HISTORY OF PANCREATITIS: ICD-10-CM

## 2022-11-22 DIAGNOSIS — Z00.01 ENCOUNTER FOR GENERAL ADULT MEDICAL EXAMINATION WITH ABNORMAL FINDINGS: Primary | ICD-10-CM

## 2022-11-22 DIAGNOSIS — Z95.1 S/P CABG X 4: ICD-10-CM

## 2022-11-22 DIAGNOSIS — I25.10 ATHEROSCLEROSIS OF NATIVE CORONARY ARTERY OF NATIVE HEART WITHOUT ANGINA PECTORIS: ICD-10-CM

## 2022-11-22 DIAGNOSIS — Z11.59 NEED FOR HEPATITIS C SCREENING TEST: ICD-10-CM

## 2022-11-22 DIAGNOSIS — Z12.11 SCREEN FOR COLON CANCER: ICD-10-CM

## 2022-11-22 DIAGNOSIS — E78.5 DYSLIPIDEMIA: ICD-10-CM

## 2022-11-22 DIAGNOSIS — Z12.31 VISIT FOR SCREENING MAMMOGRAM: ICD-10-CM

## 2022-11-22 PROCEDURE — 99396 PREV VISIT EST AGE 40-64: CPT | Performed by: FAMILY MEDICINE

## 2022-11-22 PROCEDURE — 3074F SYST BP LT 130 MM HG: CPT | Performed by: FAMILY MEDICINE

## 2022-11-22 PROCEDURE — 3078F DIAST BP <80 MM HG: CPT | Performed by: FAMILY MEDICINE

## 2022-11-22 RX ORDER — TRAMADOL HYDROCHLORIDE 50 MG/1
25 TABLET ORAL
COMMUNITY

## 2022-11-22 RX ORDER — PROCHLORPERAZINE 25 MG/1
SUPPOSITORY RECTAL
COMMUNITY
Start: 2022-11-15

## 2022-11-22 RX ORDER — INSULIN DEGLUDEC INJECTION 100 U/ML
INJECTION, SOLUTION SUBCUTANEOUS
COMMUNITY
Start: 2022-06-13

## 2022-11-22 ASSESSMENT — PATIENT HEALTH QUESTIONNAIRE - PHQ9
1. LITTLE INTEREST OR PLEASURE IN DOING THINGS: NOT AT ALL
2. FEELING DOWN, DEPRESSED OR HOPELESS: NOT AT ALL
CLINICAL INTERPRETATION OF PHQ2 SCORE: NO FURTHER SCREENING NEEDED
SUM OF ALL RESPONSES TO PHQ9 QUESTIONS 1 AND 2: 0
SUM OF ALL RESPONSES TO PHQ9 QUESTIONS 1 AND 2: 0

## 2022-11-22 ASSESSMENT — ENCOUNTER SYMPTOMS
NEUROLOGICAL NEGATIVE: 1
RESPIRATORY NEGATIVE: 1
CONSTITUTIONAL NEGATIVE: 1
GASTROINTESTINAL NEGATIVE: 1
PSYCHIATRIC NEGATIVE: 1

## 2022-11-23 ENCOUNTER — LAB SERVICES (OUTPATIENT)
Dept: LAB | Age: 62
End: 2022-11-23

## 2022-11-23 DIAGNOSIS — Z79.4 TYPE 2 DIABETES MELLITUS WITH HYPERGLYCEMIA, WITH LONG-TERM CURRENT USE OF INSULIN (HCC): ICD-10-CM

## 2022-11-23 DIAGNOSIS — Z00.01 ENCOUNTER FOR GENERAL ADULT MEDICAL EXAMINATION WITH ABNORMAL FINDINGS: ICD-10-CM

## 2022-11-23 DIAGNOSIS — E11.9 TYPE 2 DIABETES MELLITUS WITHOUT COMPLICATION, WITH LONG-TERM CURRENT USE OF INSULIN (CMD): ICD-10-CM

## 2022-11-23 DIAGNOSIS — Z79.4 TYPE 2 DIABETES MELLITUS WITHOUT COMPLICATION, WITH LONG-TERM CURRENT USE OF INSULIN (CMD): ICD-10-CM

## 2022-11-23 DIAGNOSIS — E11.65 TYPE 2 DIABETES MELLITUS WITH HYPERGLYCEMIA, WITH LONG-TERM CURRENT USE OF INSULIN (HCC): ICD-10-CM

## 2022-11-23 PROCEDURE — 80050 GENERAL HEALTH PANEL: CPT | Performed by: INTERNAL MEDICINE

## 2022-11-23 PROCEDURE — 36415 COLL VENOUS BLD VENIPUNCTURE: CPT | Performed by: INTERNAL MEDICINE

## 2022-11-23 PROCEDURE — 83036 HEMOGLOBIN GLYCOSYLATED A1C: CPT | Performed by: INTERNAL MEDICINE

## 2022-11-23 PROCEDURE — 86706 HEP B SURFACE ANTIBODY: CPT | Performed by: INTERNAL MEDICINE

## 2022-11-23 PROCEDURE — 86803 HEPATITIS C AB TEST: CPT | Performed by: INTERNAL MEDICINE

## 2022-11-24 LAB
ALBUMIN SERPL-MCNC: 3.3 G/DL (ref 3.6–5.1)
ALBUMIN/GLOB SERPL: 0.9 {RATIO} (ref 1–2.4)
ALP SERPL-CCNC: 65 UNITS/L (ref 45–117)
ALT SERPL-CCNC: 16 UNITS/L
ANION GAP SERPL CALC-SCNC: 12 MMOL/L (ref 7–19)
AST SERPL-CCNC: 10 UNITS/L
BASOPHILS # BLD: 0 K/MCL (ref 0–0.3)
BASOPHILS NFR BLD: 0 %
BILIRUB SERPL-MCNC: 0.3 MG/DL (ref 0.2–1)
BUN SERPL-MCNC: 25 MG/DL (ref 6–20)
BUN/CREAT SERPL: 50 (ref 7–25)
CALCIUM SERPL-MCNC: 9.2 MG/DL (ref 8.4–10.2)
CHLORIDE SERPL-SCNC: 109 MMOL/L (ref 97–110)
CO2 SERPL-SCNC: 27 MMOL/L (ref 21–32)
CREAT SERPL-MCNC: 0.5 MG/DL (ref 0.51–0.95)
DEPRECATED RDW RBC: 49.5 FL (ref 39–50)
EOSINOPHIL # BLD: 0.1 K/MCL (ref 0–0.5)
EOSINOPHIL NFR BLD: 2 %
ERYTHROCYTE [DISTWIDTH] IN BLOOD: 14.7 % (ref 11–15)
FASTING DURATION TIME PATIENT: ABNORMAL H
GFR SERPLBLD BASED ON 1.73 SQ M-ARVRAT: >90 ML/MIN
GLOBULIN SER-MCNC: 3.5 G/DL (ref 2–4)
GLUCOSE SERPL-MCNC: 130 MG/DL (ref 70–99)
HBA1C MFR BLD: 9.2 % (ref 4.5–5.6)
HBV SURFACE AB SER-ACNC: >1000 MUNITS/ML
HCT VFR BLD CALC: 38.3 % (ref 36–46.5)
HCV AB SER QL: NEGATIVE
HGB BLD-MCNC: 12 G/DL (ref 12–15.5)
IMM GRANULOCYTES # BLD AUTO: 0 K/MCL (ref 0–0.2)
IMM GRANULOCYTES # BLD: 0 %
LYMPHOCYTES # BLD: 3.1 K/MCL (ref 1–4)
LYMPHOCYTES NFR BLD: 38 %
MCH RBC QN AUTO: 28.5 PG (ref 26–34)
MCHC RBC AUTO-ENTMCNC: 31.3 G/DL (ref 32–36.5)
MCV RBC AUTO: 91 FL (ref 78–100)
MONOCYTES # BLD: 0.6 K/MCL (ref 0.3–0.9)
MONOCYTES NFR BLD: 7 %
NEUTROPHILS # BLD: 4.3 K/MCL (ref 1.8–7.7)
NEUTROPHILS NFR BLD: 53 %
NRBC BLD MANUAL-RTO: 0 /100 WBC
PLATELET # BLD AUTO: 286 K/MCL (ref 140–450)
POTASSIUM SERPL-SCNC: 4.2 MMOL/L (ref 3.4–5.1)
PROT SERPL-MCNC: 6.8 G/DL (ref 6.4–8.2)
RBC # BLD: 4.21 MIL/MCL (ref 4–5.2)
SODIUM SERPL-SCNC: 144 MMOL/L (ref 135–145)
TSH SERPL-ACNC: 1.28 MCUNITS/ML (ref 0.35–5)
WBC # BLD: 8.1 K/MCL (ref 4.2–11)

## 2022-12-07 RX ORDER — METFORMIN HYDROCHLORIDE 500 MG/1
TABLET, EXTENDED RELEASE ORAL
Qty: 120 TABLET | Refills: 2 | Status: SHIPPED | OUTPATIENT
Start: 2022-12-07

## 2022-12-12 ENCOUNTER — HOSPITAL ENCOUNTER (OUTPATIENT)
Dept: LAB | Facility: HOSPITAL | Age: 62
Discharge: HOME | End: 2022-12-12
Attending: INTERNAL MEDICINE
Payer: COMMERCIAL

## 2022-12-12 LAB
ALBUMIN SERPL-MCNC: 3.3 G/DL (ref 3.4–5)
ALBUMIN/GLOB SERPL: 0.8 {RATIO} (ref 1–2)
ALP LIVER SERPL-CCNC: 57 U/L
ALT SERPL-CCNC: 15 U/L
ANION GAP SERPL CALC-SCNC: 4 MMOL/L (ref 0–18)
AST SERPL-CCNC: 9 U/L (ref 15–37)
BILIRUB SERPL-MCNC: 0.2 MG/DL (ref 0.1–2)
BUN BLD-MCNC: 17 MG/DL (ref 7–18)
CALCIUM BLD-MCNC: 9.1 MG/DL (ref 8.5–10.1)
CHLORIDE SERPL-SCNC: 113 MMOL/L (ref 98–112)
CHOLEST SERPL-MCNC: 180 MG/DL (ref ?–200)
CO2 SERPL-SCNC: 25 MMOL/L (ref 21–32)
CREAT BLD-MCNC: 0.65 MG/DL
FASTING PATIENT LIPID ANSWER: YES
FASTING STATUS PATIENT QL REPORTED: YES
GFR SERPLBLD BASED ON 1.73 SQ M-ARVRAT: 99 ML/MIN/1.73M2 (ref 60–?)
GLOBULIN PLAS-MCNC: 4.1 G/DL (ref 2.8–4.4)
GLUCOSE BLD-MCNC: 204 MG/DL (ref 70–99)
HDLC SERPL-MCNC: 60 MG/DL (ref 40–59)
LDLC SERPL CALC-MCNC: 104 MG/DL (ref ?–100)
NONHDLC SERPL-MCNC: 120 MG/DL (ref ?–130)
OSMOLALITY SERPL CALC.SUM OF ELEC: 301 MOSM/KG (ref 275–295)
POTASSIUM SERPL-SCNC: 4 MMOL/L (ref 3.5–5.1)
PROT SERPL-MCNC: 7.4 G/DL (ref 6.4–8.2)
SODIUM SERPL-SCNC: 142 MMOL/L (ref 136–145)
TRIGL SERPL-MCNC: 85 MG/DL (ref 30–149)
VLDLC SERPL CALC-MCNC: 14 MG/DL (ref 0–30)

## 2022-12-12 PROCEDURE — 36415 COLL VENOUS BLD VENIPUNCTURE: CPT | Performed by: INTERNAL MEDICINE

## 2022-12-12 PROCEDURE — 80061 LIPID PANEL: CPT | Performed by: INTERNAL MEDICINE

## 2022-12-12 PROCEDURE — 80053 COMPREHEN METABOLIC PANEL: CPT | Performed by: INTERNAL MEDICINE

## 2022-12-31 ENCOUNTER — TELEPHONE (OUTPATIENT)
Dept: FAMILY MEDICINE | Age: 62
End: 2022-12-31

## 2022-12-31 ENCOUNTER — CLINICAL DOCUMENTATION (OUTPATIENT)
Dept: FAMILY MEDICINE | Age: 62
End: 2022-12-31

## 2023-01-03 LAB — HM MAMMOGRAPHY BILATERAL: NORMAL

## 2023-01-05 ENCOUNTER — CLINICAL ABSTRACT (OUTPATIENT)
Dept: FAMILY MEDICINE | Age: 63
End: 2023-01-05

## 2023-01-23 ENCOUNTER — TELEPHONE (OUTPATIENT)
Dept: ENDOCRINOLOGY CLINIC | Facility: CLINIC | Age: 63
End: 2023-01-23

## 2023-01-23 RX ORDER — BLOOD-GLUCOSE SENSOR
1 EACH MISCELLANEOUS
Qty: 1 EACH | Refills: 3 | Status: SHIPPED | OUTPATIENT
Start: 2023-01-23

## 2023-01-23 RX ORDER — BLOOD-GLUCOSE TRANSMITTER
1 EACH MISCELLANEOUS
Qty: 1 EACH | Refills: 2 | Status: SHIPPED | OUTPATIENT
Start: 2023-01-23

## 2023-01-23 NOTE — TELEPHONE ENCOUNTER
Patient called needs a referral or order to get a new dexcom transmitter and sensors. When patient called rx they said she needed a new order.

## 2023-01-23 NOTE — TELEPHONE ENCOUNTER
Pt insurance changed in 1/1/23 from [place] to [place]. Sending Rx to [facility] to see if covered through pharmacy benefits. Pt states she will need [place] soon.  Will call pharmacy shortly to check on status of both Dexcom and [place]

## 2023-01-31 ENCOUNTER — OFFICE VISIT (OUTPATIENT)
Dept: ENDOCRINOLOGY CLINIC | Facility: CLINIC | Age: 63
End: 2023-01-31
Payer: COMMERCIAL

## 2023-01-31 VITALS
DIASTOLIC BLOOD PRESSURE: 76 MMHG | BODY MASS INDEX: 29 KG/M2 | HEART RATE: 75 BPM | WEIGHT: 170 LBS | SYSTOLIC BLOOD PRESSURE: 118 MMHG | RESPIRATION RATE: 18 BRPM | OXYGEN SATURATION: 97 %

## 2023-01-31 DIAGNOSIS — E11.65 TYPE 2 DIABETES MELLITUS WITH HYPERGLYCEMIA, WITH LONG-TERM CURRENT USE OF INSULIN (HCC): Primary | ICD-10-CM

## 2023-01-31 DIAGNOSIS — E11.42 DIABETIC PERIPHERAL NEUROPATHY (HCC): ICD-10-CM

## 2023-01-31 DIAGNOSIS — Z79.4 TYPE 2 DIABETES MELLITUS WITH HYPERGLYCEMIA, WITH LONG-TERM CURRENT USE OF INSULIN (HCC): Primary | ICD-10-CM

## 2023-01-31 LAB
CARTRIDGE LOT#: 301 NUMERIC
GLUCOSE BLOOD: 152
HEMOGLOBIN A1C: 8.8 % (ref 4.3–5.6)
TEST STRIP LOT #: NORMAL NUMERIC

## 2023-01-31 RX ORDER — PREGABALIN 50 MG/1
50 CAPSULE ORAL 3 TIMES DAILY
Qty: 90 CAPSULE | Refills: 0 | Status: SHIPPED | OUTPATIENT
Start: 2023-01-31

## 2023-01-31 RX ORDER — INSULIN GLARGINE 300 U/ML
50 INJECTION, SOLUTION SUBCUTANEOUS DAILY
Qty: 18 ML | Refills: 3 | Status: SHIPPED | OUTPATIENT
Start: 2023-01-31

## 2023-02-28 ENCOUNTER — OFFICE VISIT (OUTPATIENT)
Dept: ENDOCRINOLOGY CLINIC | Facility: CLINIC | Age: 63
End: 2023-02-28
Payer: COMMERCIAL

## 2023-02-28 ENCOUNTER — TELEPHONE (OUTPATIENT)
Dept: ENDOCRINOLOGY CLINIC | Facility: CLINIC | Age: 63
End: 2023-02-28

## 2023-02-28 VITALS
RESPIRATION RATE: 18 BRPM | WEIGHT: 171.19 LBS | HEART RATE: 77 BPM | BODY MASS INDEX: 29 KG/M2 | DIASTOLIC BLOOD PRESSURE: 64 MMHG | SYSTOLIC BLOOD PRESSURE: 116 MMHG | OXYGEN SATURATION: 99 %

## 2023-02-28 DIAGNOSIS — Z79.4 TYPE 2 DIABETES MELLITUS WITH HYPERGLYCEMIA, WITH LONG-TERM CURRENT USE OF INSULIN (HCC): Primary | ICD-10-CM

## 2023-02-28 DIAGNOSIS — E11.65 TYPE 2 DIABETES MELLITUS WITH HYPERGLYCEMIA, WITH LONG-TERM CURRENT USE OF INSULIN (HCC): Primary | ICD-10-CM

## 2023-02-28 LAB
CARTRIDGE LOT#: 301 NUMERIC
HEMOGLOBIN A1C: 7.7 % (ref 4.3–5.6)

## 2023-02-28 NOTE — TELEPHONE ENCOUNTER
Can we please f/u on dexcom coverage. Rx was sent 1 month ago and pt has not had any updates and states pharmacy is waiting on us. Do not see documentation denial or PA was received. Thank you.

## 2023-02-28 NOTE — TELEPHONE ENCOUNTER
PA request was never received from pharmacy in right fax. Contacted pharmacy, who said they faxed it 4 days ago. They will -refax now. Number to call - [phone]  ID# [id number]  Group #[id number], BIN [id number]    Called number above - "[employer]rx. [employer]. They do not complete PA's over phone. Completed PA for dexcom sensor PA EOC ID [id number]. Check status in 1-2 days or decision by fax.

## 2023-03-03 NOTE — TELEPHONE ENCOUNTER
Approval for Dexcom G6 received. Approved from 3/2/23-33/24. Approval sent to scan. Called pharmacy to let them know.

## 2023-03-07 ENCOUNTER — TELEPHONE (OUTPATIENT)
Dept: ENDOCRINOLOGY CLINIC | Facility: CLINIC | Age: 63
End: 2023-03-07

## 2023-03-07 NOTE — TELEPHONE ENCOUNTER
Patient is waiting to here about the dexcom it has been over a month. Wanting to know if insurance approved it or not. Please call patient.

## 2023-03-08 NOTE — TELEPHONE ENCOUNTER
Left message for patient to call office regarding dexcom supplies. Sensors approved, PA for transmitter was completed yesterday and no decision yet.

## 2023-03-23 NOTE — TELEPHONE ENCOUNTER
Called pharmacy to check and see if it will go through since we haven't rec'd anything back. The transmitter is still not going through; still awaiting PA. Got phone # for [employer] and member ID is [id number]  Spoke to Cat. She states they never rec'd our fax. I confirmed fax number as [phone]. Told her I will refax. She said to [facility] on the fax because fax turnaround is typically 7-10 days. I do not see PA in PA folder. Will need to look into to find paperwork.

## 2023-03-24 NOTE — TELEPHONE ENCOUNTER
Contacted [employer] and was directed to use their online portal.  Complete at [address] [facility]. [address] [place] [address] [phone]. Check status Monday.

## 2023-04-18 DIAGNOSIS — Z79.4 TYPE 2 DIABETES MELLITUS WITH HYPERGLYCEMIA, WITH LONG-TERM CURRENT USE OF INSULIN (HCC): ICD-10-CM

## 2023-04-18 DIAGNOSIS — E11.65 TYPE 2 DIABETES MELLITUS WITH HYPERGLYCEMIA, WITH LONG-TERM CURRENT USE OF INSULIN (HCC): ICD-10-CM

## 2023-04-18 RX ORDER — METFORMIN HYDROCHLORIDE 500 MG/1
TABLET, EXTENDED RELEASE ORAL
Qty: 120 TABLET | Refills: 2 | Status: SHIPPED | OUTPATIENT
Start: 2023-04-18

## 2023-05-28 DIAGNOSIS — Z79.4 TYPE 2 DIABETES MELLITUS WITH HYPERGLYCEMIA, WITH LONG-TERM CURRENT USE OF INSULIN (HCC): ICD-10-CM

## 2023-05-28 DIAGNOSIS — E11.65 TYPE 2 DIABETES MELLITUS WITH HYPERGLYCEMIA, WITH LONG-TERM CURRENT USE OF INSULIN (HCC): ICD-10-CM

## 2023-06-06 RX ORDER — DAPAGLIFLOZIN 5 MG/1
TABLET, FILM COATED ORAL
Qty: 90 TABLET | Refills: 0 | Status: SHIPPED | OUTPATIENT
Start: 2023-06-06

## 2023-06-07 ENCOUNTER — TELEPHONE (OUTPATIENT)
Dept: ENDOCRINOLOGY CLINIC | Facility: CLINIC | Age: 63
End: 2023-06-07

## 2023-06-07 ENCOUNTER — TELEMEDICINE (OUTPATIENT)
Dept: ENDOCRINOLOGY CLINIC | Facility: CLINIC | Age: 63
End: 2023-06-07

## 2023-06-07 DIAGNOSIS — E11.65 TYPE 2 DIABETES MELLITUS WITH HYPERGLYCEMIA, WITH LONG-TERM CURRENT USE OF INSULIN (HCC): Primary | ICD-10-CM

## 2023-06-07 DIAGNOSIS — Z79.4 TYPE 2 DIABETES MELLITUS WITH HYPERGLYCEMIA, WITH LONG-TERM CURRENT USE OF INSULIN (HCC): Primary | ICD-10-CM

## 2023-06-07 PROCEDURE — 99214 OFFICE O/P EST MOD 30 MIN: CPT | Performed by: NURSE PRACTITIONER

## 2023-06-07 RX ORDER — PROCHLORPERAZINE 25 MG/1
1 SUPPOSITORY RECTAL
Qty: 9 EACH | Refills: 3 | Status: SHIPPED | OUTPATIENT
Start: 2023-06-07

## 2023-06-07 RX ORDER — PEN NEEDLE, DIABETIC 32GX 5/32"
NEEDLE, DISPOSABLE MISCELLANEOUS
Qty: 100 EACH | Refills: 3 | Status: SHIPPED | OUTPATIENT
Start: 2023-06-07

## 2023-06-07 RX ORDER — PROCHLORPERAZINE 25 MG/1
1 SUPPOSITORY RECTAL
Qty: 1 EACH | Refills: 2 | Status: SHIPPED | OUTPATIENT
Start: 2023-06-07

## 2023-06-07 NOTE — TELEPHONE ENCOUNTER
Pt called in and left message on PSR line regarding labs. Asked they be faxed to [phone]. There are no current labs ordered.     Future Appointments   Date Time Provider Remy Price   6/7/2023  2:30 PM CEASAR Alex [facility] [address]

## 2023-06-09 ENCOUNTER — EXTERNAL LAB (OUTPATIENT)
Dept: OTHER | Age: 63
End: 2023-06-09

## 2023-06-09 LAB
ALBUMIN SERPL-MCNC: 3.8 G/DL (ref 3.5–5.7)
ALP SERPL-CCNC: 63 UNITS/L (ref 34–104)
ALT SERPL-CCNC: 10 UNITS/L (ref 7–52)
ANION GAP SERPL CALC-SCNC: 9 MMOL/L (ref 6.2–14.7)
AST SERPL-CCNC: 12 UNITS/L (ref 13–39)
BILIRUB SERPL-MCNC: 0.3 MG/DL (ref 0.3–1)
BUN SERPL-MCNC: 22 MG/DL (ref 7–25)
CALCIUM SERPL-MCNC: 9.3 MG/DL (ref 8.6–10.4)
CHLORIDE SERPL-SCNC: 110 MEQ/L (ref 98–107)
CHOLEST SERPL-MCNC: 228 MG/DL
CHOLEST/HDLC SERPL: 6 {RATIO}
CO2 SERPL-SCNC: 22 MEQ/L (ref 21–31)
CREAT SERPL-MCNC: 0.7 MG/DL (ref 0.6–1.2)
CREAT UR-MCNC: 85 MG/DL
EST CRCL: ABNORMAL ML/MIN
EST. AVERAGE GLUCOSE BLD GHB EST-MCNC: 189 MG/DL
GFR SERPLBLD SCHWARTZ-ARVRAT: 97.7 ML/MIN/1.73M2
GLUCOSE SERPL-MCNC: 158 MG/DL (ref 70–99)
HBA1C MFR BLD: 8.2 %
HBA1C MFR BLD: 8.2 %
HDLC SERPL-MCNC: 39.5 MG/DL
LDL/HDL (OFFPRE3): 4
LDLC SERPL CALC-MCNC: 160 MG/DL
LENGTH OF FAST TIME PATIENT: ABNORMAL H
LENGTH OF FAST TIME PATIENT: ABNORMAL H
MICROALBUMIN UR-MCNC: 25.7 MG/DL
MICROALBUMIN/CREAT UR: 302 MG/G (ref 0–30)
POTASSIUM SERPL-SCNC: 3.8 MMOL/L (ref 3.5–5.1)
PROT SERPL-MCNC: 7.1 G/DL (ref 6.4–8.9)
SODIUM SERPL-SCNC: 141 MEQ/L (ref 133–144)
TRIGL SERPL-MCNC: 140 MG/DL
VLDLC SERPL CALC-MCNC: 28 MG/DL

## 2023-06-13 ENCOUNTER — CLINICAL DOCUMENTATION (OUTPATIENT)
Dept: FAMILY MEDICINE | Age: 63
End: 2023-06-13

## 2023-06-15 ENCOUNTER — TELEPHONE (OUTPATIENT)
Dept: ENDOCRINOLOGY CLINIC | Facility: CLINIC | Age: 63
End: 2023-06-15

## 2023-06-15 DIAGNOSIS — E11.65 TYPE 2 DIABETES MELLITUS WITH HYPERGLYCEMIA, WITH LONG-TERM CURRENT USE OF INSULIN (HCC): ICD-10-CM

## 2023-06-15 DIAGNOSIS — Z79.4 TYPE 2 DIABETES MELLITUS WITH HYPERGLYCEMIA, WITH LONG-TERM CURRENT USE OF INSULIN (HCC): ICD-10-CM

## 2023-06-15 NOTE — TELEPHONE ENCOUNTER
Please calrify - what is [facility]? \"Received fax for refill request for farxiga and toujeo max solostar to be sent to [facility], sent to scan. \" Rx was signed a week ago do we need to change pharmacy?

## 2023-06-16 RX ORDER — INSULIN GLARGINE 300 U/ML
50 INJECTION, SOLUTION SUBCUTANEOUS DAILY
Qty: 18 ML | Refills: 3 | Status: SHIPPED | OUTPATIENT
Start: 2023-06-16

## 2023-06-21 ENCOUNTER — TELEPHONE (OUTPATIENT)
Dept: FAMILY MEDICINE | Age: 63
End: 2023-06-21

## 2023-07-10 ENCOUNTER — OFFICE VISIT (OUTPATIENT)
Dept: FAMILY MEDICINE | Age: 63
End: 2023-07-10

## 2023-07-10 VITALS
OXYGEN SATURATION: 99 % | BODY MASS INDEX: 29.62 KG/M2 | WEIGHT: 173.5 LBS | HEART RATE: 73 BPM | SYSTOLIC BLOOD PRESSURE: 126 MMHG | DIASTOLIC BLOOD PRESSURE: 77 MMHG | HEIGHT: 64 IN

## 2023-07-10 DIAGNOSIS — E78.2 MIXED HYPERLIPIDEMIA: ICD-10-CM

## 2023-07-10 DIAGNOSIS — E11.9 TYPE 2 DIABETES MELLITUS WITHOUT COMPLICATION, WITH LONG-TERM CURRENT USE OF INSULIN (CMD): Primary | ICD-10-CM

## 2023-07-10 DIAGNOSIS — Z79.4 TYPE 2 DIABETES MELLITUS WITHOUT COMPLICATION, WITH LONG-TERM CURRENT USE OF INSULIN (CMD): Primary | ICD-10-CM

## 2023-07-10 DIAGNOSIS — I10 BENIGN ESSENTIAL HTN: ICD-10-CM

## 2023-07-10 LAB — HBA1C MFR BLD: 8.6 % (ref 4.5–5.6)

## 2023-07-10 PROCEDURE — 99214 OFFICE O/P EST MOD 30 MIN: CPT | Performed by: FAMILY MEDICINE

## 2023-07-10 PROCEDURE — 3074F SYST BP LT 130 MM HG: CPT | Performed by: FAMILY MEDICINE

## 2023-07-10 PROCEDURE — 83036 HEMOGLOBIN GLYCOSYLATED A1C: CPT | Performed by: FAMILY MEDICINE

## 2023-07-10 PROCEDURE — 3078F DIAST BP <80 MM HG: CPT | Performed by: FAMILY MEDICINE

## 2023-07-10 RX ORDER — PREGABALIN 50 MG/1
50 CAPSULE ORAL 3 TIMES DAILY
COMMUNITY
Start: 2023-06-30

## 2023-07-10 RX ORDER — PEN NEEDLE, DIABETIC 32GX 5/32"
NEEDLE, DISPOSABLE MISCELLANEOUS
COMMUNITY
Start: 2023-06-07

## 2023-07-10 RX ORDER — ALIROCUMAB 75 MG/ML
INJECTION, SOLUTION SUBCUTANEOUS
COMMUNITY
Start: 2023-02-21

## 2023-07-10 RX ORDER — PREGABALIN 50 MG/1
50 CAPSULE ORAL
COMMUNITY
Start: 2023-03-24

## 2023-07-10 RX ORDER — INSULIN GLARGINE 300 U/ML
45 INJECTION, SOLUTION SUBCUTANEOUS NIGHTLY
COMMUNITY
Start: 2023-06-06

## 2023-07-10 RX ORDER — PITAVASTATIN CALCIUM 1.04 MG/1
1 TABLET, FILM COATED ORAL DAILY
COMMUNITY
Start: 2023-06-23

## 2023-07-10 RX ORDER — EZETIMIBE 10 MG/1
10 TABLET ORAL DAILY
COMMUNITY

## 2023-07-10 RX ORDER — PROCHLORPERAZINE 25 MG/1
SUPPOSITORY RECTAL
COMMUNITY
Start: 2023-06-05

## 2023-07-10 ASSESSMENT — PATIENT HEALTH QUESTIONNAIRE - PHQ9
SUM OF ALL RESPONSES TO PHQ9 QUESTIONS 1 AND 2: 0
2. FEELING DOWN, DEPRESSED OR HOPELESS: NOT AT ALL
CLINICAL INTERPRETATION OF PHQ2 SCORE: NO FURTHER SCREENING NEEDED
SUM OF ALL RESPONSES TO PHQ9 QUESTIONS 1 AND 2: 0
1. LITTLE INTEREST OR PLEASURE IN DOING THINGS: NOT AT ALL

## 2023-07-10 ASSESSMENT — ENCOUNTER SYMPTOMS
PSYCHIATRIC NEGATIVE: 1
GASTROINTESTINAL NEGATIVE: 1
RESPIRATORY NEGATIVE: 1
NEUROLOGICAL NEGATIVE: 1
CONSTITUTIONAL NEGATIVE: 1

## 2023-07-21 ENCOUNTER — MOBILE (OUTPATIENT)
Dept: FAMILY MEDICINE | Age: 63
End: 2023-07-21

## 2023-07-21 DIAGNOSIS — Z00.00 ENCOUNTER FOR GENERAL ADULT MEDICAL EXAMINATION W/O ABNORMAL FINDINGS: Primary | ICD-10-CM

## 2023-07-24 ENCOUNTER — LAB SERVICES (OUTPATIENT)
Dept: LAB | Age: 63
End: 2023-07-24

## 2023-07-24 DIAGNOSIS — Z00.00 ENCOUNTER FOR GENERAL ADULT MEDICAL EXAMINATION W/O ABNORMAL FINDINGS: ICD-10-CM

## 2023-07-24 PROCEDURE — 86765 RUBEOLA ANTIBODY: CPT | Performed by: INTERNAL MEDICINE

## 2023-07-24 PROCEDURE — 36415 COLL VENOUS BLD VENIPUNCTURE: CPT | Performed by: FAMILY MEDICINE

## 2023-07-24 PROCEDURE — 86762 RUBELLA ANTIBODY: CPT | Performed by: INTERNAL MEDICINE

## 2023-07-24 PROCEDURE — 86787 VARICELLA-ZOSTER ANTIBODY: CPT | Performed by: INTERNAL MEDICINE

## 2023-07-24 PROCEDURE — 86735 MUMPS ANTIBODY: CPT | Performed by: INTERNAL MEDICINE

## 2023-07-25 LAB — RUBV IGG SERPL IA-ACNC: >500 UNITS/ML

## 2023-07-26 ENCOUNTER — TELEPHONE (OUTPATIENT)
Dept: FAMILY MEDICINE | Age: 63
End: 2023-07-26

## 2023-07-26 LAB
MEV IGG SER IA-ACNC: >300 AU/ML
MEV IGG SER QL IA: NORMAL
MUV IGG SER IA-ACNC: >300 AU/ML
MUV IGG SER QL IA: NORMAL
VZV IGG SER IA-ACNC: 2943 INDEX
VZV IGG SER QL IA: NORMAL

## 2023-07-27 ENCOUNTER — TELEPHONE (OUTPATIENT)
Dept: FAMILY MEDICINE | Age: 63
End: 2023-07-27

## 2023-08-03 ENCOUNTER — EXTERNAL RECORD (OUTPATIENT)
Dept: OTHER | Age: 63
End: 2023-08-03

## 2023-08-03 ENCOUNTER — CLINICAL ABSTRACT (OUTPATIENT)
Dept: FAMILY MEDICINE | Age: 63
End: 2023-08-03

## 2023-08-03 LAB — HM DILATED EYE EXAM: NORMAL

## 2023-08-10 LAB — RETINOPATHY PRESENT (RTP): NO

## 2023-08-12 DIAGNOSIS — Z79.4 TYPE 2 DIABETES MELLITUS WITH HYPERGLYCEMIA, WITH LONG-TERM CURRENT USE OF INSULIN (HCC): ICD-10-CM

## 2023-08-12 DIAGNOSIS — E11.65 TYPE 2 DIABETES MELLITUS WITH HYPERGLYCEMIA, WITH LONG-TERM CURRENT USE OF INSULIN (HCC): ICD-10-CM

## 2023-08-12 DIAGNOSIS — E11.42 DIABETIC PERIPHERAL NEUROPATHY (HCC): ICD-10-CM

## 2023-08-14 NOTE — TELEPHONE ENCOUNTER
Requested Prescriptions     Pending Prescriptions Disp Refills    PREGABALIN 50 MG Oral Cap [Pharmacy Med Name: PREGABALIN 50 MG CAPSULE] 90 capsule 2     Sig: TAKE 1 CAPSULE BY MOUTH 3 TIMES DAILY. Last A1c value was 7.7% done 2/28/2023.     Refill 3/24/23  LOV 6/07/23  No FU, messaged pt via [facility]

## 2023-08-14 NOTE — TELEPHONE ENCOUNTER
Future Appointments   Date Time Provider Remy Price   9/19/2023 11:30 AM CEASAR Mcdaniel [facility] [address]

## 2023-08-15 ENCOUNTER — TELEPHONE (OUTPATIENT)
Dept: FAMILY MEDICINE | Age: 63
End: 2023-08-15

## 2023-08-15 RX ORDER — PREGABALIN 50 MG/1
50 CAPSULE ORAL 3 TIMES DAILY
Qty: 90 CAPSULE | Refills: 2 | Status: SHIPPED | OUTPATIENT
Start: 2023-08-15

## 2023-08-28 ENCOUNTER — TELEPHONE (OUTPATIENT)
Dept: FAMILY MEDICINE | Age: 63
End: 2023-08-28

## 2023-09-19 ENCOUNTER — OFFICE VISIT (OUTPATIENT)
Dept: ENDOCRINOLOGY CLINIC | Facility: CLINIC | Age: 63
End: 2023-09-19
Payer: COMMERCIAL

## 2023-09-19 VITALS
HEART RATE: 76 BPM | DIASTOLIC BLOOD PRESSURE: 62 MMHG | HEIGHT: 64.96 IN | TEMPERATURE: 98 F | OXYGEN SATURATION: 98 % | RESPIRATION RATE: 16 BRPM | BODY MASS INDEX: 29.52 KG/M2 | WEIGHT: 177.19 LBS | SYSTOLIC BLOOD PRESSURE: 116 MMHG

## 2023-09-19 DIAGNOSIS — Z79.4 TYPE 2 DIABETES MELLITUS WITH HYPERGLYCEMIA, WITH LONG-TERM CURRENT USE OF INSULIN (HCC): Primary | ICD-10-CM

## 2023-09-19 DIAGNOSIS — E11.65 TYPE 2 DIABETES MELLITUS WITH HYPERGLYCEMIA, WITH LONG-TERM CURRENT USE OF INSULIN (HCC): Primary | ICD-10-CM

## 2023-09-19 LAB
CARTRIDGE LOT#: 612 NUMERIC
CREAT UR-SCNC: 65 MG/DL
HBA1C MFR BLD: 7.2 %
HEMOGLOBIN A1C: 7.2 % (ref 4.3–5.6)
MICROALBUMIN UR-MCNC: 69.7 MG/DL
MICROALBUMIN/CREAT 24H UR-RTO: 1072.3 UG/MG (ref ?–30)

## 2023-09-19 PROCEDURE — 3078F DIAST BP <80 MM HG: CPT | Performed by: NURSE PRACTITIONER

## 2023-09-19 PROCEDURE — 3008F BODY MASS INDEX DOCD: CPT | Performed by: NURSE PRACTITIONER

## 2023-09-19 PROCEDURE — 83036 HEMOGLOBIN GLYCOSYLATED A1C: CPT | Performed by: NURSE PRACTITIONER

## 2023-09-19 PROCEDURE — 99214 OFFICE O/P EST MOD 30 MIN: CPT | Performed by: NURSE PRACTITIONER

## 2023-09-19 PROCEDURE — 82570 ASSAY OF URINE CREATININE: CPT | Performed by: NURSE PRACTITIONER

## 2023-09-19 PROCEDURE — 82043 UR ALBUMIN QUANTITATIVE: CPT | Performed by: NURSE PRACTITIONER

## 2023-09-19 PROCEDURE — 3074F SYST BP LT 130 MM HG: CPT | Performed by: NURSE PRACTITIONER

## 2023-09-19 RX ORDER — EZETIMIBE 10 MG/1
10 TABLET ORAL DAILY
COMMUNITY
Start: 2022-12-21

## 2023-09-19 RX ORDER — PITAVASTATIN CALCIUM 1.04 MG/1
1 TABLET, FILM COATED ORAL DAILY
COMMUNITY
Start: 2023-06-06

## 2023-09-19 RX ORDER — INSULIN DEGLUDEC INJECTION 100 U/ML
3 INJECTION, SOLUTION SUBCUTANEOUS NIGHTLY
COMMUNITY
End: 2023-09-19 | Stop reason: ALTCHOICE

## 2023-09-20 NOTE — PROGRESS NOTES
Protein in the urine is much worse. We need to increase the farxiga to 10 mg daily for full renal protection. Pt to f/u with PCP regarding vaginal sx. Pt can double current dose and then I can send new rx for 10 mg dose to pharmacy to fill when she runs out.

## 2023-09-21 ENCOUNTER — OFFICE VISIT (OUTPATIENT)
Dept: FAMILY MEDICINE | Age: 63
End: 2023-09-21

## 2023-09-21 VITALS
OXYGEN SATURATION: 97 % | SYSTOLIC BLOOD PRESSURE: 107 MMHG | DIASTOLIC BLOOD PRESSURE: 72 MMHG | WEIGHT: 176.48 LBS | HEART RATE: 70 BPM | HEIGHT: 64 IN | BODY MASS INDEX: 30.13 KG/M2

## 2023-09-21 DIAGNOSIS — I10 BENIGN ESSENTIAL HTN: ICD-10-CM

## 2023-09-21 DIAGNOSIS — Z79.4 TYPE 2 DIABETES MELLITUS WITHOUT COMPLICATION, WITH LONG-TERM CURRENT USE OF INSULIN (CMD): ICD-10-CM

## 2023-09-21 DIAGNOSIS — Z01.411 ENCOUNTER FOR GYNECOLOGICAL EXAMINATION WITH ABNORMAL FINDING: ICD-10-CM

## 2023-09-21 DIAGNOSIS — Z95.1 S/P CABG X 4: ICD-10-CM

## 2023-09-21 DIAGNOSIS — N76.0 ACUTE VAGINITIS: Primary | ICD-10-CM

## 2023-09-21 DIAGNOSIS — Z23 FLU VACCINE NEED: ICD-10-CM

## 2023-09-21 DIAGNOSIS — E11.9 TYPE 2 DIABETES MELLITUS WITHOUT COMPLICATION, WITH LONG-TERM CURRENT USE OF INSULIN (CMD): ICD-10-CM

## 2023-09-21 DIAGNOSIS — E78.2 MIXED HYPERLIPIDEMIA: ICD-10-CM

## 2023-09-21 PROCEDURE — 81513 NFCT DS BV RNA VAG FLU ALG: CPT | Performed by: INTERNAL MEDICINE

## 2023-09-21 PROCEDURE — 87491 CHLMYD TRACH DNA AMP PROBE: CPT | Performed by: INTERNAL MEDICINE

## 2023-09-21 PROCEDURE — 90471 IMMUNIZATION ADMIN: CPT | Performed by: FAMILY MEDICINE

## 2023-09-21 PROCEDURE — 87481 CANDIDA DNA AMP PROBE: CPT | Performed by: INTERNAL MEDICINE

## 2023-09-21 PROCEDURE — 88175 CYTOPATH C/V AUTO FLUID REDO: CPT | Performed by: INTERNAL MEDICINE

## 2023-09-21 PROCEDURE — 87591 N.GONORRHOEAE DNA AMP PROB: CPT | Performed by: INTERNAL MEDICINE

## 2023-09-21 PROCEDURE — 90686 IIV4 VACC NO PRSV 0.5 ML IM: CPT | Performed by: FAMILY MEDICINE

## 2023-09-21 PROCEDURE — 3074F SYST BP LT 130 MM HG: CPT | Performed by: FAMILY MEDICINE

## 2023-09-21 PROCEDURE — 99396 PREV VISIT EST AGE 40-64: CPT | Performed by: FAMILY MEDICINE

## 2023-09-21 PROCEDURE — 3078F DIAST BP <80 MM HG: CPT | Performed by: FAMILY MEDICINE

## 2023-09-21 PROCEDURE — 87661 TRICHOMONAS VAGINALIS AMPLIF: CPT | Performed by: INTERNAL MEDICINE

## 2023-09-21 RX ORDER — FLUCONAZOLE 150 MG/1
150 TABLET ORAL DAILY
Qty: 3 TABLET | Refills: 0 | Status: SHIPPED | OUTPATIENT
Start: 2023-09-21 | End: 2023-09-24

## 2023-09-21 ASSESSMENT — PATIENT HEALTH QUESTIONNAIRE - PHQ9
1. LITTLE INTEREST OR PLEASURE IN DOING THINGS: NOT AT ALL
CLINICAL INTERPRETATION OF PHQ2 SCORE: NO FURTHER SCREENING NEEDED
SUM OF ALL RESPONSES TO PHQ9 QUESTIONS 1 AND 2: 0
2. FEELING DOWN, DEPRESSED OR HOPELESS: NOT AT ALL
SUM OF ALL RESPONSES TO PHQ9 QUESTIONS 1 AND 2: 0

## 2023-09-21 ASSESSMENT — ENCOUNTER SYMPTOMS
PSYCHIATRIC NEGATIVE: 1
CONSTITUTIONAL NEGATIVE: 1
GASTROINTESTINAL NEGATIVE: 1
RESPIRATORY NEGATIVE: 1
NEUROLOGICAL NEGATIVE: 1

## 2023-09-22 LAB
BACTERIAL VAGINOSIS VAG-IMP: NOT DETECTED
C ALBICANS DNA VAG QL NAA+PROBE: POSITIVE
C GLABRATA DNA VAG QL NAA+PROBE: NOT DETECTED
C TRACH RRNA SPEC QL NAA+PROBE: NEGATIVE
Lab: NORMAL
N GONORRHOEA RRNA SPEC QL NAA+PROBE: NEGATIVE
SERVICE CMNT-IMP: ABNORMAL
T VAGINALIS DNA VAG QL NAA+PROBE: NOT DETECTED

## 2023-09-23 LAB — HOLD SPECIMEN: NORMAL

## 2023-09-26 LAB
CASE RPRT: NORMAL
CLINICAL INFO: NORMAL
CYTOLOGY CVX/VAG STUDY: NORMAL
PAP EDUCATIONAL NOTE: NORMAL
SPECIMEN ADEQUACY: NORMAL

## 2023-10-10 ENCOUNTER — APPOINTMENT (OUTPATIENT)
Dept: FAMILY MEDICINE | Age: 63
End: 2023-10-10

## 2023-10-11 ENCOUNTER — TELEPHONE (OUTPATIENT)
Dept: ENDOCRINOLOGY CLINIC | Facility: CLINIC | Age: 63
End: 2023-10-11

## 2023-10-11 NOTE — TELEPHONE ENCOUNTER
Please notify pt I spoke with Dr. Jeri Palm regarding pt hx of pancreatitis and starting ozempic. As long as pt is aware of risk of recurrent pancreatitis we can start ozempic and go slow on dose titration.  If pt would like to proceed I can send rx to pharmacy - please let me know

## 2023-10-11 NOTE — TELEPHONE ENCOUNTER
Call with patient, she would like to proceed with ozempic. Informed pt of the normal titration when starting ozempic, but wanted to confirm with Maxwell Holt if the titration would be slower due to hx of pancreatitis. Will pend. Informed pt we will let her know tomorrow.

## 2023-10-12 ENCOUNTER — TELEPHONE (OUTPATIENT)
Dept: ENDOCRINOLOGY CLINIC | Facility: CLINIC | Age: 63
End: 2023-10-12

## 2023-10-12 RX ORDER — SEMAGLUTIDE 0.68 MG/ML
0.25 INJECTION, SOLUTION SUBCUTANEOUS WEEKLY
Qty: 3 ML | Refills: 12 | Status: SHIPPED | OUTPATIENT
Start: 2023-10-12

## 2023-10-12 NOTE — TELEPHONE ENCOUNTER
Thank you, lets proceed with 0.25 mg weeky and schedule f/u in 4 weeks - virtual ok to check on tolerance before titrating up. Stop medication if abdominal pain, vomiting and notify office.

## 2023-10-12 NOTE — TELEPHONE ENCOUNTER
Started PA for Ozempic 0.25/0.5mg through cover my meds     Erlinda Uribe ([facility]) - [phone]  Ozempic (0.25 or 0.5 MG/DOSE) 2MG/3ML pen-injectors  Status: Sent To PlanCreated: October 12th, [phone]: October 12th, 2023  [address].

## 2023-10-23 RX ORDER — SEMAGLUTIDE 0.68 MG/ML
0.5 INJECTION, SOLUTION SUBCUTANEOUS WEEKLY
Qty: 3 ML | Refills: 0 | Status: SHIPPED | OUTPATIENT
Start: 2023-10-23

## 2023-10-23 NOTE — TELEPHONE ENCOUNTER
Requested Prescriptions     Pending Prescriptions Disp Refills    semaglutide (OZEMPIC, 0.25 OR 0.5 MG/DOSE,) 2 MG/3ML Subcutaneous Solution Pen-injector 3 mL 12     Sig: Inject 0.25 mg into the skin once a week.      Patient [facility] med in [facility] please sign for patient to  last pen there left

## 2023-10-23 NOTE — TELEPHONE ENCOUNTER
Patient called in stating PA still needed spoke to [facility] pharmacist states PA not needed called patient back and informed patient she may need to look at 2-3 other local pharmacy other than [facility] and laura

## 2023-10-23 NOTE — TELEPHONE ENCOUNTER
Orders Placed This Encounter      semaglutide (OZEMPIC, 0.25 OR 0.5 MG/DOSE,) 2 MG/3ML Subcutaneous Solution Pen-injector          Sig: Inject 0.5 mg into the skin once a week.           Dispense:  3 mL          Refill:  0

## 2023-11-08 ENCOUNTER — TELEPHONE (OUTPATIENT)
Dept: ENDOCRINOLOGY CLINIC | Facility: CLINIC | Age: 63
End: 2023-11-08

## 2023-11-08 DIAGNOSIS — Z79.4 TYPE 2 DIABETES MELLITUS WITH HYPERGLYCEMIA, WITH LONG-TERM CURRENT USE OF INSULIN (HCC): ICD-10-CM

## 2023-11-08 DIAGNOSIS — E11.65 TYPE 2 DIABETES MELLITUS WITH HYPERGLYCEMIA, WITH LONG-TERM CURRENT USE OF INSULIN (HCC): ICD-10-CM

## 2023-11-08 RX ORDER — PROCHLORPERAZINE 25 MG/1
1 SUPPOSITORY RECTAL
Qty: 1 EACH | Refills: 2 | Status: SHIPPED | OUTPATIENT
Start: 2023-11-08

## 2023-11-08 RX ORDER — PROCHLORPERAZINE 25 MG/1
1 SUPPOSITORY RECTAL
Qty: 9 EACH | Refills: 3 | Status: SHIPPED | OUTPATIENT
Start: 2023-11-08

## 2023-11-08 NOTE — TELEPHONE ENCOUNTER
CGM reviewed,  mg/dl with 2% hypoglycemia. Pt appears to be responding very well to ozempic but has a hx of pancreatitis therefore we wanted to titrate slowly. I see the confusion as the rx was changed to a different pharmacy. Pt should have done 0.25 mg x4 weeks then titrated up. Please inform her of the following med changes:       Toujeo 45 units daily -->reduce to 30 units daily  Farxiga --> resume 10 mg daily  Ozempic 0.5mg --> reduce to 0.25 mg weekly x 4 weeks then increase to 0.5 mg weekly    If pt continues to have hypoglycemia with insulin at lower dose (2 readings <80 mg/dl in 1 week) call the office

## 2023-11-08 NOTE — TELEPHONE ENCOUNTER
Pt called office to report low blood sugar. She started Ozempic 3 weeks ago and today should have been her 3rd dose. She reports having low blood sugars multiple times over the last 2 weeks. Pt did not do fingerstick to compare reading to [facility] but states she did not feel the hypoglycemia like she normally does. Pt encouraged to do fingerstick if she gets a future low alarm to confirm reading. Pt has not taken Toujeo in 3 nights due to the consistent low BS. She also states Marija Barnett increased her Alvaro Garden to 10mg a little over a month ago but she went back to 5mg 1 week ago again due to the lows. Pt states  she was told at office she would be stating the .25 dose of Ozempic but it was prescribed the .5mg when she picked up and assumed Marija Barnett changed her  mind on dose. Current Diabetes Meds confirmed: Toujeo 45 units daily - hasn't taken for 3 days  Farxiga 10mg  pt decreased back to  5mg 1 week ago. Ozempic .5mg taken two doses and is due for another dose today. Pt on Dexcom but she has not been steaming since January because she was not logged into [employer] [employer]. Pt tried resetting password but says she was not getting email. Pt is calling Dexcom customer service and will let me know when they have resolved issue so reports can be sent to provider.

## 2023-11-08 NOTE — TELEPHONE ENCOUNTER
Spoke to patient and relayed the  medication changes. Toujeo 45 units daily -->reduce to 30 units daily  Farxiga --> resume 10 mg daily  Ozempic 0.5mg --> reduce to 0.25 mg weekly x 4 weeks then increase to 0.5 mg weekly     If pt continues to have hypoglycemia with insulin at lower dose (2 readings <80 mg/dl in 1 week) call the office    Pt verbalized understanding and agreed to plan. Pt has appt next week and ask if she should keep it. I told her yes but double checking with provider she should still come in? She also requested Dexcom Sensor and Transmitters need to be ordered. Sent to [facility] as requested.

## 2023-12-19 ENCOUNTER — OFFICE VISIT (OUTPATIENT)
Dept: ENDOCRINOLOGY CLINIC | Facility: CLINIC | Age: 63
End: 2023-12-19
Payer: COMMERCIAL

## 2023-12-19 VITALS
SYSTOLIC BLOOD PRESSURE: 126 MMHG | RESPIRATION RATE: 16 BRPM | HEART RATE: 76 BPM | HEIGHT: 65.75 IN | TEMPERATURE: 98 F | DIASTOLIC BLOOD PRESSURE: 62 MMHG | OXYGEN SATURATION: 96 % | BODY MASS INDEX: 28.4 KG/M2 | WEIGHT: 174.63 LBS

## 2023-12-19 DIAGNOSIS — R31.9 HEMATURIA, UNSPECIFIED TYPE: ICD-10-CM

## 2023-12-19 DIAGNOSIS — E11.65 TYPE 2 DIABETES MELLITUS WITH HYPERGLYCEMIA, WITH LONG-TERM CURRENT USE OF INSULIN (HCC): Primary | ICD-10-CM

## 2023-12-19 DIAGNOSIS — Z79.4 TYPE 2 DIABETES MELLITUS WITH HYPERGLYCEMIA, WITH LONG-TERM CURRENT USE OF INSULIN (HCC): Primary | ICD-10-CM

## 2023-12-19 LAB
BILIRUB UR QL STRIP.AUTO: NEGATIVE
BILIRUBIN: NEGATIVE
CARTRIDGE LOT#: 114 NUMERIC
COLOR UR AUTO: YELLOW
GLUCOSE (URINE DIPSTICK): 500 MG/DL
GLUCOSE UR STRIP.AUTO-MCNC: >1000 MG/DL
HEMOGLOBIN A1C: 7.4 % (ref 4.3–5.6)
KETONES (URINE DIPSTICK): NEGATIVE MG/DL
KETONES UR STRIP.AUTO-MCNC: NEGATIVE MG/DL
LEUKOCYTE ESTERASE UR QL STRIP.AUTO: NEGATIVE
LEUKOCYTES: NEGATIVE
MULTISTIX LOT#: ABNORMAL NUMERIC
NITRITE UR QL STRIP.AUTO: NEGATIVE
NITRITE, URINE: POSITIVE
OCCULT BLOOD: NEGATIVE
PH UR STRIP.AUTO: 5 [PH] (ref 5–8)
PH, URINE: 6 (ref 4.5–8)
PROTEIN (URINE DIPSTICK): 30 MG/DL
RBC UR QL AUTO: NEGATIVE
SP GR UR STRIP.AUTO: 1.03 (ref 1–1.03)
SPECIFIC GRAVITY: 1.02 (ref 1–1.03)
URINE-COLOR: YELLOW
UROBILINOGEN UR STRIP.AUTO-MCNC: NORMAL MG/DL
UROBILINOGEN,SEMI-QN: 0.2 MG/DL (ref 0–1.9)

## 2023-12-19 PROCEDURE — 3074F SYST BP LT 130 MM HG: CPT | Performed by: NURSE PRACTITIONER

## 2023-12-19 PROCEDURE — 3008F BODY MASS INDEX DOCD: CPT | Performed by: NURSE PRACTITIONER

## 2023-12-19 PROCEDURE — 3078F DIAST BP <80 MM HG: CPT | Performed by: NURSE PRACTITIONER

## 2023-12-19 PROCEDURE — 83036 HEMOGLOBIN GLYCOSYLATED A1C: CPT | Performed by: NURSE PRACTITIONER

## 2023-12-19 PROCEDURE — 99214 OFFICE O/P EST MOD 30 MIN: CPT | Performed by: NURSE PRACTITIONER

## 2023-12-19 PROCEDURE — 81001 URINALYSIS AUTO W/SCOPE: CPT | Performed by: NURSE PRACTITIONER

## 2023-12-19 PROCEDURE — 81003 URINALYSIS AUTO W/O SCOPE: CPT | Performed by: NURSE PRACTITIONER

## 2023-12-19 RX ORDER — ACYCLOVIR 400 MG/1
1 TABLET ORAL
Qty: 3 EACH | Refills: 5 | Status: SHIPPED | OUTPATIENT
Start: 2024-02-19

## 2024-01-08 ENCOUNTER — HOSPITAL ENCOUNTER (OUTPATIENT)
Dept: LAB | Facility: HOSPITAL | Age: 64
Discharge: HOME | End: 2024-01-08
Attending: INTERNAL MEDICINE
Payer: COMMERCIAL

## 2024-01-08 DIAGNOSIS — Z79.4 TYPE 2 DIABETES MELLITUS WITH HYPERGLYCEMIA, WITH LONG-TERM CURRENT USE OF INSULIN (HCC): ICD-10-CM

## 2024-01-08 DIAGNOSIS — E11.65 TYPE 2 DIABETES MELLITUS WITH HYPERGLYCEMIA, WITH LONG-TERM CURRENT USE OF INSULIN (HCC): ICD-10-CM

## 2024-01-08 LAB
ALBUMIN SERPL-MCNC: 3.4 G/DL (ref 3.4–5)
ALBUMIN/GLOB SERPL: 0.8 {RATIO} (ref 1–2)
ALP LIVER SERPL-CCNC: 66 U/L
ALT SERPL-CCNC: 20 U/L
ANION GAP SERPL CALC-SCNC: 4 MMOL/L (ref 0–18)
AST SERPL-CCNC: 15 U/L (ref 15–37)
BILIRUB SERPL-MCNC: 0.2 MG/DL (ref 0.1–2)
BUN BLD-MCNC: 22 MG/DL (ref 9–23)
CALCIUM BLD-MCNC: 8.8 MG/DL (ref 8.5–10.1)
CHLORIDE SERPL-SCNC: 115 MMOL/L (ref 98–112)
CHOLEST SERPL-MCNC: 164 MG/DL (ref ?–200)
CO2 SERPL-SCNC: 23 MMOL/L (ref 21–32)
CREAT BLD-MCNC: 0.78 MG/DL
CREAT UR-SCNC: 148 MG/DL
EGFRCR SERPLBLD CKD-EPI 2021: 85 ML/MIN/1.73M2 (ref 60–?)
EST. AVERAGE GLUCOSE BLD GHB EST-MCNC: 166 MG/DL (ref 68–126)
FASTING PATIENT LIPID ANSWER: YES
FASTING STATUS PATIENT QL REPORTED: YES
GLOBULIN PLAS-MCNC: 4.2 G/DL (ref 2.8–4.4)
GLUCOSE BLD-MCNC: 117 MG/DL (ref 70–99)
HBA1C MFR BLD: 7.4 % (ref ?–5.7)
HDLC SERPL-MCNC: 51 MG/DL (ref 40–59)
LDLC SERPL CALC-MCNC: 95 MG/DL (ref ?–100)
MICROALBUMIN UR-MCNC: 16.2 MG/DL
MICROALBUMIN/CREAT 24H UR-RTO: 109.5 UG/MG (ref ?–30)
NONHDLC SERPL-MCNC: 113 MG/DL (ref ?–130)
OSMOLALITY SERPL CALC.SUM OF ELEC: 298 MOSM/KG (ref 275–295)
POTASSIUM SERPL-SCNC: 4.3 MMOL/L (ref 3.5–5.1)
PROT SERPL-MCNC: 7.6 G/DL (ref 6.4–8.2)
SODIUM SERPL-SCNC: 142 MMOL/L (ref 136–145)
TRIGL SERPL-MCNC: 98 MG/DL (ref 30–149)
VLDLC SERPL CALC-MCNC: 16 MG/DL (ref 0–30)

## 2024-01-08 PROCEDURE — 83036 HEMOGLOBIN GLYCOSYLATED A1C: CPT

## 2024-01-08 PROCEDURE — 82043 UR ALBUMIN QUANTITATIVE: CPT

## 2024-01-08 PROCEDURE — 80053 COMPREHEN METABOLIC PANEL: CPT

## 2024-01-08 PROCEDURE — 36415 COLL VENOUS BLD VENIPUNCTURE: CPT

## 2024-01-08 PROCEDURE — 82570 ASSAY OF URINE CREATININE: CPT

## 2024-01-08 PROCEDURE — 80061 LIPID PANEL: CPT

## 2024-01-30 DIAGNOSIS — Z79.4 TYPE 2 DIABETES MELLITUS WITH HYPERGLYCEMIA, WITH LONG-TERM CURRENT USE OF INSULIN (HCC): ICD-10-CM

## 2024-01-30 DIAGNOSIS — E11.65 TYPE 2 DIABETES MELLITUS WITH HYPERGLYCEMIA, WITH LONG-TERM CURRENT USE OF INSULIN (HCC): ICD-10-CM

## 2024-01-30 DIAGNOSIS — E11.42 DIABETIC PERIPHERAL NEUROPATHY (HCC): ICD-10-CM

## 2024-01-30 RX ORDER — PREGABALIN 50 MG/1
50 CAPSULE ORAL 3 TIMES DAILY
Qty: 90 CAPSULE | Refills: 0 | OUTPATIENT
Start: 2024-01-30

## 2024-01-30 RX ORDER — PREGABALIN 50 MG/1
50 CAPSULE ORAL 3 TIMES DAILY
Qty: 90 CAPSULE | Refills: 2 | Status: SHIPPED | OUTPATIENT
Start: 2024-01-30

## 2024-01-30 NOTE — TELEPHONE ENCOUNTER
Called pt - scheduled 3/05  Your appointments       Date & Time Appointment Department (Center)    Mar 05, 2024  8:15 AM CST [facility] follow up with Beth Luong APRN [facility], [address] [place] [address] ([facility] [place])              [facility], [address] [place] [address]  [facility] [place]  [address] [place] [id number] [phone]

## 2024-01-30 NOTE — TELEPHONE ENCOUNTER
Requested Prescriptions     Pending Prescriptions Disp Refills    PREGABALIN 50 MG Oral Cap [Pharmacy Med Name: PREGABALIN 50 MG CAPSULE] 90 capsule 0     Sig: TAKE 1 CAPSULE BY MOUTH 3 TIMES DAILY.       Last A1c value was 7.4% done 1/8/2024.    Refill 8/15/23  LOV 12/19/23  No FU

## 2024-02-01 ENCOUNTER — NURSE TRIAGE (OUTPATIENT)
Dept: TELEHEALTH | Age: 64
End: 2024-02-01

## 2024-02-01 ENCOUNTER — EXTERNAL RECORD (OUTPATIENT)
Dept: HEALTH INFORMATION MANAGEMENT | Facility: OTHER | Age: 64
End: 2024-02-01

## 2024-02-02 ENCOUNTER — TELEPHONE (OUTPATIENT)
Dept: ENDOCRINOLOGY CLINIC | Facility: CLINIC | Age: 64
End: 2024-02-02

## 2024-02-02 ENCOUNTER — APPOINTMENT (OUTPATIENT)
Dept: FAMILY MEDICINE | Age: 64
End: 2024-02-02

## 2024-02-02 NOTE — TELEPHONE ENCOUNTER
Message for Marla went to er at [place] yesterday I  do have  pancreatis my pace was 2900. Last night it went down to 1799 this morning it went down to  1726. They are giving me fluids at 100 an hour and antibiotics. They did a ct scan and they saw that the  pancreas was inflamed and that is why they are giving me antibiotics. They are keeping me till up to Monday . Give me a call at your earliest convince.

## 2024-02-02 NOTE — TELEPHONE ENCOUNTER
MC message sent to pt to be sure she discontinues ozempic and call for f/u once out of hospital to discuss alternative med options. Ozempic removed from med list and placed on allergies as pt has had pancreatitis more than once.

## 2024-02-03 ENCOUNTER — EXTERNAL RECORD (OUTPATIENT)
Dept: HEALTH INFORMATION MANAGEMENT | Facility: OTHER | Age: 64
End: 2024-02-03

## 2024-02-04 NOTE — PROGRESS NOTES
2nd Attempt at scheduling & DM Follow Up Questions             Claudine Restrepo MD Family Medicine, IP Consult to Primary Care Schedule an appointment as soon as possible for a visit in 1 week  [address] 26YO F presenting with nausea, abdominal pain, and inability to tolerate solid foods after a recent trip to [place]. Found to have hepatosplenomegaly, elevated LFTs, and hemolytic anemia, improving, deemed likely secondary to infectious process.     Problem List/Main Diagnoses (system-based):     1. Hepatosplenomegaly  #Hemolytic Anemia  #Transaminitis   #Mild Lymphocytosis   recent travel to [place]  splenomegaly on ct & us  negative SILVERIO, (-) IgG on angela, T&S/autoantibody screen negative, unlikely autoimmune  iron studies wnl, infectious workup negative thus far  PBS: rare schistocytes, activated LGLs present, few giant platelets, rare acanthocytes; smudge cells present  labwork & clinical presentation improving, likely viral/other infectious process    pending workup:  - peripheral blood flow cytometry   - urine hemosiderin  - TSH  - lipid panel  - dengue   - leptospira    2. Anxiety  home medication(s): lexapro 20mg qd    - continue home medication(s)  - outpatient follow up w PCP    Physical Exam at time of Discharge:  Constitutional: NAD, comfortable in bed.  HEENT: NC/AT, PERRLA, EOMI, no conjunctival pallor or scleral icterus, MMM  Neck: Supple, no JVD  Respiratory: CTA B/L. No w/r/r.   Cardiovascular: RRR, normal S1 and S2, no m/r/g.   Gastrointestinal: +BS, soft, no distension; LUQ TTP. Mildly tender in epigastric region. Bowel sounds present   Extremities: wwp; no cyanosis, clubbing or edema.   Vascular: Pulses equal and strong throughout.   Neurological: AAOx3, no CN deficits, strength and sensation intact throughout.   Skin: No gross skin abnormalities or rashes

## 2024-02-15 ENCOUNTER — APPOINTMENT (OUTPATIENT)
Dept: FAMILY MEDICINE | Age: 64
End: 2024-02-15

## 2024-02-15 VITALS
OXYGEN SATURATION: 99 % | BODY MASS INDEX: 28.79 KG/M2 | DIASTOLIC BLOOD PRESSURE: 75 MMHG | HEIGHT: 64 IN | HEART RATE: 83 BPM | WEIGHT: 168.65 LBS | SYSTOLIC BLOOD PRESSURE: 123 MMHG

## 2024-02-15 DIAGNOSIS — Z95.1 S/P CABG X 4: ICD-10-CM

## 2024-02-15 DIAGNOSIS — E11.9 TYPE 2 DIABETES MELLITUS WITHOUT COMPLICATION, WITH LONG-TERM CURRENT USE OF INSULIN (CMD): ICD-10-CM

## 2024-02-15 DIAGNOSIS — I10 BENIGN ESSENTIAL HTN: ICD-10-CM

## 2024-02-15 DIAGNOSIS — I25.10 ATHEROSCLEROSIS OF NATIVE CORONARY ARTERY OF NATIVE HEART WITHOUT ANGINA PECTORIS: ICD-10-CM

## 2024-02-15 DIAGNOSIS — Z09 HOSPITAL DISCHARGE FOLLOW-UP: Primary | ICD-10-CM

## 2024-02-15 DIAGNOSIS — Z87.19 HISTORY OF PANCREATITIS: ICD-10-CM

## 2024-02-15 DIAGNOSIS — Z79.4 TYPE 2 DIABETES MELLITUS WITHOUT COMPLICATION, WITH LONG-TERM CURRENT USE OF INSULIN (CMD): ICD-10-CM

## 2024-02-15 DIAGNOSIS — E78.2 MIXED HYPERLIPIDEMIA: ICD-10-CM

## 2024-02-15 PROBLEM — Z72.0 TOBACCO ABUSE: Status: ACTIVE | Noted: 2024-02-15

## 2024-02-15 LAB
ALBUMIN SERPL-MCNC: 150 G/DL
ALBUMIN/CREAT UR-RTO: NORMAL MG/G
CREAT UR-MCNC: 100 MG/DL

## 2024-02-15 PROCEDURE — 99406 BEHAV CHNG SMOKING 3-10 MIN: CPT | Performed by: FAMILY MEDICINE

## 2024-02-15 PROCEDURE — 82043 UR ALBUMIN QUANTITATIVE: CPT | Performed by: FAMILY MEDICINE

## 2024-02-15 PROCEDURE — 99214 OFFICE O/P EST MOD 30 MIN: CPT | Performed by: FAMILY MEDICINE

## 2024-02-15 ASSESSMENT — ENCOUNTER SYMPTOMS
PSYCHIATRIC NEGATIVE: 1
NEUROLOGICAL NEGATIVE: 1
GASTROINTESTINAL NEGATIVE: 1
RESPIRATORY NEGATIVE: 1
CONSTITUTIONAL NEGATIVE: 1

## 2024-03-01 ENCOUNTER — OFFICE VISIT (OUTPATIENT)
Dept: ENDOCRINOLOGY CLINIC | Facility: CLINIC | Age: 64
End: 2024-03-01
Payer: COMMERCIAL

## 2024-03-01 VITALS
WEIGHT: 172 LBS | DIASTOLIC BLOOD PRESSURE: 68 MMHG | OXYGEN SATURATION: 98 % | RESPIRATION RATE: 20 BRPM | SYSTOLIC BLOOD PRESSURE: 118 MMHG | HEART RATE: 63 BPM | BODY MASS INDEX: 28 KG/M2

## 2024-03-01 DIAGNOSIS — Z79.4 TYPE 2 DIABETES MELLITUS WITH MICROALBUMINURIA, WITH LONG-TERM CURRENT USE OF INSULIN (HCC): Primary | ICD-10-CM

## 2024-03-01 DIAGNOSIS — R80.9 TYPE 2 DIABETES MELLITUS WITH MICROALBUMINURIA, WITH LONG-TERM CURRENT USE OF INSULIN (HCC): Primary | ICD-10-CM

## 2024-03-01 DIAGNOSIS — E11.29 TYPE 2 DIABETES MELLITUS WITH MICROALBUMINURIA, WITH LONG-TERM CURRENT USE OF INSULIN (HCC): Primary | ICD-10-CM

## 2024-03-01 PROCEDURE — 99214 OFFICE O/P EST MOD 30 MIN: CPT | Performed by: NURSE PRACTITIONER

## 2024-03-01 PROCEDURE — 95251 CONT GLUC MNTR ANALYSIS I&R: CPT | Performed by: NURSE PRACTITIONER

## 2024-03-01 RX ORDER — INSULIN GLARGINE 300 U/ML
25 INJECTION, SOLUTION SUBCUTANEOUS DAILY
Qty: 9 ML | Refills: 2 | Status: SHIPPED | OUTPATIENT
Start: 2024-03-01

## 2024-03-01 NOTE — PROGRESS NOTES
Chief Complaint   Patient presents with    Diabetes     F/U - streaming dexcom     HPI:   Sylvia Melo is a 63 year old female who presents for a follow up visit for management of diabetes. Last A1c value was 7.4% done 2024. Since last visit diabetes management has been reasonably well controlled. Pt did have to stop ozempic due to repeat pancreatitis.     Diabetes history:  Type: 2  Onset:   Pt has not been admitted to the hospital for blood sugars.   Pt does have a hx of pancreatitis ,  and     Current DM regimen:  Farxiga 10 m tab daily in am  Toujeo 30 units nightly    Previous DM therapies:  Metformin HCL - severe nausea and diarrhea  Metformin ER - neuroapthy pain improved off med  Januvia - pancreatitis ( and )  Ozempic - pancreatitis ()  Toujeo - improving glucose control   Jardiance (10 mg)- yeast infections  Novolog - hypoglycemia      Complications/Co-morbidities:   History of pancreatitis, Proteinuria, CAD, CABG x4 21 - Grade 1 diastolic dysfunction on echo     Modifying factors:  Medication adherence: yes  Recent illness/steroids: no    Overall glucose control:   HGBA1C:    Lab Results   Component Value Date    A1C 7.4 (H) 2024    A1C 7.4 (A) 2023    A1C 7.2 (A) 2023     (H) 2024     Personal  Dexcom G6 CGM  Analysis of data: 24-3/1/24  Active wear time: 100% (Goal 70%)  Sensor download: full report  in media  Average glucose : 158 mg/dl   GMI: 7.1%     CV (coefficient of variation) : 26.3%      26% (last visit  9%) time above 180mg /dl (Goal < 25%)  2% (last visit  1%) time above 250 mg/dl (Goal < 5%)  72% (last visit  82%) time in target range:  mg/dl (Goal > 70%)  0% (last visit  8%) time below target range: 70mg/dl (Goal < 4%)     Evaluation   1. Baseline glucose in target range  2. dinner postprandial elevation with return to baseline  3. low frequency of hypoglycemia           Wt Readings from Last 3  Encounters:   03/01/24 172 lb (78 kg)   12/19/23 174 lb 9.6 oz (79.2 kg)   09/19/23 177 lb 3.2 oz (80.4 kg)     BP Readings from Last 3 Encounters:   03/01/24 118/68   12/19/23 126/62   09/19/23 116/62          Past History:   She  has a past medical history of Back problem, Diabetes (HCC), High blood pressure, High cholesterol, Pancreatitis (HCC), and Visual impairment.   Her family history includes Diabetes in her maternal grandmother; Hypertension in her father and mother.   She  reports that she quit smoking about 2 years ago. Her smoking use included cigarettes. She has never used smokeless tobacco. She reports current alcohol use. She reports that she does not use drugs.     She is allergic to ozempic (0.25 or 0.5 mg-dose) [semaglutide] and seasonal.     Current Outpatient Medications on File Prior to Visit   Medication Sig    pregabalin 50 MG Oral Cap Take 1 capsule (50 mg total) by mouth 3 (three) times daily.    Continuous Blood Gluc Sensor (DEXCOM G7 SENSOR) Does not apply Misc 1 each Every 10 days. Start when current transmitter on G6 expires    Continuous Blood Gluc Sensor (DEXCOM G6 SENSOR) Does not apply Misc 1 each Every 10 days.    Continuous Blood Gluc Transmit (DEXCOM G6 TRANSMITTER) Does not apply Misc 1 each every 3 (three) months.    dapagliflozin 10 MG Oral Tab Take 1 tablet (10 mg total) by mouth daily.    ezetimibe 10 MG Oral Tab Take 1 tablet (10 mg total) by mouth daily.    LIVALO 1 MG Oral Tab Take 1 mg by mouth daily.    Insulin Pen Needle (BD PEN NEEDLE MARYSE U/F) 32G X 4 MM Does not apply Misc Daily with insulin pen    pantoprazole 40 MG Oral Tab EC Take 1 tablet (40 mg total) by mouth every morning before breakfast.    REPATHA SURECLICK 140 MG/ML Subcutaneous Solution Auto-injector     clopidogrel 75 MG Oral Tab Take 1 tablet (75 mg total) by mouth daily.    losartan 25 MG Oral Tab Take 1 tablet (25 mg total) by mouth daily.    metoprolol tartrate 25 MG Oral Tab Take 1 tablet (25 mg  total) by mouth 2x Daily(Beta Blocker).    aspirin 81 MG Oral Tab EC Take one tab daily Start on 1/3/22    ONETOUCH VERIO In Vitro Strip 1 strip by In Vitro route 4 (four) times daily.    ONETOUCH DELICA LANCETS FINE Does not apply Misc Use to check blood sugar 2 times daily     No current facility-administered medications on file prior to visit.         Comprehensive Metabolic Panel     CMP  (most recent labs)   Lab Results   Component Value Date/Time     (H) 01/08/2024 10:43 AM    BUN 22 01/08/2024 10:43 AM    CREATSERUM 0.78 01/08/2024 10:43 AM    GFRNAA 72 04/09/2022 05:52 AM    GFRAA 83 04/09/2022 05:52 AM    EGFRCR 85 01/08/2024 10:43 AM    CA 8.8 01/08/2024 10:43 AM    ALKPHO 66 01/08/2024 10:43 AM    AST 15 01/08/2024 10:43 AM    ALT 20 01/08/2024 10:43 AM    BILT 0.2 01/08/2024 10:43 AM    TP 7.6 01/08/2024 10:43 AM    ALB 3.4 01/08/2024 10:43 AM     01/08/2024 10:43 AM    K 4.3 01/08/2024 10:43 AM     (H) 01/08/2024 10:43 AM    CO2 23.0 01/08/2024 10:43 AM             Lipid Panel     Cholesterol: 164, done on 1/8/2024.  HDL Cholesterol: 51, done on 1/8/2024.  LDL Cholesterol: 95, done on 1/8/2024.  TriGlycerides 98, done on 1/8/2024.      Diabetes  (most recent labs)   Lab Results   Component Value Date/Time    A1C 7.4 (H) 01/08/2024 10:43 AM            Microalb (most recent labs)   Lab Results   Component Value Date/Time    MICROALBCREA 109.5 (H) 01/08/2024 10:43 AM            Lab results reviewed with patient.    REVIEW OF SYSTEMS:   Review of Systems  GENERAL HEALTH: feels well otherwise  SKIN: denies any unusual skin lesions or rashes  EYES: denies vision changes  RESPIRATORY: denies shortness of breath with exertion  CARDIOVASCULAR: denies chest pain on exertion  GI: denies abdominal pain, N/V/D  NEURO: denies headaches and reports numbness and tingling to left foot improving    EXAM:   /68   Pulse 63   Resp 20   Wt 172 lb (78 kg)   SpO2 98%   BMI 27.97 kg/m²  Estimated  body mass index is 27.97 kg/m² as calculated from the following:    Height as of 23: 5' 5.75\" (1.67 m).    Weight as of this encounter: 172 lb (78 kg).   Physical Exam  Vitals reviewed  Constitutional: Normal appearance, no acute distress  Pulmonary: Pulmonary effort is normal  Neurologic: Alert and oriented  Psychiatric: Normal mood and affect  Musculoskeletal:  Diabetes foot exam:   Good foot hygiene.   Bilateral barefoot skin diabetic exam: normal.  Visualized feet and the appearance : normal.  Bilateral monofilament/sensation of both feet is normal. Vibration to dorsum to the first toe perceived.   Bilateral 2+ pedal pulse on exam     ASSESSMENT AND PLAN:   As for her Diabetes, it is improved, needs further observation.     Medications:  Continue:   Farxiga 10 m tab daily in am    Change:  Toujeo 30 --> reduce to 25 units nightly      Recommendations are:  Continue dexcom CGM   Reminded BGM 1-2 x daily if not wearing CGM. Reviewed s/s and treatment of hypoglycemia (on AVS)   lose weight by increased dietary compliance and exercise   see ophthalmology soon - states she went in the fall but does not recall name  check feet daily  check labs as ordered    Cardiovascular:  The ASCVD Risk score (Iraj KISER, et al., 2019) failed to calculate for the following reasons:    The patient has a prior MI or stroke diagnosis     As for her hypertension, Blood Pressure is well controlled. Pt is on ace/arb.   PLAN: will continue present medications, reviewed diet, exercise and weight control     As for her cholesterol, Lipids are reasonably well controlled. Pt is not on statin due to severe myalgias. Followed by cardiology and on repatha, livalo and ezetimibe.    PLAN: will continue present medications, reviewed diet, exercise and weight control, and labs as ordered     Patient is on aspirin therapy.     DM Health Maintenance  Nephropathy screening: continue ace /arb rx.   Last dilated eye exam: No data recorded Exam  shows retinopathy? No data recorded  Last diabetic foot exam: Last Foot Exam: 03/01/24      Date of last PHQ-2 depression screen: PHQ-2 - Date of last depression screening: 3/1/2024      Patient  reports that she quit smoking about 2 years ago. Her smoking use included cigarettes. She has never used smokeless tobacco.  When is flu vaccine due? No recommendations at this time  When is pneumonia vaccine due? Pneumococcal Vaccination(1 of 2 - PCV) Never done  Dentist : recommend every 6m     The patient indicates understanding of these issues and agrees to the plan.  Refills sent at time of office visit.    Diagnoses and all orders for this visit:    Type 2 diabetes mellitus with microalbuminuria, with long-term current use of insulin (HCC)  -     Insulin Glargine, 1 Unit Dial, (TOUJEO SOLOSTAR) 300 UNIT/ML Subcutaneous Solution Pen-injector; Inject 25 Units into the skin daily.             Return in about 2 months (around 5/1/2024).    Spent 30 min obtaining patient history, evaluating patient, reviewing blood glucose trends, discussing treatment options, lifestyle modifications and completing documentation -this time does not including sensor interpretation time if applicable.   The risks and benefits of my recommendations, as well as other treatment options were discussed with the patient today. Questions were also answered to the best of my knowledge.    Beth MCDONOUGH

## 2024-03-01 NOTE — PATIENT INSTRUCTIONS
Summary from visit:   Last A1c value was 7.4% done 2024.    No follow-ups on file.    Diabetes Medications:   Continue:   Farxiga 10 m tab daily in am    Change:  Toujeo 30 --> reduce to 25 units nightly    Please call if you have 2 glucose readings less than 80 mg/dl in 1 week so we can adjust your medications.          It is important to take all of your medications as prescribed. Please call me if you cannot get the prescriptions filled or are having issues with refills.   Also, please call me if you have any issues with medication questions, side effects, dosing questions or problems with your blood sugar trends BEFORE CHANGING OR STOPPING ANY MEDICATIONS.    Remember to bring your glucose meter or blood sugar logbook to every appointment here at the diabetes center.   In order for me to determine any patterns in your blood sugars, you will need to test your blood sugar 1 times daily if not on dexcom.   Please Schedule your annual diabetic eye exam and Call with any questions or concerns prior to your next visit.   ---------------------------------------------------------------------------------------------------------------------------------------------------    Reminders:  The A1C:  The A1C test provides us with your average blood sugar for the past 3 months. Keeping an A1C less than 7% for most people helps reduce or delay health problems that are related to diabetes. We sometimes make exceptions based on age, health history and other factors.     Blood sugar targets:  Before breakfast:   (preferably less than 110)  2 hours After meals: less than 180 (preferably less than 150)   Call for persistent blood sugars less than  75 or more than  200.   Blood sugars greater than 200 are not acceptable to reach your goal of improving diabetes      Hypoglycemia:    Watch for low blood sugars: (less than 70)  Symptoms of low blood sugar:   Shakiness or dizziness  Cold, clammy skin or sweating  Feeling  hungry  Headache  Nervousness  A hard, fast heartbeat  Weakness  Confusion or irritability  Blurred eyesight  Having nightmares or waking up confused or sweating  Numbness or tingling in the lips or tongue    Treatment of Low Blood sugar Action Plan  1. Check blood glucose to be sure that it is low. You can’t always go by symptoms. If in doubt, treat your low blood glucose anyway.  2. Take 15 grams of carbohydrate (carb). Here are some choices:  4 oz. regular fruit juice  3-4 glucose tablets  6 oz. regular soda   7-8 jelly beans  3. Recheck blood glucose after 10-15 minutes. If blood glucose is still low (less than 70 mg/dl) repeat the treatment (step 2).  4. If your next meal is more than one hour away, eat a small snack.  5. If you’re not sure what caused your low blood glucose, call your healthcare provider.  6. Always check your blood glucose before you drive           Diabetes Center Refill policy:     Allow 2-3 business days for refills  Contact your pharmacy at least 5 days prior to running out of medication and have them send an electronic request or submit request through the “request refill” option in your [employer] account.  Refills are not addressed after hours or on weekends; covering providers  do not authorize routine medications on weekends.  If your prescription is due for a refill, you may be due for a follow up appointment. This may impact the ability for you to get a 90 supply if requested.   To best provide you care, patients receiving routine medications need to be seen at least twice per year however if the A1C is above 8% you will be need to be seen more frequently.   Yearly blood work may also required for many medications to insure safe prescribing. If you are due for labs, you will have 30 days to complete the  requested labs before future refills are authorized.   In the event that your preferred pharmacy does not have the requested medication in stock (e.g. Backordered), it is your  responsibility to find another pharmacy that has the requested medication available.  We will gladly send a new prescription to that pharmacy at your request.       More and more people are living long and healthy lives with diabetes. We are here to help you manage your diabetes. Let’s work together to make a plan that you can balance in your daily life. Please continue with your primary care physician/provider for your routine health care maintenance.   Thank you,   Beth Luong [facility] [place]

## 2024-05-03 ENCOUNTER — TELEPHONE (OUTPATIENT)
Dept: ENDOCRINOLOGY CLINIC | Facility: CLINIC | Age: 64
End: 2024-05-03

## 2024-05-03 NOTE — TELEPHONE ENCOUNTER
Pt was a no show for visit today. CGM reviewed in anticipation of visit and post prandial elevations quite high. Need to discuss additional medications. Please schedule f/u asap - ok for virtual if needed.     Dexcom:   mg/dl   GMI: 8.3%  TIR: 39%  TBR: <1%    Pt with prolonged post prandial elevation up to the 350 mg/dl range.

## 2024-05-16 ENCOUNTER — APPOINTMENT (OUTPATIENT)
Dept: FAMILY MEDICINE | Age: 64
End: 2024-05-16

## 2024-05-16 VITALS
BODY MASS INDEX: 28.49 KG/M2 | HEIGHT: 64 IN | HEART RATE: 60 BPM | DIASTOLIC BLOOD PRESSURE: 73 MMHG | WEIGHT: 166.89 LBS | SYSTOLIC BLOOD PRESSURE: 126 MMHG | OXYGEN SATURATION: 97 %

## 2024-05-16 DIAGNOSIS — Z95.1 S/P CABG X 4: ICD-10-CM

## 2024-05-16 DIAGNOSIS — E11.9 TYPE 2 DIABETES MELLITUS WITHOUT COMPLICATION, WITH LONG-TERM CURRENT USE OF INSULIN  (CMD): Primary | ICD-10-CM

## 2024-05-16 DIAGNOSIS — E78.2 MIXED HYPERLIPIDEMIA: ICD-10-CM

## 2024-05-16 DIAGNOSIS — I10 BENIGN ESSENTIAL HTN: ICD-10-CM

## 2024-05-16 DIAGNOSIS — Z79.4 TYPE 2 DIABETES MELLITUS WITHOUT COMPLICATION, WITH LONG-TERM CURRENT USE OF INSULIN  (CMD): Primary | ICD-10-CM

## 2024-05-16 LAB — HBA1C MFR BLD: 9.3 % (ref 4.5–5.6)

## 2024-05-16 RX ORDER — INSULIN GLARGINE 300 U/ML
35 INJECTION, SOLUTION SUBCUTANEOUS NIGHTLY
Qty: 3.5 ML | Refills: 3 | Status: SHIPPED | OUTPATIENT
Start: 2024-05-16 | End: 2025-05-16

## 2024-05-16 ASSESSMENT — PATIENT HEALTH QUESTIONNAIRE - PHQ9
1. LITTLE INTEREST OR PLEASURE IN DOING THINGS: NOT AT ALL
SUM OF ALL RESPONSES TO PHQ9 QUESTIONS 1 AND 2: 0
2. FEELING DOWN, DEPRESSED OR HOPELESS: NOT AT ALL
SUM OF ALL RESPONSES TO PHQ9 QUESTIONS 1 AND 2: 0
CLINICAL INTERPRETATION OF PHQ2 SCORE: NO FURTHER SCREENING NEEDED

## 2024-06-27 DIAGNOSIS — Z79.4 TYPE 2 DIABETES MELLITUS WITH HYPERGLYCEMIA, WITH LONG-TERM CURRENT USE OF INSULIN (HCC): ICD-10-CM

## 2024-06-27 DIAGNOSIS — E11.65 TYPE 2 DIABETES MELLITUS WITH HYPERGLYCEMIA, WITH LONG-TERM CURRENT USE OF INSULIN (HCC): ICD-10-CM

## 2024-06-28 NOTE — TELEPHONE ENCOUNTER
Requested Prescriptions     Pending Prescriptions Disp Refills    FARXIGA 5 MG Oral Tab [Pharmacy Med Name: FARXIGA 5 MG TABLET] 90 tablet 3     Sig: TAKE ONE TABLET BY MOUTH DAILY       Last A1c value was 7.4% done 1/8/2024.    Refill 9/20/23  LOV 3/1/24, RTC 2m  No FU

## 2024-07-01 RX ORDER — DAPAGLIFLOZIN 5 MG/1
5 TABLET, FILM COATED ORAL DAILY
Qty: 90 TABLET | Refills: 3 | OUTPATIENT
Start: 2024-07-01

## 2024-07-15 ENCOUNTER — TELEPHONE (OUTPATIENT)
Dept: FAMILY MEDICINE | Age: 64
End: 2024-07-15

## 2024-07-30 DIAGNOSIS — Z79.4 TYPE 2 DIABETES MELLITUS WITH HYPERGLYCEMIA, WITH LONG-TERM CURRENT USE OF INSULIN (HCC): Primary | ICD-10-CM

## 2024-07-30 DIAGNOSIS — E11.65 TYPE 2 DIABETES MELLITUS WITH HYPERGLYCEMIA, WITH LONG-TERM CURRENT USE OF INSULIN (HCC): Primary | ICD-10-CM

## 2024-07-30 RX ORDER — DAPAGLIFLOZIN 10 MG/1
10 TABLET, FILM COATED ORAL DAILY
Qty: 90 TABLET | Refills: 0 | Status: SHIPPED | OUTPATIENT
Start: 2024-07-30 | End: 2024-07-31

## 2024-07-30 NOTE — TELEPHONE ENCOUNTER
Patient called office requested Farxiga refill - completely out of medication and will be out of town starting Thursday- would like to get her medication by tomorrow at the latest- advised I will let her know if further issues-     LOV:3/1/24  Future Appointments   Date Time Provider Department Center   8/13/2024  2:30 PM Phylicia Cisneros, CEASAR [id number] [place]   Last A1c value was 7.4% done 1/8/2024.

## 2024-07-31 ENCOUNTER — TELEPHONE (OUTPATIENT)
Dept: ENDOCRINOLOGY CLINIC | Facility: CLINIC | Age: 64
End: 2024-07-31

## 2024-07-31 DIAGNOSIS — Z79.4 TYPE 2 DIABETES MELLITUS WITH HYPERGLYCEMIA, WITH LONG-TERM CURRENT USE OF INSULIN (HCC): ICD-10-CM

## 2024-07-31 DIAGNOSIS — E11.65 TYPE 2 DIABETES MELLITUS WITH HYPERGLYCEMIA, WITH LONG-TERM CURRENT USE OF INSULIN (HCC): ICD-10-CM

## 2024-07-31 RX ORDER — DAPAGLIFLOZIN 10 MG/1
10 TABLET, FILM COATED ORAL DAILY
Qty: 90 TABLET | Refills: 1 | Status: SHIPPED | OUTPATIENT
Start: 2024-07-31

## 2024-07-31 RX ORDER — DAPAGLIFLOZIN 10 MG/1
10 TABLET, FILM COATED ORAL DAILY
Qty: 90 TABLET | Refills: 0 | Status: CANCELLED | OUTPATIENT
Start: 2024-07-31

## 2024-07-31 NOTE — TELEPHONE ENCOUNTER
Patient called office regarding prescription re-fill- advised was sent to pharmacy yesterday- she had not heard from them yet- she will call now- advised patient to let us know if she has further issues

## 2024-07-31 NOTE — TELEPHONE ENCOUNTER
Patient had left message on front line- called patient back- she advised that the local pharmacy was trying to charge co-pay even though it should be covered and that they also had reached out to us to get a new script sent- but the response they got was patient no longer needs the medication? I do not see that we got anything back from pharmacy..     Pended script to requested mail order pharmacy- patient should get in next 10 days per the mail order pharmacy-     Patient also wanted to confirm follow up appointment- confirmed below. Patient did request address to [place] office- sent via email to: [email or web address], confirmed with patient

## 2024-08-13 ENCOUNTER — OFFICE VISIT (OUTPATIENT)
Dept: ENDOCRINOLOGY CLINIC | Facility: CLINIC | Age: 64
End: 2024-08-13
Payer: COMMERCIAL

## 2024-08-13 VITALS
WEIGHT: 172.19 LBS | DIASTOLIC BLOOD PRESSURE: 68 MMHG | RESPIRATION RATE: 16 BRPM | HEART RATE: 89 BPM | BODY MASS INDEX: 28 KG/M2 | SYSTOLIC BLOOD PRESSURE: 130 MMHG | OXYGEN SATURATION: 97 %

## 2024-08-13 DIAGNOSIS — Z79.4 TYPE 2 DIABETES MELLITUS WITH HYPERGLYCEMIA, WITH LONG-TERM CURRENT USE OF INSULIN (HCC): Primary | ICD-10-CM

## 2024-08-13 DIAGNOSIS — I10 ESSENTIAL HYPERTENSION: ICD-10-CM

## 2024-08-13 DIAGNOSIS — E11.65 TYPE 2 DIABETES MELLITUS WITH HYPERGLYCEMIA, WITH LONG-TERM CURRENT USE OF INSULIN (HCC): Primary | ICD-10-CM

## 2024-08-13 DIAGNOSIS — E11.42 DIABETIC PERIPHERAL NEUROPATHY (HCC): ICD-10-CM

## 2024-08-13 DIAGNOSIS — E78.2 MIXED HYPERLIPIDEMIA: ICD-10-CM

## 2024-08-13 DIAGNOSIS — I25.10 CORONARY ARTERY DISEASE INVOLVING NATIVE CORONARY ARTERY OF NATIVE HEART WITHOUT ANGINA PECTORIS: ICD-10-CM

## 2024-08-13 LAB — HEMOGLOBIN A1C: 9 % (ref 4.3–5.6)

## 2024-08-13 PROCEDURE — 99215 OFFICE O/P EST HI 40 MIN: CPT | Performed by: NURSE PRACTITIONER

## 2024-08-13 PROCEDURE — 83036 HEMOGLOBIN GLYCOSYLATED A1C: CPT | Performed by: NURSE PRACTITIONER

## 2024-08-13 RX ORDER — PEN NEEDLE, DIABETIC 32GX 5/32"
NEEDLE, DISPOSABLE MISCELLANEOUS
Qty: 300 EACH | Refills: 1 | Status: SHIPPED | OUTPATIENT
Start: 2024-08-13

## 2024-08-13 RX ORDER — INSULIN ASPART 100 [IU]/ML
INJECTION, SOLUTION INTRAVENOUS; SUBCUTANEOUS
Qty: 15 ML | Refills: 0 | Status: SHIPPED | OUTPATIENT
Start: 2024-08-13

## 2024-08-13 RX ORDER — GLUCAGON INJECTION, SOLUTION 1 MG/.2ML
1 INJECTION, SOLUTION SUBCUTANEOUS AS NEEDED
Qty: 0.4 ML | Refills: 1 | Status: SHIPPED | OUTPATIENT
Start: 2024-08-13

## 2024-08-13 NOTE — PATIENT INSTRUCTIONS
For your neuropathy:     Studies have show some benefit with neuropathy symptoms with Alpha Lipoic Acid (ALA) supplement: 600 mg daily. ALA is an antioxidant with the potential to diminish oxidative stress, improve the underlying pathophysiology of neuropathy, and reduce pain. It may take up to 3 weeks to notice the benefit        A1C 9.0%     It appears you need more insulin for after meals     Novolog : meal time insulin   On off days:   Dose 4 units with breakfast     We may need to add at dinner as well - but lets watch trends     Decrease Toujeo 35 units nightly---> 25 units once daily     Continue   Farxiga 10 m tab daily in am        American Diabetes Association: blood sugar targets:     Fasting blood sugar (before breakfast) Target:    (ideally less than 110)  2 hours after eating less than 180 (ideally less than  150 )     Call for blood sugars less than  75 or greater than  200 more than 2 times in a week     If you are wearing the sensor, please look at your trends/averages    Recommendations:   GMI (estimated A1C ) target under  7%     Time in range (healthy blood sugar targets) : goal is over 70%   less than 70 : goal is less than 4%   Over 180 : goal is less than 20 %   Over 250: goal is  less than 5%       Watch for low blood sugars: (less than 70 )    Treatment of Low Blood Glucose Action Plan  1. Check blood glucose to be sure that it is low. You cannot  always go by symptoms or how you feel. If in doubt, treat your low blood glucose anyway.  Rule of 15 :     2. Take 15 grams of carbohydrate (carb). Here are some choices:    4 oz. regular fruit juice  3-4 glucose tablets  6 oz. regular soda   7-8 jelly beans    3. Recheck blood glucose after 10-15 minutes. If blood glucose is still low (less than 70 mg/dl) repeat the treatment (step 2).    4. If your next meal is more than one hour away, eat a small snack.    5. If you’re not sure what caused your low blood glucose, call your healthcare  provider.    6. Always check your blood glucose before you drive       To treat a low, I recommend you carry with you easy, pre-portioned treatment for low blood sugars that are 15G of carbs:   - Children sized squeeze pouch applesauce (high fiber + carbs help prevent too high of a spike)  - Small children's sized juicebox- 15g carb --> 4oz juice box  - Glucose tablets from [employer], you can find them near diabetes supplies --> Note, you will need to eat 3-4 tablets to get to 15g of carbs  - Children sized fruit snack pack- look for one with 15 grams of total carbohydrate    AVOID complex carbs, or foods that contain fats along with carbs (like chocolate) can slow the absorption of glucose and should NOT be used to treat an emergency low    Severe hypoglycemia (low blood sugar)   Patient Education: Glucagon Emergency Kit    You should always have back-up glucagon in case of emergency. Glucagon is given to treat a severe low blood sugar that is not responding to eating or drinking carbohydrate,   or if you are not awake enough to safely eat or swallow.   If you are someone who is at risk for severe hypoglycemia, you should have a current (nonexpired) glucagon emergency kit on hand AND someone who knows where it is kept and how to use it.  When low blood sugar is not treated and you need someone to help you recover, it is considered a severe event.     Treating severe hypoglycemia    Glucagon is a hormone produced in the pancreas that stimulates your liver to release stored glucose into your bloodstream when your blood sugar levels are too low.   Glucagon is an emergency medicine that will raise your blood sugar if you are unable to eat or drink. It is an important part of being prepared if you have diabetes and take insulin or a medication that can cause hypoglycemia (low blood sugar).     Glucagon is available by prescription and is either injected or administered or puffed into the nostril.     The people you  are in frequent contact with (for example, friends, family members and coworkers) should be instructed on how to give you glucagon to treat severe hypoglycemia.          When is the Glucagon Emergency Kit used?  Glucagon should be used to treat hypoglycemia if you are unconscious, unresponsive(meaning you cannot follow commands, even if you appear to be awake) or cannot swallow or keep any food/liquids down.    What is in the Glucagon Emergency Kit?  Your glucagon kit contains picture instructions on how to use it. Be sure to check the date on the kit periodically to make sure your kit has not .    Is glucagon dangerous?  Glucagon is a safe drug. In fact, everyone's body contains natural glucagon to help keep blood sugar level. You need a prescription to obtain glucagon, because it is an emergency medication. There is no danger of overdose. However, it is for emergencies and should be used for emergency low blood sugars only.      What to expect after dosing:   In about 5 to 15 minutes, the person should respond and begin to come out of hypoglycemia. Sometimes a person may experience nausea or may vomit after responding to glucagon. Although initially the blood sugar may spike to a high level, it is important that the patient eat some food containing carbohydrate as soon as you are able since the injectable glucose wears off  .    What else do I need to know?  If the person does not respond within 15 minutes, call 911 immediately. It is possible that the person may be in a coma from severe hyperglycemia (very high blood sugar),rather than hypoglycemia, in which case, glucagon will not help.  Notify your diabetes provider whenever you experience a low blood sugar that requires glucagon. So you  can discuss ways to prevent severe hypoglycemia in the future.

## 2024-08-13 NOTE — PROGRESS NOTES
Chief Complaint   Patient presents with    Diabetes     New patient/Follow up saw [facility] before 3/2024 streaming Dexcom          Sylvia is a 63 year old  presenting for type 2  DM medication management.   Primary care physician: Charisma Olivarez MD  Transfer of care from HAMILTON Ag APRN   Last DM appt     Today's A1C 9.0% ( last A1C 7.4%)   Admits her trends do much better on ozempic however recurrent pancreatitis - has had 3 episodes of pancreatitis (, , )   Last episode in  was ~ 3 m after Ozempic therapy   Each pancreatitis was precipitated after incretin rx.       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     Using dexcom G6 - using smart phone but not connected to clarity today    Pt did reset user name/clarity w staff today-   Dexcom fullfulled by pharmacy- just got Dexcom G6 refilled  so desires to wait for G7     Ongoing issues w L LE neuropathy; lyrica is helping but sxs still present       Diabetes History:  Type 2 DM ~    Patient has not had hospitalizations for blood sugar issues  has history of pancreatitis x 3 (, , )       Previous DM therapies:  Ozempic:   pancreatitis   Metformin ER/IR  - severe nausea and diarrhea  Januvia - pancreatitis (2017)  Jardiance (10 mg)- yeast infections  Novolog - reported  hypoglycemia    Current DM Regimen:  Farxiga 10 m tab daily in am  Toujeo 25 units nightly-->dosing 35 units once daily     HGBA1C:    Lab Results   Component Value Date    A1C 9.0 (A) 2024    A1C 7.4 (H) 2024    A1C 7.4 (A) 2023     (H) 2024           Lab Results   Component Value Date    CHOLEST 164 2024    CHOLEST 180 2022    TRIG 98 2024     TRIG 85 2022    HDL 51 2024    HDL 60 (H) 2022    LDL 95 2024     (H) 2022     Lab Results   Component Value Date    MICROALBCREA 109.5 (H) 2024    MICROALBCREA 1,072.3 (H) 2023      Lab Results   Component Value Date    CREATSERUM 0.78 2024    CREATSERUM 0.65 2022    EGFRCR 85 2024    EGFRCR 99 2022     Lab Results   Component Value Date    AST 15 2024    AST 9 (L) 2022    ALT 20 2024    ALT 15 2022       Lab Results   Component Value Date    TSH 1.083 09/10/2019    TSH 1.560 2017    T4F 1.19 09/10/2019             DM Complications:  Microvascular:   Neuropathy: yes  Retinopathy: no  Nephropathy: yes    Macrovascular:  PVD: no  CAD: yes  HX CAD, CABG    Stroke/CVA: no    Modifying factors:  Medication adherence: yes   Barriers :no    Recent steroids, illness or infections (past 90d) : no     Allergies: Statins, Ozempic (0.25 or 0.5 mg-dose) [semaglutide], Lisinopril, and Seasonal    Past Medical History:    Back problem    cervical radiculopathy C4-C7    Diabetes (HCC)    High blood pressure    High cholesterol    Pancreatitis (HCC)    caused by new diabetic medication    Visual impairment    glasses     Past Surgical History:   Procedure Laterality Date    Appendectomy            Carpal tunnel release  2018    LEFT CARPAL TUNNEL RELEASE, Dr. Torres    Colonoscopy N/A 2017    Procedure: COLONOSCOPY;  Surgeon: Tristin Sanchez MD;  Location:  ENDOSCOPY     Social History     Socioeconomic History    Marital status:    Tobacco Use    Smoking status: Former     Current packs/day: 0.00     Types: Cigarettes     Start date: 1984     Quit date: 2021     Years since quittin.6    Smokeless tobacco: Never    Tobacco comments:     1-2 cigarettes/day   Vaping Use    Vaping status: Never Used   Substance and Sexual Activity    Alcohol use: Yes     Comment: social, wine  only    Drug use: No   Other Topics Concern    Caffeine Concern No     Comment: 1 cup daily    Exercise Yes     Comment: 1-2 days weekly     Social Determinants of Health     Food Insecurity: Low Risk  (2/2/2024)    Received from [facility] Food Security     Within the past 12 months, the food you bought just didn't last and you didn't have money to get more.: 3     Within the past 12 months, you worried that your food would run out before you got money to buy more.: 3   Transportation Needs: Not At Risk (2/2/2024)    Received from [facility] Transportation Needs     In the past 12 months, has lack of reliable transportation kept you from medical appointments, meetings, work or from getting things needed for daily living?: No   Housing Stability: Not At Risk (2/2/2024)    Received from [facility] Housing     What is your living situation today?: I have a steady place to live     Think about the place you live. Do you have problems with any of the following?: None of the above     Family History   Problem Relation Age of Onset    Hypertension Father     Hypertension Mother     Diabetes Maternal Grandmother      Current Medication List:   Current Outpatient Medications   Medication Sig Dispense Refill    insulin aspart (NOVOLOG FLEXPEN) 100 Units/mL Subcutaneous Solution Pen-injector Uses up to 15 units daily as directed in divided doses 15 mL 0    glucagon (GVOKE HYPOPEN 2-PACK) 1 MG/0.2ML Subcutaneous injection Inject 0.2 mL (1 mg total) into the skin as needed. 0.4 mL 1    Insulin Pen Needle (BD PEN NEEDLE MARYSE U/F) 32G X 4 MM Does not apply Misc Up to 3 x daily with insulin 300 each 1    dapagliflozin 10 MG Oral Tab Take 1 tablet (10 mg total) by mouth daily. 90 tablet 1    Insulin Glargine, 1 Unit Dial, (TOUJEO SOLOSTAR) 300 UNIT/ML Subcutaneous Solution Pen-injector Inject 25 Units into the skin daily. 9 mL 2    pregabalin 50 MG Oral Cap Take 1  capsule (50 mg total) by mouth 3 (three) times daily. 90 capsule 2    ezetimibe 10 MG Oral Tab Take 1 tablet (10 mg total) by mouth daily.      LIVALO 1 MG Oral Tab Take 1 mg by mouth daily.      pantoprazole 40 MG Oral Tab EC Take 1 tablet (40 mg total) by mouth every morning before breakfast.      losartan 25 MG Oral Tab Take 1 tablet (25 mg total) by mouth daily. 90 tablet 1    metoprolol tartrate 25 MG Oral Tab Take 1 tablet (25 mg total) by mouth 2x Daily(Beta Blocker). 180 tablet 1    aspirin 81 MG Oral Tab EC Take one tab daily Start on 1/3/22 90 tablet 1    Continuous Blood Gluc Sensor (DEXCOM G6 SENSOR) Does not apply Misc 1 each Every 10 days. 9 each 3    Continuous Blood Gluc Transmit (DEXCOM G6 TRANSMITTER) Does not apply Misc 1 each every 3 (three) months. 1 each 2    ONETOUCH VERIO In Vitro Strip 1 strip by In Vitro route 4 (four) times daily. 400 strip 0    ONETOUCH DELICA LANCETS FINE Does not apply Misc Use to check blood sugar 2 times daily 200 each 3           DM associated review of  symptoms:   Endocrine: Polyuria, polyphagia, polydipsia: no  Neurological: Paresthesias: yes in L foot   HEENT: Blurred vision: no  Skin: no rash or wounds  Hematological: Hypoglycemia: no    Review of Systems     LUNGS: denies shortness of breath   CARDIOVASCULAR: denies chest pain  GI: denies abdominal pain, nausea or diarrhea   : denies dysuria  MUSCULOSKELETAL: denies pain    Physical exam:  /68   Pulse 89   Resp 16   Wt 172 lb 3.2 oz (78.1 kg)   SpO2 97%   BMI 28.01 kg/m²   Body mass index is 28.01 kg/m².      Physical Exam   Vitals reviewed.  Constitutional: Normal appearance   Cardiovascular: Normal rate , rhythm   Pulmonary/Chest: Effort normal  Neurological: Alert and oriented .   Psychiatric: Normal mood and affect.        Assessment/Plan:    Hypertension   Well controlled but needs ongoing monitoring   CPM      Neuropathy  Improve glycemic targets and Stay active  Suggested ALA 600mg daily      Dyslipidemia    labs 1-2024   Continue livalo/zetia per PCP/cardiology    CAD  NO GLP1 rx due to recurrent pancreatitis   Continue SGLT2i rx  Discussed importance of glycemic control for health     Type 2 diabetes mellitus with hyperglycemia, with long-term current use of insulin (HCC)  A1C 9.0%   Weight: 172 lb   Diabetes control is increased .  Reviewed with patient health impact associated with high glucose trends and the importance of better glucose control to prevent onset /progression of DM complications.   Discussed given her longevity of T2DM and recurrent pancreatitis, her beta cell activity is most likely limited , requiring increased need for insulin     Avoid MERINO class for now       Recommendations:   Start novolog flex pen insulin : 4 units w breakfast meal   Ok to hold on work days since she is not eating breakfast ~  ~will check dexcom in 1 week to see if dinner dose needed   Decrease Toujeo solostar :35 u--> 25 units once daily       Reviewed clinical signifiance of A1c, adverse effects of suboptimal glucose control, and goals of therapy   Discussed the A1C test, what the value reflects and the goal for the patient.   Discussed w patient glucose targets (Fasting < 130 and post prandial <180 ) and complications associated with hyperglycemia and uncontrolled DM (on AVS)   Recommend SMBG checks 2 x daily (fasting and varying post prandial times)  Continue with lifestyle modifications due to positive impact on diabetes/blood sugars/health (portion control, physical activity, weight loss)     CHO goals for physical activity and weight loss discussed.  Recommended for  patient to follow up in Diabetes center to review carb goals, food label reading, and offer further support/guidance with exercise planning and weight loss.   Reviewed hypoglycemia signs/symptoms, treatment using the Rule of 15' and when to call DM center . (on AVS)   Provided pt with handouts for reference   For hypoglycemia emergency,  Glucagon rx: gvoke rx       Dexcom check in 3- 4 weeks   The patient is asked to return in 3m  but recommended to contact DM clinic sooner if questions or concerns.    The patient indicates understanding of these issues and agrees to the plan.      Orders Placed This Encounter    HEMOGLOBIN A1C     Order Specific Question:   Release to patient     Answer:   Immediate    insulin aspart (NOVOLOG FLEXPEN) 100 Units/mL Subcutaneous Solution Pen-injector     Sig: Uses up to 15 units daily as directed in divided doses     Dispense:  15 mL     Refill:  0    glucagon (GVOKE HYPOPEN 2-PACK) 1 MG/0.2ML Subcutaneous injection     Sig: Inject 0.2 mL (1 mg total) into the skin as needed.     Dispense:  0.4 mL     Refill:  1    Insulin Pen Needle (BD PEN NEEDLE MARYSE U/F) 32G X 4 MM Does not apply Misc     Sig: Up to 3 x daily with insulin     Dispense:  300 each     Refill:  1       DM quality  A1C/Blood pressure: as reported above   Nephropathy screenin  continue ace /arb rx.   LIPID screenin . + statin rx/zetia rx    Last dilated eye exam: No data recorded Exam shows retinopathy? No data recorded  Last diabetic foot exam: Last Foot Exam: 24    Spent 45 min obtaining patient history, evaluating patient, reviewing blood glucose trends, discussing treatment options, lifestyle modifications and completing documentation -  The risks and benefits of my recommendations, as well as other treatment options were discussed with the patient today. questions were also answered to the best of my knowledge.       Note to patient: The  Cures Act makes medical notes like these available to patients in the interest of transparency. However, be advised this is a medical document. It is intended as peer to peer communication. It is written in medical language and may contain abbreviations or verbiage that are unfamiliar. It may appear blunt or direct. Medical documents are intended to carry relevant information,  facts as evident, and the clinical opinion of the practitioner.

## 2024-08-20 ENCOUNTER — APPOINTMENT (OUTPATIENT)
Dept: FAMILY MEDICINE | Age: 64
End: 2024-08-20

## 2024-08-20 VITALS
RESPIRATION RATE: 16 BRPM | BODY MASS INDEX: 29.38 KG/M2 | HEIGHT: 64 IN | DIASTOLIC BLOOD PRESSURE: 74 MMHG | SYSTOLIC BLOOD PRESSURE: 133 MMHG | OXYGEN SATURATION: 98 % | WEIGHT: 172.07 LBS | HEART RATE: 71 BPM

## 2024-08-20 DIAGNOSIS — I10 BENIGN ESSENTIAL HTN: ICD-10-CM

## 2024-08-20 DIAGNOSIS — E11.59 TYPE 2 DIABETES MELLITUS WITH CARDIAC COMPLICATION  (CMD): Primary | ICD-10-CM

## 2024-08-20 DIAGNOSIS — E78.2 MIXED HYPERLIPIDEMIA: ICD-10-CM

## 2024-08-20 LAB — HBA1C MFR BLD: 9.5 % (ref 4.5–5.6)

## 2024-08-20 PROCEDURE — 3078F DIAST BP <80 MM HG: CPT | Performed by: FAMILY MEDICINE

## 2024-08-20 PROCEDURE — 99214 OFFICE O/P EST MOD 30 MIN: CPT | Performed by: FAMILY MEDICINE

## 2024-08-20 PROCEDURE — 3075F SYST BP GE 130 - 139MM HG: CPT | Performed by: FAMILY MEDICINE

## 2024-08-20 PROCEDURE — 83036 HEMOGLOBIN GLYCOSYLATED A1C: CPT | Performed by: FAMILY MEDICINE

## 2024-08-20 RX ORDER — ASPIRIN 81 MG/1
81 TABLET, COATED ORAL DAILY
COMMUNITY
Start: 2024-06-11

## 2024-08-20 RX ORDER — GLUCAGON INJECTION, SOLUTION 1 MG/.2ML
1 INJECTION, SOLUTION SUBCUTANEOUS
COMMUNITY
Start: 2024-08-13

## 2024-08-20 RX ORDER — SEMAGLUTIDE 0.68 MG/ML
INJECTION, SOLUTION SUBCUTANEOUS
COMMUNITY
Start: 2024-01-08 | End: 2024-08-20 | Stop reason: ALTCHOICE

## 2024-08-27 ENCOUNTER — TELEPHONE (OUTPATIENT)
Dept: ENDOCRINOLOGY CLINIC | Facility: CLINIC | Age: 64
End: 2024-08-27

## 2024-08-27 DIAGNOSIS — Z79.4 TYPE 2 DIABETES MELLITUS WITH HYPERGLYCEMIA, WITH LONG-TERM CURRENT USE OF INSULIN (HCC): Primary | ICD-10-CM

## 2024-08-27 DIAGNOSIS — E11.65 TYPE 2 DIABETES MELLITUS WITH HYPERGLYCEMIA, WITH LONG-TERM CURRENT USE OF INSULIN (HCC): Primary | ICD-10-CM

## 2024-08-27 PROCEDURE — 95251 CONT GLUC MNTR ANALYSIS I&R: CPT | Performed by: NURSE PRACTITIONER

## 2024-08-27 NOTE — TELEPHONE ENCOUNTER
Dexcom download: 8.14.2024 thru 8.27.2024     Coefficient of variation: 32.1   GMI: estimated A1C 8.5 %    Average glucose: 215 mg/dl     37% In target range(70-180mg/dl)     35%High glucose targets (> 180mg/dl) :     28%Very high glucose targets:  (> 250mg/dl)     0%Low glucose targets:(less than 70mg/dl)     0%Very Low glucose targets: (< 54mg/dl ) :     Findings:   Hypoglycemia: none, low risk      Time in target: 37 %  (ADA recommended target > 70%)     Current DM Meds  Farxiga 10mg once daily  Toujeo 25 units once daily   NOVOLOG 4 units at breakfast    Spikes again in PM hours afer 6 pm ( over 250 mg/dl )    Estimated A1C for past 2 weeks is 8.5%  Given her pancreatitis, she most likely needs prandial insulin at main meals : B and D       Plan:   Increase NOVOLOG to 5 units at breakfast and ADD dinner dose 5 units    But add more novolog at B/D meals if BG over 200  200-240, add + 1 u   241-280, add +2 u  Over 281 add + 3 u    Continue Toujeo 25 units once daily   Farxiga 10mg once daily     Will review dexcom  in 1m

## 2024-10-31 DIAGNOSIS — E11.65 TYPE 2 DIABETES MELLITUS WITH HYPERGLYCEMIA, WITH LONG-TERM CURRENT USE OF INSULIN (HCC): ICD-10-CM

## 2024-10-31 DIAGNOSIS — E11.42 DIABETIC PERIPHERAL NEUROPATHY (HCC): ICD-10-CM

## 2024-10-31 DIAGNOSIS — Z79.4 TYPE 2 DIABETES MELLITUS WITH HYPERGLYCEMIA, WITH LONG-TERM CURRENT USE OF INSULIN (HCC): ICD-10-CM

## 2024-10-31 RX ORDER — PREGABALIN 50 MG/1
50 CAPSULE ORAL 3 TIMES DAILY
Qty: 90 CAPSULE | Refills: 2 | Status: CANCELLED | OUTPATIENT
Start: 2024-10-31

## 2024-10-31 NOTE — TELEPHONE ENCOUNTER
Requested Prescriptions     Pending Prescriptions Disp Refills    pregabalin 50 MG Oral Cap 90 capsule 2     Sig: Take 1 capsule (50 mg total) by mouth 3 (three) times daily.     Future Appointments   Date Time Provider Department Center   11/13/2024  1:00 PM Phylicia Cisneros APRN [id number]     Refill 1/30/24 [facility]   LOV 8/13/24 Mike

## 2024-10-31 NOTE — TELEPHONE ENCOUNTER
Despite KR previously managing these medications for her, I do not rx controlled substances and refill will need to be requested from PCP

## 2024-11-14 ENCOUNTER — LAB SERVICES (OUTPATIENT)
Dept: LAB | Age: 64
End: 2024-11-14

## 2024-11-14 ENCOUNTER — APPOINTMENT (OUTPATIENT)
Dept: FAMILY MEDICINE | Age: 64
End: 2024-11-14

## 2024-11-14 VITALS
HEART RATE: 79 BPM | BODY MASS INDEX: 29.49 KG/M2 | HEIGHT: 64 IN | RESPIRATION RATE: 16 BRPM | WEIGHT: 172.73 LBS | OXYGEN SATURATION: 100 % | SYSTOLIC BLOOD PRESSURE: 133 MMHG | DIASTOLIC BLOOD PRESSURE: 80 MMHG

## 2024-11-14 DIAGNOSIS — E78.5 DYSLIPIDEMIA: ICD-10-CM

## 2024-11-14 DIAGNOSIS — I10 BENIGN ESSENTIAL HTN: Primary | ICD-10-CM

## 2024-11-14 DIAGNOSIS — Z79.4 TYPE 2 DIABETES MELLITUS WITHOUT COMPLICATION, WITH LONG-TERM CURRENT USE OF INSULIN  (CMD): ICD-10-CM

## 2024-11-14 DIAGNOSIS — Z02.1 PHYSICAL EXAM, PRE-EMPLOYMENT: ICD-10-CM

## 2024-11-14 DIAGNOSIS — E11.9 TYPE 2 DIABETES MELLITUS WITHOUT COMPLICATION, WITH LONG-TERM CURRENT USE OF INSULIN  (CMD): ICD-10-CM

## 2024-11-14 PROCEDURE — 83036 HEMOGLOBIN GLYCOSYLATED A1C: CPT | Performed by: CLINICAL MEDICAL LABORATORY

## 2024-11-14 PROCEDURE — 36415 COLL VENOUS BLD VENIPUNCTURE: CPT | Performed by: FAMILY MEDICINE

## 2024-11-14 PROCEDURE — 86480 TB TEST CELL IMMUN MEASURE: CPT | Performed by: CLINICAL MEDICAL LABORATORY

## 2024-11-14 RX ORDER — PREGABALIN 50 MG/1
50 CAPSULE ORAL 3 TIMES DAILY
Qty: 90 CAPSULE | Refills: 1 | Status: SHIPPED | OUTPATIENT
Start: 2024-11-14 | End: 2025-11-14

## 2024-11-14 ASSESSMENT — PATIENT HEALTH QUESTIONNAIRE - PHQ9
SUM OF ALL RESPONSES TO PHQ9 QUESTIONS 1 AND 2: 0
1. LITTLE INTEREST OR PLEASURE IN DOING THINGS: NOT AT ALL
CLINICAL INTERPRETATION OF PHQ2 SCORE: NO FURTHER SCREENING NEEDED
SUM OF ALL RESPONSES TO PHQ9 QUESTIONS 1 AND 2: 0
2. FEELING DOWN, DEPRESSED OR HOPELESS: NOT AT ALL

## 2024-11-15 LAB — HBA1C MFR BLD: 9.3 % (ref 4.5–5.6)

## 2024-11-16 LAB
GAMMA INTERFERON BACKGROUND BLD IA-ACNC: 0.04 IU/ML
M TB IFN-G BLD-IMP: NEGATIVE
M TB IFN-G CD4+ BCKGRND COR BLD-ACNC: 0.01 IU/ML
M TB IFN-G CD4+CD8+ BCKGRND COR BLD-ACNC: 0 IU/ML
MITOGEN IGNF BCKGRD COR BLD-ACNC: 5.76 IU/ML

## 2024-11-18 ENCOUNTER — OFFICE VISIT (OUTPATIENT)
Age: 64
End: 2024-11-18
Payer: MEDICARE

## 2024-11-18 VITALS
HEIGHT: 65.75 IN | BODY MASS INDEX: 28.27 KG/M2 | DIASTOLIC BLOOD PRESSURE: 56 MMHG | HEART RATE: 69 BPM | WEIGHT: 173.81 LBS | SYSTOLIC BLOOD PRESSURE: 110 MMHG | OXYGEN SATURATION: 98 % | RESPIRATION RATE: 18 BRPM

## 2024-11-18 DIAGNOSIS — Z79.4 TYPE 2 DIABETES MELLITUS WITH HYPERGLYCEMIA, WITH LONG-TERM CURRENT USE OF INSULIN (HCC): ICD-10-CM

## 2024-11-18 DIAGNOSIS — I10 ESSENTIAL HYPERTENSION: ICD-10-CM

## 2024-11-18 DIAGNOSIS — E11.42 DIABETIC PERIPHERAL NEUROPATHY (HCC): ICD-10-CM

## 2024-11-18 DIAGNOSIS — E11.8 DIABETES MELLITUS TYPE 2 WITH COMPLICATIONS (HCC): Primary | ICD-10-CM

## 2024-11-18 DIAGNOSIS — E11.65 TYPE 2 DIABETES MELLITUS WITH HYPERGLYCEMIA, WITH LONG-TERM CURRENT USE OF INSULIN (HCC): ICD-10-CM

## 2024-11-18 PROCEDURE — 99214 OFFICE O/P EST MOD 30 MIN: CPT

## 2024-11-18 PROCEDURE — 95251 CONT GLUC MNTR ANALYSIS I&R: CPT

## 2024-11-18 NOTE — PROGRESS NOTES
Chief Complaint   Patient presents with    Diabetes     Follow up. Streaming Dexcom         Sylvia is a 63 year old  presenting for type 2  DM medication management.   Primary care physician: Charisma Olivarez MD    Last DM appt 8-2024    Today's A1C 9.3% ( last A1C 9.0%)   Admits her trends do much better on ozempic however recurrent pancreatitis - has had 3 episodes of pancreatitis (2012, 2017, 2024)   Last episode in 2024 was ~ 3 m after Ozempic therapy   Each pancreatitis was precipitated after incretin rx.       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    Complained of whole arm pain after placement of Dexcom sensor. Pt reported that pain persisted despite removal of sensor. Taking Tylenol daily and did not have the Lyrica available for 10 days now. Lyrica refilled by PCP to help with pain. Med available but has to be picked up fr the pharmacy. New placement on right buttock area did not result in any pain or issues. Has lingering issues w L LE neuropathy post CABG; lyrica is helping but sxs remained, not worse, compared a yr ago.    Admits that she has multiple hyperglycemic alerts and was not dosing her Novolog. She can not remember the last time she gave herself Novolog. She admits to be being sedentary.     Using dexcom G7  Dexcom download: 11/5/24 - 11/18/24     Coefficient of variation: 32.6   GMI: estimated A1C 8.3     Average glucose: 209 mg/dl     38% In target range(70-180mg/dl)     34 %High glucose targets (> 180mg/dl) :     28%Very high glucose targets:  (> 250mg/dl)     0%Low glucose targets:(less than 70mg/dl)     1%Very Low glucose targets: (< 54mg/dl ) :     Findings:   Hypoglycemia: None     Time in target: 38 %  (ADA recommended target > 70%)   Pt has postprandial elevations that persists in about 3-5 hours and goes back to TIR untl the next meal.  Has hyperglycemic bedtime trends which continues after dinner meal but mostly trends back to  TIR after 2 AM.     For older and/or high-risk people with diabetes, the TIR target is lowered to >50% and TBR reduced to <1% at <70 mg/dL          Diabetes History:  Type 2 DM ~ 2005   Patient has not had hospitalizations for blood sugar issues  has history of pancreatitis x 3 (, , )       Previous DM therapies:  Ozempic:   pancreatitis   Metformin ER/IR  - severe nausea and diarrhea  Januvia - pancreatitis (2017)  Jardiance (10 mg)- yeast infections  Novolog - reported  ypoglycemia    Current DM Regimen:  Farxiga 10 m tab daily in am  Toujeo 25 units nightly  Novolog sliding scale but pt admits to not dosing    HGBA1C:    Lab Results   Component Value Date    A1C 9.0 (A) 2024    A1C 7.4 (H) 2024    A1C 7.4 (A) 2023     (H) 2024           Lab Results   Component Value Date    CHOLEST 164 2024    CHOLEST 180 2022    TRIG 98 2024    TRIG 85 2022    HDL 51 2024    HDL 60 (H) 2022    LDL 95 2024     (H) 2022     Lab Results   Component Value Date    MICROALBCREA 109.5 (H) 2024    MICROALBCREA 1,072.3 (H) 2023      Lab Results   Component Value Date    CREATSERUM 0.78 2024    CREATSERUM 0.65 2022    EGFRCR 85 2024    EGFRCR 99 2022     Lab Results   Component Value Date    AST 15 2024    AST 9 (L) 2022    ALT 20 2024    ALT 15 2022       Lab Results   Component Value Date    TSH 1.083 09/10/2019    TSH 1.560 2017    T4F 1.19 09/10/2019             DM Complications:  Microvascular:   Neuropathy: yes  Retinopathy: no  Nephropathy: yes    Macrovascular:  PVD: no  CAD: yes  HX CAD, CABG    Stroke/CVA: no    Modifying factors:  Medication adherence: yes   Barriers :no    Recent steroids, illness or infections (past 90d) : no     Allergies: Statins, Ozempic (0.25 or 0.5 mg-dose) [semaglutide], Lisinopril, and Seasonal    Past Medical History:     Back problem    cervical radiculopathy C4-C7    Diabetes (HCC)    High blood pressure    High cholesterol    Pancreatitis (HCC)    caused by new diabetic medication    Visual impairment    glasses     Past Surgical History:   Procedure Laterality Date    Appendectomy            Carpal tunnel release  2018    LEFT CARPAL TUNNEL RELEASE, Dr. Torres    Colonoscopy N/A 2017    Procedure: COLONOSCOPY;  Surgeon: Tristin Sanchez MD;  Location:  ENDOSCOPY     Social History     Socioeconomic History    Marital status:    Tobacco Use    Smoking status: Former     Current packs/day: 0.00     Types: Cigarettes     Start date: 1984     Quit date: 2021     Years since quittin.9    Smokeless tobacco: Never    Tobacco comments:     1-2 cigarettes/day   Vaping Use    Vaping status: Never Used   Substance and Sexual Activity    Alcohol use: Yes     Comment: social, wine only    Drug use: No   Other Topics Concern    Caffeine Concern No     Comment: 1 cup daily    Exercise Yes     Comment: 1-2 days weekly     Social Drivers of Health     Food Insecurity: Low Risk  (2024)    [facility]     Within the past 12 months, the food you bought just didn't last and you didn't have money to get more.: 3     Within the past 12 months, you worried that your food would run out before you got money to buy more.: 3   Transportation Needs: Not At Risk (2024)    [facility] Needs     In the past 12 months, has lack of reliable transportation kept you from medical appointments, meetings, work or from getting things needed for daily living?: No   Housing Stability: Not At Risk (2024)    [facility]     What is your living situation today?: I have a steady place to live     Think about the place you live. Do you have problems with any of the  following?: None of the above     Family History   Problem Relation Age of Onset    Hypertension Father     Hypertension Mother     Diabetes Maternal Grandmother      Current Medication List:   Current Outpatient Medications   Medication Sig Dispense Refill    insulin aspart (NOVOLOG FLEXPEN) 100 Units/mL Subcutaneous Solution Pen-injector Uses up to 15 units daily as directed in divided doses 15 mL 0    glucagon (GVOKE HYPOPEN 2-PACK) 1 MG/0.2ML Subcutaneous injection Inject 0.2 mL (1 mg total) into the skin as needed. 0.4 mL 1    Insulin Pen Needle (BD PEN NEEDLE MARYSE U/F) 32G X 4 MM Does not apply Misc Up to 3 x daily with insulin 300 each 1    dapagliflozin 10 MG Oral Tab Take 1 tablet (10 mg total) by mouth daily. 90 tablet 1    Insulin Glargine, 1 Unit Dial, (TOUJEO SOLOSTAR) 300 UNIT/ML Subcutaneous Solution Pen-injector Inject 25 Units into the skin daily. 9 mL 2    pregabalin 50 MG Oral Cap Take 1 capsule (50 mg total) by mouth 3 (three) times daily. 90 capsule 2    Continuous Blood Gluc Sensor (DEXCOM G6 SENSOR) Does not apply Misc 1 each Every 10 days. (Patient not taking: Reported on 11/18/2024) 9 each 3    Continuous Blood Gluc Transmit (DEXCOM G6 TRANSMITTER) Does not apply Misc 1 each every 3 (three) months. (Patient not taking: Reported on 11/18/2024) 1 each 2    ezetimibe 10 MG Oral Tab Take 1 tablet (10 mg total) by mouth daily.      LIVALO 1 MG Oral Tab Take 1 mg by mouth daily.      pantoprazole 40 MG Oral Tab EC Take 1 tablet (40 mg total) by mouth every morning before breakfast.      losartan 25 MG Oral Tab Take 1 tablet (25 mg total) by mouth daily. 90 tablet 1    metoprolol tartrate 25 MG Oral Tab Take 1 tablet (25 mg total) by mouth 2x Daily(Beta Blocker). 180 tablet 1    aspirin 81 MG Oral Tab EC Take one tab daily Start on 1/3/22 90 tablet 1    ONETOUCH VERIO In Vitro Strip 1 strip by In Vitro route 4 (four) times daily. 400 strip 0    ONETOUCH DELICA LANCETS FINE Does not apply Misc Use  to check blood sugar 2 times daily 200 each 3           DM associated review of  symptoms:   Endocrine: Polyuria, polyphagia, polydipsia: no  Neurological: Paresthesias: yes in L foot, recently left arm   HEENT: Blurred vision: no  Skin: no rash or wounds  Hematological: Hypoglycemia: no    Review of Systems     LUNGS: denies shortness of breath   CARDIOVASCULAR: denies chest pain  GI: denies abdominal pain, nausea or diarrhea   : denies dysuria  MUSCULOSKELETAL: denies pain    Physical exam:  /56   Pulse 69   Resp 18   Ht 5' 5.75\" (1.67 m)   Wt 173 lb 12.8 oz (78.8 kg)   SpO2 98%   BMI 28.27 kg/m²   Body mass index is 28.27 kg/m².      Physical Exam   Vitals reviewed.  Constitutional: Normal appearance   Cardiovascular: Normal rate , rhythm   Pulmonary/Chest: Effort normal  Neurological: Alert and oriented .   Psychiatric: Normal mood and affect.   Left upper Extremity: No swelling, redness or rash, pinpoint tender on upper mid posterior arm  Left foot: +1 pedal pulse, warm, dry, nontender,      Assessment/Plan:    Hypertension  Well controlled but needs ongoing monitoring   CPM      Neuropathy  Improve glycemic targets and Stay active  Advised to do chair exercises when watching tv during commercials  Suggested ALA 600mg daily   Lyrica for pick-up (off fr med for 10 days)    Dyslipidemia    labs 1-2024; rx for lab test given for the next follow-up  Continue livalo/zetia per PCP/cardiology    CAD  NO GLP1 rx due to recurrent pancreatitis   Continue SGLT2i rx  Discussed importance of glycemic control for health     Type 2 diabetes mellitus with hyperglycemia, with long-term current use of insulin ([facility])  A1C 9.3%   Weight: 173 lb   Diabetes control is increased.  Advised to do chair exercises as a start while watching TV.    Reviewed with patient health impact associated with high glucose trends and the importance of better glucose control to prevent onset /progression of DM complications.   Discussed  given her longevity of T2DM and recurrent pancreatitis, her beta cell activity is most likely limited , requiring increased need for insulin       Recommendations:     Restart novolog flex pen insulin per previous sliding scale and dose self consistently prior to meals (B/D). Give an added dose at lunch if eating following same sliding scale.    NOVOLOG 5 units at breakfast and  dinner meals    plus sliding scale;      if BG over 200  before B/D meals:      200-240, add + 1 u      241-280, add +2 u     Over 281 add + 3 u     Contiue Toujeo solostar :25 units once daily   Farxiga 10 mg/day      Reviewed clinical signifiance of A1c, adverse effects of suboptimal glucose control, and goals of therapy   Discussed the A1C test, what the value reflects and the goal for the patient.   Discussed w patient glucose targets (Fasting < 130 and post prandial <180 ) and complications associated with hyperglycemia and uncontrolled DM (on AVS)   Recommend SMBG checks 2 x daily (fasting and varying post prandial times)  Continue with lifestyle modifications due to positive impact on diabetes/blood sugars/health (portion control, physical activity, weight loss)     CHO goals for physical activity and weight loss discussed.  Recommended for  patient to follow up in Diabetes center to review carb goals, food label reading, and offer further support/guidance with exercise planning and weight loss.   Reviewed hypoglycemia signs/symptoms, treatment using the Rule of 15' and when to call DM center . (on AVS)   Provided pt with handouts for reference   For hypoglycemia emergency, Glucagon rx: gvoke rx       Dexcom G7 and rotate sites.  The patient is asked to return in 3m with Phylicia Cisneros but recommended to contact DM clinic sooner if questions or concerns.    The patient indicates understanding of these issues and agrees to the plan.      Orders Placed This Encounter    Comp Metabolic Panel (14) [E]     Standing Status:   Future      Standing Expiration Date:   2025     Order Specific Question:   Release to patient     Answer:   Immediate    Microalb/Creat Ratio, Random Urine     Standing Status:   Future     Standing Expiration Date:   2025    Lipid Panel     Standing Status:   Future     Standing Expiration Date:   2025    TSH W Reflex To Free T4     Standing Status:   Future     Standing Expiration Date:   2025     Order Specific Question:   Release to patient     Answer:   Immediate       DM quality  A1C/Blood pressure: as reported above   Nephropathy screenin  continue ace /arb rx.   LIPID screenin . + statin rx/zetia rx    Last dilated eye exam: No data recorded Exam shows retinopathy? No data recorded  Last diabetic foot exam: Last Foot Exam: 24    Spent 45 min obtaining patient history, evaluating patient, reviewing blood glucose trends, discussing treatment options, lifestyle modifications and completing documentation -  The risks and benefits of my recommendations, as well as other treatment options were discussed with the patient today. questions were also answered to the best of my knowledge.       Note to patient: The  Cures Act makes medical notes like these available to patients in the interest of transparency. However, be advised this is a medical document. It is intended as peer to peer communication. It is written in medical language and may contain abbreviations or verbiage that are unfamiliar. It may appear blunt or direct. Medical documents are intended to carry relevant information, facts as evident, and the clinical opinion of the practitioner.

## 2024-11-18 NOTE — PROGRESS NOTES
-----------------------------  Dexcom Clarity  -----------------------------  Sylvia Kameron  YOB: 1960  Generated at: Mon, Nov 18, 2024 1:24 PM CST  Reporting period: Sun Oct 20, 2024 - Mon Nov 18, 2024  -----------------------------  Glucose Details  Average glucose: 204 mg/dL  Standard deviation: 67 mg/dL  -----------------------------  Time in Range  Very High: 25%  High: 34%  In Range: 41%  Low: 0%  Very Low: <1%  Target Range   mg/dL    -----------------------------  CGM Details  Sensor usage: 83%  Days with CGM data: 25/30

## 2024-11-21 ENCOUNTER — APPOINTMENT (OUTPATIENT)
Dept: FAMILY MEDICINE | Age: 64
End: 2024-11-21

## 2024-11-25 ENCOUNTER — E-ADVICE (OUTPATIENT)
Dept: INTERNAL MEDICINE | Age: 64
End: 2024-11-25

## 2024-12-10 DIAGNOSIS — Z79.4 TYPE 2 DIABETES MELLITUS WITH HYPERGLYCEMIA, WITH LONG-TERM CURRENT USE OF INSULIN (HCC): ICD-10-CM

## 2024-12-10 DIAGNOSIS — E11.65 TYPE 2 DIABETES MELLITUS WITH HYPERGLYCEMIA, WITH LONG-TERM CURRENT USE OF INSULIN (HCC): ICD-10-CM

## 2024-12-10 RX ORDER — PROCHLORPERAZINE 25 MG/1
1 SUPPOSITORY RECTAL
Qty: 9 EACH | Refills: 1 | Status: SHIPPED | OUTPATIENT
Start: 2024-12-10

## 2024-12-10 NOTE — TELEPHONE ENCOUNTER
Requested Prescriptions     Pending Prescriptions Disp Refills    Continuous Glucose Sensor (DEXCOM G6 SENSOR) Does not apply Misc 9 each 1     Si each Every 10 days.     HGBA1C:    Lab Results   Component Value Date    A1C 9.0 (A) 2024    A1C 7.4 (H) 2024    A1C 7.4 (A) 2023     (H) 2024     Your Appointments      2025 9:45 AM  Diabetes Pump follow up with CEASAR Mc  [facility] ([facility]) [address] [address] [phone]             Last OFFICE VISIT: 2024-AM    Last refill:  each with 3 refills -KR

## 2024-12-10 NOTE — TELEPHONE ENCOUNTER
Received request for documentation for G6 sensors.  Normally with medicare patients for Dexcom, we only send to [facility]'s pharmacy or [facility].  Faxed chart note as requested by [facility] to [phone] via [employer].  Awaiting decision.

## 2024-12-19 ENCOUNTER — NURSE ONLY (OUTPATIENT)
Age: 64
End: 2024-12-19
Payer: MEDICARE

## 2024-12-19 ENCOUNTER — HOSPITAL ENCOUNTER (OUTPATIENT)
Dept: LAB | Facility: HOSPITAL | Age: 64
Discharge: HOME | End: 2024-12-19
Payer: MEDICARE

## 2024-12-19 ENCOUNTER — TELEPHONE (OUTPATIENT)
Age: 64
End: 2024-12-19

## 2024-12-19 DIAGNOSIS — E11.8 DIABETES MELLITUS TYPE 2 WITH COMPLICATIONS (HCC): ICD-10-CM

## 2024-12-19 LAB
ALBUMIN SERPL-MCNC: 4.2 G/DL (ref 3.2–4.8)
ALBUMIN/GLOB SERPL: 1.2 {RATIO} (ref 1–2)
ALP LIVER SERPL-CCNC: 58 U/L
ALT SERPL-CCNC: 15 U/L
ANION GAP SERPL CALC-SCNC: 7 MMOL/L (ref 0–18)
AST SERPL-CCNC: 20 U/L (ref ?–34)
BILIRUB SERPL-MCNC: 0.4 MG/DL (ref 0.2–1.1)
BUN BLD-MCNC: 16 MG/DL (ref 9–23)
CALCIUM BLD-MCNC: 9.1 MG/DL (ref 8.7–10.4)
CHLORIDE SERPL-SCNC: 109 MMOL/L (ref 98–112)
CHOLEST SERPL-MCNC: 180 MG/DL (ref ?–200)
CO2 SERPL-SCNC: 24 MMOL/L (ref 21–32)
CREAT BLD-MCNC: 0.61 MG/DL
CREAT UR-SCNC: 54.6 MG/DL
EGFRCR SERPLBLD CKD-EPI 2021: 99 ML/MIN/1.73M2 (ref 60–?)
GLOBULIN PLAS-MCNC: 3.4 G/DL (ref 2–3.5)
GLUCOSE BLD-MCNC: 193 MG/DL (ref 70–99)
HDLC SERPL-MCNC: 52 MG/DL (ref 40–59)
LDLC SERPL CALC-MCNC: 110 MG/DL (ref ?–100)
MICROALBUMIN UR-MCNC: 40.2 MG/DL
MICROALBUMIN/CREAT 24H UR-RTO: 736.3 UG/MG (ref ?–30)
NONHDLC SERPL-MCNC: 128 MG/DL (ref ?–130)
OSMOLALITY SERPL CALC.SUM OF ELEC: 296 MOSM/KG (ref 275–295)
POTASSIUM SERPL-SCNC: 4.1 MMOL/L (ref 3.5–5.1)
PROT SERPL-MCNC: 7.6 G/DL (ref 5.7–8.2)
SODIUM SERPL-SCNC: 140 MMOL/L (ref 136–145)
TRIGL SERPL-MCNC: 98 MG/DL (ref 30–149)
TSI SER-ACNC: 1.3 UIU/ML (ref 0.55–4.78)
VLDLC SERPL CALC-MCNC: 17 MG/DL (ref 0–30)

## 2024-12-19 PROCEDURE — 84443 ASSAY THYROID STIM HORMONE: CPT

## 2024-12-19 PROCEDURE — 82570 ASSAY OF URINE CREATININE: CPT

## 2024-12-19 PROCEDURE — 36415 COLL VENOUS BLD VENIPUNCTURE: CPT

## 2024-12-19 PROCEDURE — 82043 UR ALBUMIN QUANTITATIVE: CPT

## 2024-12-19 PROCEDURE — 80061 LIPID PANEL: CPT

## 2024-12-19 PROCEDURE — 80053 COMPREHEN METABOLIC PANEL: CPT

## 2024-12-19 NOTE — TELEPHONE ENCOUNTER
Patient called in stating she's been out of sensors for 10 days. Offered sample sensor.   She is recently new to Medicare so we don't send these to [facility], either [facility] or [facility].  She does have a supplemental plan, so will recommend [facility] to be covered at 100%, otherwise if patient prefers, can use [facility] instead.

## 2025-02-12 SDOH — ECONOMIC STABILITY: FOOD INSECURITY: WITHIN THE PAST 12 MONTHS, THE FOOD YOU BOUGHT JUST DIDN'T LAST AND YOU DIDN'T HAVE MONEY TO GET MORE.: NEVER TRUE

## 2025-02-12 SDOH — ECONOMIC STABILITY: GENERAL: WOULD YOU LIKE HELP WITH ANY OF THE FOLLOWING NEEDS?: I DON'T WANT HELP WITH ANY OF THESE

## 2025-02-12 SDOH — ECONOMIC STABILITY: HOUSING INSECURITY: DO YOU HAVE PROBLEMS WITH ANY OF THE FOLLOWING?: NONE OF THE ABOVE

## 2025-02-12 SDOH — ECONOMIC STABILITY: HOUSING INSECURITY: WHAT IS YOUR LIVING SITUATION TODAY?: I HAVE A STEADY PLACE TO LIVE

## 2025-02-12 SDOH — ECONOMIC STABILITY: TRANSPORTATION INSECURITY
IN THE PAST 12 MONTHS, HAS LACK OF RELIABLE TRANSPORTATION KEPT YOU FROM MEDICAL APPOINTMENTS, MEETINGS, WORK OR FROM GETTING THINGS NEEDED FOR DAILY LIVING?: NO

## 2025-02-12 ASSESSMENT — SOCIAL DETERMINANTS OF HEALTH (SDOH): IN THE PAST 12 MONTHS, HAS THE ELECTRIC, GAS, OIL, OR WATER COMPANY THREATENED TO SHUT OFF SERVICE IN YOUR HOME?: NO

## 2025-02-14 ENCOUNTER — APPOINTMENT (OUTPATIENT)
Dept: FAMILY MEDICINE | Age: 65
End: 2025-02-14

## 2025-02-14 VITALS
TEMPERATURE: 97.6 F | SYSTOLIC BLOOD PRESSURE: 115 MMHG | OXYGEN SATURATION: 100 % | HEIGHT: 64 IN | WEIGHT: 174.16 LBS | BODY MASS INDEX: 29.73 KG/M2 | HEART RATE: 70 BPM | DIASTOLIC BLOOD PRESSURE: 69 MMHG

## 2025-02-14 DIAGNOSIS — E11.9 TYPE 2 DIABETES MELLITUS WITHOUT COMPLICATION, WITH LONG-TERM CURRENT USE OF INSULIN  (CMD): Primary | ICD-10-CM

## 2025-02-14 DIAGNOSIS — I10 BENIGN ESSENTIAL HTN: ICD-10-CM

## 2025-02-14 DIAGNOSIS — M81.0 AGE-RELATED OSTEOPOROSIS WITHOUT CURRENT PATHOLOGICAL FRACTURE: ICD-10-CM

## 2025-02-14 DIAGNOSIS — I25.10 ATHEROSCLEROSIS OF NATIVE CORONARY ARTERY OF NATIVE HEART WITHOUT ANGINA PECTORIS: ICD-10-CM

## 2025-02-14 DIAGNOSIS — Z12.31 VISIT FOR SCREENING MAMMOGRAM: ICD-10-CM

## 2025-02-14 DIAGNOSIS — Z72.0 TOBACCO ABUSE: ICD-10-CM

## 2025-02-14 DIAGNOSIS — E11.59 TYPE 2 DIABETES MELLITUS WITH CARDIAC COMPLICATION (CMD): ICD-10-CM

## 2025-02-14 DIAGNOSIS — J06.9 VIRAL URI WITH COUGH: ICD-10-CM

## 2025-02-14 DIAGNOSIS — Z79.4 TYPE 2 DIABETES MELLITUS WITHOUT COMPLICATION, WITH LONG-TERM CURRENT USE OF INSULIN  (CMD): Primary | ICD-10-CM

## 2025-02-14 DIAGNOSIS — Z95.1 S/P CABG X 4: ICD-10-CM

## 2025-02-14 LAB
AMB EXT HGBA1C: 9.1 %
HBA1C MFR BLD: 9.1 % (ref 4.5–5.6)

## 2025-02-14 RX ORDER — AZITHROMYCIN 250 MG/1
TABLET, FILM COATED ORAL
Qty: 6 TABLET | Refills: 0 | Status: SHIPPED | OUTPATIENT
Start: 2025-02-14

## 2025-02-15 ENCOUNTER — E-ADVICE (OUTPATIENT)
Dept: FAMILY MEDICINE | Age: 65
End: 2025-02-15

## 2025-02-15 LAB
FLUAV RNA RESP QL NAA+PROBE: NOT DETECTED
FLUBV RNA RESP QL NAA+PROBE: NOT DETECTED
RSV AG NPH QL IA.RAPID: NOT DETECTED
SARS-COV-2 RNA RESP QL NAA+PROBE: NOT DETECTED
SERVICE CMNT-IMP: NORMAL
SERVICE CMNT-IMP: NORMAL

## 2025-02-25 NOTE — PROGRESS NOTES
Chief Complaint   Patient presents with    Diabetes     Follow up streaming dexcom      Sylvia is a 64 year old  presenting for type 2  DM medication management.   Primary care physician: Charisma Olivarez MD  Last Diabetes appointment with me:    Met w Renée Williamson NP  --> restarted Novolog   Admits NOT consistently dosing novolog  Admits burden managing Diabetes.   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)   PCP recently increased toujeo fro 25 u --> 35 u with most recent A1C         Diabetes History:  Type 2 DM ~    Patient has not had hospitalizations for blood sugar issues  has history of pancreatitis x 3 (, , )       Previous DM therapies:  Ozempic:   pancreatitis   Metformin ER/IR  - severe nausea and diarrhea  Januvia - pancreatitis (2017)  Jardiance (10 mg)- yeast infections  Novolog - reported  hypoglycemia    Current DM Regimen:  Farxiga 10 m tab daily in am  Toujeo 25 units once daily    NOVOLOG flex pen: 5 units w B/D meals plus scale --> not consistently dosing       200-240, +1 units   241-280 , + 2 units   Over 281, +3 units     HGBA1C:    Lab Results   Component Value Date    A1C 9.1 2025    A1C 9.0 (A) 2024    A1C 7.4 (H) 2024     (H) 2024           Lab Results   Component Value Date    CHOLEST 180 2024    CHOLEST 164 2024    TRIG 98 2024    TRIG 98 2024    HDL 52 2024    HDL 51 2024     (H) 2024    LDL 95  2024     Lab Results   Component Value Date    MICROALBCREA 736.3 (H) 2024    MICROALBCREA 109.5 (H) 2024      Lab Results   Component Value Date    CREATSERUM 0.61 2024    CREATSERUM 0.78 2024    EGFRCR 99 2024    EGFRCR 85 2024     Lab Results   Component Value Date    AST 20 2024    AST 15 2024    ALT 15 2024    ALT 20 2024       Lab Results   Component Value Date    TSH 1.300 2024    TSH 1.083 09/10/2019    T4F 1.19 09/10/2019       DM Complications:  Microvascular:   Neuropathy: yes  Retinopathy: no  Nephropathy: yes    Macrovascular:  PVD: no  CAD: yes  HX CAD, CABG    Stroke/CVA: no    Modifying factors:  Medication adherence: No missing NOVOLOG   Barriers :no    Recent steroids, illness or infections (past 90d) : no     Allergies: Statins, Ozempic (0.25 or 0.5 mg-dose) [semaglutide], Lisinopril, and Seasonal    Past Medical History:    Back problem    cervical radiculopathy C4-C7    Diabetes (HCC)    High blood pressure    High cholesterol    Pancreatitis (HCC)    caused by new diabetic medication    Visual impairment    glasses     Past Surgical History:   Procedure Laterality Date    Appendectomy            Carpal tunnel release  2018    LEFT CARPAL TUNNEL RELEASE, Dr. Torres    Colonoscopy N/A 2017    Procedure: COLONOSCOPY;  Surgeon: Tristin Sanchez MD;  Location:  ENDOSCOPY     Social History     Socioeconomic History    Marital status:    Tobacco Use    Smoking status: Former     Current packs/day: 0.00     Types: Cigarettes     Start date: 1984     Quit date: 2021     Years since quitting: 3.1    Smokeless tobacco: Never    Tobacco comments:     1-2 cigarettes/day   Vaping Use    Vaping status: Never Used   Substance and Sexual Activity    Alcohol use: Yes     Comment: social, wine only    Drug use: No   Other Topics Concern    Caffeine Concern No     Comment: 1 cup daily     Exercise Yes     Comment: 1-2 days weekly     Social Drivers of Health     Food Insecurity: Low Risk  (2/12/2025)    Received from [facility]    Food Insecurity     Within the past 12 months, you worried that your food would run out before you got money to buy more.  : Never true     Within the past 12 months, the food you bought just didn't last and you didn't have money to get more. : Never true   Transportation Needs: Not At Risk (2/12/2025)    Received from [facility]    Transportation Needs     In the past 12 months, has lack of reliable transportation kept you from medical appointments, meetings, work or from getting things needed for daily living? : No   Housing Stability: Not At Risk (2/2/2024)    Received from [facility]     What is your living situation today?: I have a steady place to live     Think about the place you live. Do you have problems with any of the following?: None of the above     Family History   Problem Relation Age of Onset    Hypertension Father     Hypertension Mother     Diabetes Maternal Grandmother      Current Medication List:   Current Outpatient Medications   Medication Sig Dispense Refill    dapagliflozin (FARXIGA) 10 MG Oral Tab Take 1 tablet (10 mg total) by mouth daily. 30 tablet 0    insulin aspart (NOVOLOG FLEXPEN) 100 Units/mL Subcutaneous Solution Pen-injector Uses up to 15 units daily as directed in divided doses 15 mL 0    dapagliflozin 10 MG Oral Tab Take 1 tablet (10 mg total) by mouth daily. 90 tablet 1    pregabalin 50 MG Oral Cap Take 1 capsule (50 mg total) by mouth 3 (three) times daily. 90 capsule 2    ezetimibe 10 MG Oral Tab Take 1 tablet (10 mg total) by mouth daily.      LIVALO 1 MG Oral Tab Take 1 mg by mouth daily.      pantoprazole 40 MG Oral Tab EC Take 1 tablet (40 mg total) by mouth every morning before breakfast.      losartan 25 MG Oral Tab Take 1 tablet (25 mg total) by mouth daily. 90 tablet 1     metoprolol tartrate 25 MG Oral Tab Take 1 tablet (25 mg total) by mouth 2x Daily(Beta Blocker). 180 tablet 1    aspirin 81 MG Oral Tab EC Take one tab daily Start on 1/3/22 90 tablet 1    Continuous Glucose Sensor (DEXCOM G6 SENSOR) Does not apply Misc 1 each Every 10 days. 9 each 1    glucagon (GVOKE HYPOPEN 2-PACK) 1 MG/0.2ML Subcutaneous injection Inject 0.2 mL (1 mg total) into the skin as needed. 0.4 mL 1    Insulin Pen Needle (BD PEN NEEDLE MARYSE U/F) 32G X 4 MM Does not apply Misc Up to 3 x daily with insulin 300 each 1    Insulin Glargine, 1 Unit Dial, (TOUJEO SOLOSTAR) 300 UNIT/ML Subcutaneous Solution Pen-injector Inject 25 Units into the skin daily. 9 mL 2    Continuous Blood Gluc Transmit (DEXCOM G6 TRANSMITTER) Does not apply Misc 1 each every 3 (three) months. (Patient not taking: Reported on 11/18/2024) 1 each 2    ONETOUCH VERIO In Vitro Strip 1 strip by In Vitro route 4 (four) times daily. 400 strip 0    ONETOUCH DELICA LANCETS FINE Does not apply Misc Use to check blood sugar 2 times daily 200 each 3           DM associated review of  symptoms:   Endocrine: Polyuria, polyphagia, polydipsia: no  Neurological: Paresthesias: yes   + B LE   HEENT: Blurred vision: no  Skin: no rash or wounds  Hematological: Hypoglycemia: no    Review of Systems     LUNGS: denies shortness of breath   CARDIOVASCULAR: denies chest pain  GI: denies abdominal pain, nausea or diarrhea   : denies dysuria  MUSCULOSKELETAL: denies pain    Physical exam:  /66   Pulse 70   Resp 16   Wt 171 lb 9.6 oz (77.8 kg)   SpO2 97%   BMI 27.91 kg/m²   Body mass index is 27.91 kg/m².      Physical Exam   Vitals reviewed.  Constitutional: Normal appearance   Cardiovascular: Normal rate , rhythm   Pulmonary/Chest: Effort normal  Neurological: Alert and oriented .   Psychiatric: Normal mood and affect.        Assessment/Plan:    Hypertension   Well controlled but needs ongoing monitoring   CPM      Neuropathy  Still active  symptoms   Reminded patient health impact w  persistent hyperglycemia     Dyslipidemia    Lab ; ordered update   Continue livalo/zetia per PCP/cardiology    CAD  NO GLP1 rx due to recurrent pancreatitis   Continue SGLT2i rx  Discussed importance of glycemic control for health     Type 2 diabetes mellitus with hyperglycemia, with long-term current use of insulin ([facility])  A1C 9.0% ( last A1C 9.3% )  Weight 171 lb  ( last weight: 172 lb )  Diabetes control is still poor; missing novolog  Has diabetes distress.     Recommendations:   Discussed insulin pump to decrease time/burden patient spending on managing Diabetes   Will meet w CDE for pump options w Dexcom G7     Reviewed with patient health impact associated with high glucose trends and the importance of better glucose control to prevent onset /progression of Diabetes complications such as   retinopathy, neuropathy, nephropathy and cardiovascular disease.    Reminded patient given her longevity of T2DM and recurrent pancreatitis, her beta cell activity is most likely limited , requiring increased need for insulin      Needs medicare pump labs : cpeptide, CMP and ICA per medicare if pursues traditional tubing pump     Reminded patient to dose Novolog AT LEAST ONE meal per day   NOVOLOG flex pen: 5 units   200-240, +1 units   241-280 , + 2 units   Over 281, +3 units     Continue  Dexcom G7 ([facility])   Toujeo solostar : 25 units once daily   Farxiga 10mg once daily       Prefers simplified insuln dosing for meals; even if transitioning to pump  NOT interested in carb counting    Reviewed clinical signifiance of A1c, adverse effects of suboptimal glucose control, and goals of therapy   Discussed the A1C test, what the value reflects and the goal for the patient.   Discussed w patient glucose targets (Fasting < 130 and post prandial <180 ) and complications associated with hyperglycemia and uncontrolled DM (on AVS)   Recommend SMBG checks 2 x daily (fasting  and varying post prandial times)  Continue with lifestyle modifications due to positive impact on diabetes/blood sugars/health (portion control, physical activity, weight loss)     CHO goals for physical activity and weight loss discussed.  Recommended for  patient to follow up in Diabetes center to review carb goals, food label reading, and offer further support/guidance with exercise planning and weight loss.   Reviewed hypoglycemia signs/symptoms, treatment using the Rule of 15' and when to call DM center . (on AVS)   For hypoglycemia emergency, Glucagon rx: gvoke rx  ()   CDE appointment in 2 weeks to discuss pump options   Intro to pump brochure provided to  patient today     The patient is asked to return in 3m  but recommended to contact DM clinic sooner if questions or concerns.    The patient indicates understanding of these issues and agrees to the plan.    Diabetes complications & risks surveillance:   A1C/Blood pressure: as reported    Last dilated eye exam: No data recorded Exam shows retinopathy? No data recorded  Last diabetic foot exam: Last Foot Exam: 24  Nephropathy screenin-  continue arb rx.    Lab Results   Component Value Date    EGFRCR 99 2024    MICROALBCREA 736.3 (H) 2024     LIPID screening:    Lab Results   Component Value Date    CHOLEST 180 2024     (H) 2024    TRIG 98 2024    HDL 52 2024    FASTING Patient not present 2024    FASTING Patient not present 2024     Cholesterol Lowering Medications            ezetimibe 10 MG Oral Tab    LIVALO 1 MG Oral Tab               Orders Placed This Encounter    Microalb/Creat Ratio, Random Urine     Standing Status:   Future     Number of Occurrences:   1     Standing Expiration Date:   2026     Order Specific Question:   Release to patient     Answer:   Immediate    Hemoglobin A1C     This external order was created through the Results Console.     Order Specific  Question:   Release to patient     Answer:   Immediate    Comp Metabolic Panel (14)     Standing Status:   Future     Number of Occurrences:   1     Standing Expiration Date:   2/26/2026    Lipid Panel     Standing Status:   Future     Number of Occurrences:   1     Standing Expiration Date:   2/26/2026    Islet Cell Cytoplasmic Antibody, IgG     Standing Status:   Future     Number of Occurrences:   1     Standing Expiration Date:   2/26/2026    C-Peptide     Standing Status:   Future     Number of Occurrences:   1     Standing Expiration Date:   2/26/2026    TSH W Reflex To Free T4     Standing Status:   Future     Number of Occurrences:   1     Standing Expiration Date:   2/26/2026     Order Specific Question:   Release to patient     Answer:   Immediate    dapagliflozin (FARXIGA) 10 MG Oral Tab     Sig: Take 1 tablet (10 mg total) by mouth daily.     Dispense:  30 tablet     Refill:  0     LOT [id number] 02/28/2027 4x boxes       Spent 45 min obtaining patient history, evaluating patient, reviewing blood glucose trends, discussing treatment options, lifestyle modifications and completing documentation -this time does not including sensor interpretation time  The risks and benefits of my recommendations, as well as other treatment options were discussed with the patient today. questions were also answered to the best of my knowledge.         Note to patient: The 21 Century Cures Act makes medical notes like these available to patients in the interest of transparency. However, be advised this is a medical document. It is intended as peer to peer communication. It is written in medical language and may contain abbreviations or verbiage that are unfamiliar. It may appear blunt or direct. Medical documents are intended to carry relevant information, facts as evident, and the clinical opinion of the practitioner.

## 2025-02-26 ENCOUNTER — LAB ENCOUNTER (OUTPATIENT)
Dept: LAB | Age: 65
End: 2025-02-26
Attending: NURSE PRACTITIONER
Payer: MEDICARE

## 2025-02-26 ENCOUNTER — OFFICE VISIT (OUTPATIENT)
Dept: ENDOCRINOLOGY CLINIC | Facility: CLINIC | Age: 65
End: 2025-02-26
Payer: MEDICARE

## 2025-02-26 VITALS
SYSTOLIC BLOOD PRESSURE: 112 MMHG | RESPIRATION RATE: 16 BRPM | OXYGEN SATURATION: 97 % | BODY MASS INDEX: 28 KG/M2 | WEIGHT: 171.63 LBS | HEART RATE: 70 BPM | DIASTOLIC BLOOD PRESSURE: 66 MMHG

## 2025-02-26 DIAGNOSIS — E11.65 INADEQUATELY CONTROLLED DIABETES MELLITUS (HCC): Primary | ICD-10-CM

## 2025-02-26 DIAGNOSIS — I25.10 CORONARY ARTERY DISEASE INVOLVING NATIVE CORONARY ARTERY OF NATIVE HEART WITHOUT ANGINA PECTORIS: ICD-10-CM

## 2025-02-26 DIAGNOSIS — Z79.4 TYPE 2 DIABETES MELLITUS WITH HYPERGLYCEMIA, WITH LONG-TERM CURRENT USE OF INSULIN (HCC): ICD-10-CM

## 2025-02-26 DIAGNOSIS — E11.65 TYPE 2 DIABETES MELLITUS WITH HYPERGLYCEMIA, WITH LONG-TERM CURRENT USE OF INSULIN (HCC): Primary | ICD-10-CM

## 2025-02-26 DIAGNOSIS — Z79.4 TYPE 2 DIABETES MELLITUS WITH HYPERGLYCEMIA, WITH LONG-TERM CURRENT USE OF INSULIN (HCC): Primary | ICD-10-CM

## 2025-02-26 DIAGNOSIS — E11.65 TYPE 2 DIABETES MELLITUS WITH HYPERGLYCEMIA, WITH LONG-TERM CURRENT USE OF INSULIN (HCC): ICD-10-CM

## 2025-02-26 DIAGNOSIS — E78.2 MIXED HYPERLIPIDEMIA: ICD-10-CM

## 2025-02-26 DIAGNOSIS — I10 ESSENTIAL HYPERTENSION: ICD-10-CM

## 2025-02-26 DIAGNOSIS — E11.65 INADEQUATELY CONTROLLED DIABETES MELLITUS (HCC): ICD-10-CM

## 2025-02-26 DIAGNOSIS — E08.41 DIABETIC MONONEUROPATHY ASSOCIATED WITH DIABETES MELLITUS DUE TO UNDERLYING CONDITION (HCC): ICD-10-CM

## 2025-02-26 LAB
ALBUMIN SERPL-MCNC: 4.4 G/DL (ref 3.2–4.8)
ALBUMIN/GLOB SERPL: 1.4 {RATIO} (ref 1–2)
ALP LIVER SERPL-CCNC: 56 U/L
ALT SERPL-CCNC: 13 U/L
ANION GAP SERPL CALC-SCNC: 8 MMOL/L (ref 0–18)
AST SERPL-CCNC: 16 U/L (ref ?–34)
BILIRUB SERPL-MCNC: 0.3 MG/DL (ref 0.2–1.1)
BUN BLD-MCNC: 22 MG/DL (ref 9–23)
CALCIUM BLD-MCNC: 9.1 MG/DL (ref 8.7–10.6)
CHLORIDE SERPL-SCNC: 107 MMOL/L (ref 98–112)
CHOLEST SERPL-MCNC: 203 MG/DL (ref ?–200)
CO2 SERPL-SCNC: 26 MMOL/L (ref 21–32)
CREAT BLD-MCNC: 0.76 MG/DL
CREAT UR-SCNC: 68.2 MG/DL
EGFRCR SERPLBLD CKD-EPI 2021: 87 ML/MIN/1.73M2 (ref 60–?)
FASTING PATIENT LIPID ANSWER: YES
FASTING STATUS PATIENT QL REPORTED: YES
GLOBULIN PLAS-MCNC: 3.1 G/DL (ref 2–3.5)
GLUCOSE BLD-MCNC: 140 MG/DL (ref 70–99)
HDLC SERPL-MCNC: 53 MG/DL (ref 40–59)
LDLC SERPL CALC-MCNC: 133 MG/DL (ref ?–100)
MICROALBUMIN UR-MCNC: 25.7 MG/DL
MICROALBUMIN/CREAT 24H UR-RTO: 376.8 UG/MG (ref ?–30)
NONHDLC SERPL-MCNC: 150 MG/DL (ref ?–130)
OSMOLALITY SERPL CALC.SUM OF ELEC: 298 MOSM/KG (ref 275–295)
POTASSIUM SERPL-SCNC: 4.1 MMOL/L (ref 3.5–5.1)
PROT SERPL-MCNC: 7.5 G/DL (ref 5.7–8.2)
SODIUM SERPL-SCNC: 141 MMOL/L (ref 136–145)
TRIGL SERPL-MCNC: 92 MG/DL (ref 30–149)
TSI SER-ACNC: 1.26 UIU/ML (ref 0.55–4.78)
VLDLC SERPL CALC-MCNC: 17 MG/DL (ref 0–30)

## 2025-02-26 PROCEDURE — 95251 CONT GLUC MNTR ANALYSIS I&R: CPT | Performed by: NURSE PRACTITIONER

## 2025-02-26 PROCEDURE — 82570 ASSAY OF URINE CREATININE: CPT | Performed by: NURSE PRACTITIONER

## 2025-02-26 PROCEDURE — 82043 UR ALBUMIN QUANTITATIVE: CPT | Performed by: NURSE PRACTITIONER

## 2025-02-26 PROCEDURE — 80061 LIPID PANEL: CPT

## 2025-02-26 PROCEDURE — 99215 OFFICE O/P EST HI 40 MIN: CPT | Performed by: NURSE PRACTITIONER

## 2025-02-26 PROCEDURE — 84681 ASSAY OF C-PEPTIDE: CPT

## 2025-02-26 PROCEDURE — 80053 COMPREHEN METABOLIC PANEL: CPT

## 2025-02-26 PROCEDURE — 36415 COLL VENOUS BLD VENIPUNCTURE: CPT

## 2025-02-26 PROCEDURE — 86341 ISLET CELL ANTIBODY: CPT

## 2025-02-26 PROCEDURE — 84443 ASSAY THYROID STIM HORMONE: CPT

## 2025-02-26 RX ORDER — DAPAGLIFLOZIN 10 MG/1
10 TABLET, FILM COATED ORAL DAILY
Qty: 30 TABLET | Refills: 0 | COMMUNITY
Start: 2025-02-26 | End: 2025-02-26

## 2025-02-26 RX ORDER — GLUCAGON INJECTION, SOLUTION 1 MG/.2ML
1 INJECTION, SOLUTION SUBCUTANEOUS AS NEEDED
COMMUNITY
Start: 2025-02-26

## 2025-02-26 RX ORDER — ACYCLOVIR 400 MG/1
1 TABLET ORAL
COMMUNITY
Start: 2025-02-26

## 2025-02-26 NOTE — PROGRESS NOTES
-----------------------------  Dexcom Clarity  -----------------------------  Sylvia Kameron    YOB: 1960    Generated at: Wed, Feb 26, 2025 7:12 AM CST    Reporting period: Tue Jan 28, 2025 - Wed Feb 26, 2025  -----------------------------  Glucose Details    Average glucose: 206 mg/dL    GMI: 8.2%    Standard deviation: 61 mg/dL    Coefficient of Variation: 29.6%  -----------------------------  Time in Range    Very High: 24%    High: 38%    In Range: 38%    Low: 0%    Very Low: 0%    Target Range   mg/dL    -----------------------------  Sensor usage    Days with data: 20/30    Time active: 84%    Avg. calibrations per day: 0.0

## 2025-02-26 NOTE — PATIENT INSTRUCTIONS
Your trends are too high     The dexcom is from [facility] :[place]: [place]: [phone]       Medicare does require labs for pump approval      Lets investigate cost of pump -  at 8 am     Meet altagracia Donouhe to learn more about your pump choices (tandem, omni pod)    Be sure blood sugars are under 170 before going for lab (medicare requirement)   If over 170: dose 3-4 units before the lab     Continue :       Farxiga 10 m tab daily in am    Toujeo 25 units nightly    Try to dose the novolog at least once daily to avoid the 200mg/dl       NOVOLOG flex pen: 5 units   200-240, +1 units   241-280 , + 2 units   Over 281, +3 units         American Diabetes Association: blood sugar targets:     Fasting blood sugar (before breakfast) Target:    (ideally less than 110)  2 hours after eating less than 180 (ideally less than  150 )     Call for blood sugars less than  75 or greater than  200 more than 2 times in a week     If you are wearing the sensor, please look at your trends/averages    Recommendations:   GMI (estimated A1C ) target under  7%     Time in range (healthy blood sugar targets) : goal is over 70%   less than 70 : goal is less than 4%   Over 180 : goal is less than 20 %   Over 250: goal is  less than 5%     Watch for low blood sugars: (less than 70 )          Treatment of Low Blood Glucose Action Plan  1. Check blood glucose to be sure that it is low. You cannot  always go by symptoms or how you feel. If in doubt, treat your low blood glucose anyway.  Rule of 15 :     2. Take 15 grams of carbohydrate (carb). Here are some choices:    4 oz. regular fruit juice  3-4 glucose tablets  6 oz. regular soda   7-8 jelly beans    3. Recheck blood glucose after 10-15 minutes. If blood glucose is still low (less than 70 mg/dl) repeat the treatment (step 2).    4. If your next meal is more than one hour away, eat a small snack.    5. If you’re not sure what caused your low blood glucose, call your  healthcare provider.    6. Always check your blood glucose before you drive       To treat a low, I recommend you carry with you easy, pre-portioned treatment for low blood sugars that are 15G of carbs:   - Children sized squeeze pouch applesauce (high fiber + carbs help prevent too high of a spike)  - Small children's sized juicebox- 15g carb --> 4oz juice box  - Glucose tablets from [employer], you can find them near diabetes supplies --> Note, you will need to eat 3-4 tablets to get to 15g of carbs  - Children sized fruit snack pack- look for one with 15 grams of total carbohydrate    AVOID complex carbs, or foods that contain fats along with carbs (like chocolate) can slow the absorption of glucose and should NOT be used to treat an emergency low              Insulin pump -- >    Automated Insulin Delivery (AID) systems contain 3 components: A continuous glucose monitor (CGM), an insulin pump, and an algorithm, that makes automatic adjustments to the pump’s insulin delivery in response to the CGM value and trend. These systems are frequently referred to as hybrid or advanced hybrid closed-loop insulin pumps because they are not fully automated and still require input from the user to manually deliver insulin doses when carbohydrate is consumed    WHAT ARE THE LONG-TERM ADVANTAGES OF AID SYSTEMS?  ~Fewer and less severe episodes of hypoglycemia and hyperglycemia  ~Less worry by reducing the risk of hypoglycemia  ~Increased confidence due to more time in target range  ~Potential for improved A1C  ~Stable overnight glucose and increasing the potential for fasting glucose to remain within target  ~Improved sleep due to more stable glucose readings overnight  ~Reductions in diabetes distress - allowing the individual to not have to THINK about their diabetes self-management ALL the time  ~Allows for more “forgiveness” surrounding meals - if someone cannot count carbs precisely, the pump can compensate by  increasing the insulin (basal and/or bolus) based on the glucose [id number]  ~The ability to match basal needs more physiologically instead of presuming someone’s basal requirements are the same day after day  Reduces the daily tasks required to manage glucose readings

## 2025-02-27 LAB — C-PEPTIDE: 3.1 NG/ML

## 2025-03-04 LAB — PANCREATIC ISLET CELLS: NEGATIVE

## 2025-03-10 ENCOUNTER — DIABETIC EDUCATION (OUTPATIENT)
Facility: CLINIC | Age: 65
End: 2025-03-10
Payer: MEDICARE

## 2025-03-10 ENCOUNTER — TELEPHONE (OUTPATIENT)
Facility: CLINIC | Age: 65
End: 2025-03-10

## 2025-03-10 DIAGNOSIS — E11.65 TYPE 2 DIABETES MELLITUS WITH HYPERGLYCEMIA, WITH LONG-TERM CURRENT USE OF INSULIN (HCC): Primary | ICD-10-CM

## 2025-03-10 DIAGNOSIS — Z79.4 TYPE 2 DIABETES MELLITUS WITH HYPERGLYCEMIA, WITH LONG-TERM CURRENT USE OF INSULIN (HCC): Primary | ICD-10-CM

## 2025-03-10 PROCEDURE — G0108 DIAB MANAGE TRN  PER INDIV: HCPCS | Performed by: DIETITIAN, REGISTERED

## 2025-03-10 RX ORDER — INSULIN PMP CART,AUT,G6/7,CNTR
1 EACH SUBCUTANEOUS
Qty: 10 EACH | Refills: 3 | Status: SHIPPED | OUTPATIENT
Start: 2025-03-10

## 2025-03-10 NOTE — PATIENT INSTRUCTIONS
Julius Ward,    I will be training you on your Omnipod 5 insulin pump.    There are a few items to complete before your training:       Please write down and bring your usernames and passwords for your Omnipod ID and [employer] Email once you have set those up. You will need to access these accounts during training.       Make sure you are in an active Dexcom G7 sensor session before your training. Also make sure you are using the Dexcom G7 ernst on your phone (not the Dexcom G7 ).     Supplies to bring to your training:  Omnipod 5 controller  Smartphone with G6 ernst  Omnipod 5 pod (please bring 2)  Dexcom sensor in use (are out of the 2 hour warm-up period)  Vial or pens of your U-100 rapid acting insulin (Humalog, Novolog, Admelog)    Please skip your basal insulin (Tresiba, Toujeo, Levemir, Lantus, etc.) for whenever you usually take it - either in the morning or in the evening before your scheduled pump training.     Please let us know if you have any questions.    Jayde Au, RD, , LD, CDCES(she/her/hers)  Diabetes Educator  [facility]  [email or web address]  [phone] phone  [phone] Fax

## 2025-03-10 NOTE — PROGRESS NOTES
Sylvia Melo 11/19/1960 was seen for Initial Individual Pump Start Education:  Date: 3/10/2025 Referring Provider: Phylicia Cisneros      Start time: 8:30pm End time: 9:30pm      Visit Summary:   Patient is considering insulin pump therapy, would like to discuss different options that are available.       Blood glucose monitoring: CGM: Dexcom G7      Pump Review:  Taught concept of insulin pump therapy.  Reviewed difference between basal and bolus insulin.  Reviewed different insulin pump options (with tubing, tubeless, uses automated insulin delivery/integrated with CGM).  Discussed how insulin is filled in reservoir/POD with demo samples and is filled every 2-3 days.  Discussed possible risks associated with insulin pump - DKA, importance of changing infusion set/PODs with a bad site.  Reviewed Rule of 15 and plan for hypoglycemia. Ensure glucagon prescription in on file.  Provided Insulin Pump Start Information handout.         Carb Counting  Not reviewed- wants to do standard carbs at meals          Patient Goals:  2. Practice using Omnipod 5 simulator.  4. Download and create accounts for Omnipod:  [employer] and [employer]  5. Follow up to schedule pump start appointment once supplies have been received.      Comments: Would like to pursue omnipod. Wants to check into medicare coverage       Patient verbalized understanding and has no further questions at this time.        Future Appointments   Date Time Provider Department Center   5/28/2025  8:00 AM Phylicia Cisneros APRN [facility]            Jayde Au, RD, , LD, CDCES

## 2025-03-10 NOTE — TELEPHONE ENCOUNTER
Patient met w Diabetes Educator today; prefers to trial Omni pod pump     Omni pod 5 starter kit to patient   Ordered Omni pod 5 pods to pharmacy to check on cost

## 2025-03-24 DIAGNOSIS — E11.42 DIABETIC PERIPHERAL NEUROPATHY (HCC): ICD-10-CM

## 2025-03-24 DIAGNOSIS — E11.65 TYPE 2 DIABETES MELLITUS WITH HYPERGLYCEMIA, WITH LONG-TERM CURRENT USE OF INSULIN (HCC): ICD-10-CM

## 2025-03-24 DIAGNOSIS — Z79.4 TYPE 2 DIABETES MELLITUS WITH HYPERGLYCEMIA, WITH LONG-TERM CURRENT USE OF INSULIN (HCC): ICD-10-CM

## 2025-03-24 RX ORDER — DAPAGLIFLOZIN 10 MG/1
10 TABLET, FILM COATED ORAL DAILY
Qty: 90 TABLET | Refills: 1 | Status: SHIPPED | OUTPATIENT
Start: 2025-03-24 | End: 2025-09-20

## 2025-03-24 RX ORDER — PREGABALIN 50 MG/1
50 CAPSULE ORAL 3 TIMES DAILY
Qty: 90 CAPSULE | Refills: 2 | Status: CANCELLED | OUTPATIENT
Start: 2025-03-24

## 2025-03-24 NOTE — TELEPHONE ENCOUNTER
Patient left message requesting refill on pregabalin (previously written by HAMILTON Luong) and Farxiga to [facility].    Last office visit 02/2025  Future Appointments   Date Time Provider Department Center   5/28/2025  8:00 AM Phylicia Cisneros APRN [facility]     Last A1c value was 9.1% done 2/14/2025.

## 2025-03-24 NOTE — TELEPHONE ENCOUNTER
Call with patient, pharmacy cost was $588 for first month.  Patient reports she can not afford.  Discussed medicare part D changes and possibility of enrolling in monthly payments, but patient declined at this time.  Will continue with current diabetes regimen.

## 2025-03-24 NOTE — TELEPHONE ENCOUNTER
Call with patient, she will contact Primary Care Physician for lyrica prescription.  She can not afford to move forward with OP5 pump.  First month was $588 out of pocket. She is not interested in enrolling in monthly payment plan at this time.

## 2025-03-26 ENCOUNTER — TELEPHONE (OUTPATIENT)
Dept: ENDOCRINOLOGY CLINIC | Facility: CLINIC | Age: 65
End: 2025-03-26

## 2025-03-26 NOTE — TELEPHONE ENCOUNTER
Received fax from [employer] for patient Dapaglifrozin 10 mg they dispsensed a 30 day supply for patient- they only cover Jardiance or Farxiga.    LVM for pharmacy to run has brand name only for that medication.

## 2025-03-31 ENCOUNTER — TELEPHONE (OUTPATIENT)
Dept: FAMILY MEDICINE | Age: 65
End: 2025-03-31

## 2025-04-03 ENCOUNTER — TELEPHONE (OUTPATIENT)
Dept: FAMILY MEDICINE | Age: 65
End: 2025-04-03

## 2025-04-03 DIAGNOSIS — Z12.31 VISIT FOR SCREENING MAMMOGRAM: ICD-10-CM

## 2025-04-15 ENCOUNTER — PATIENT MESSAGE (OUTPATIENT)
Dept: ENDOCRINOLOGY CLINIC | Facility: CLINIC | Age: 65
End: 2025-04-15

## 2025-04-15 NOTE — TELEPHONE ENCOUNTER
Reivewed dexcom  Declined Omni pod due to cost  Cpeptide too high for tubed pump per  medicare requirements   My chart message sent to patient to remind importance of novolog doses

## 2025-04-16 ENCOUNTER — TELEPHONE (OUTPATIENT)
Facility: CLINIC | Age: 65
End: 2025-04-16

## 2025-04-16 ENCOUNTER — NURSE ONLY (OUTPATIENT)
Facility: CLINIC | Age: 65
End: 2025-04-16
Payer: MEDICARE

## 2025-04-16 DIAGNOSIS — E11.65 TYPE 2 DIABETES MELLITUS WITH HYPERGLYCEMIA, WITH LONG-TERM CURRENT USE OF INSULIN (HCC): Primary | ICD-10-CM

## 2025-04-16 DIAGNOSIS — Z79.4 TYPE 2 DIABETES MELLITUS WITH HYPERGLYCEMIA, WITH LONG-TERM CURRENT USE OF INSULIN (HCC): Primary | ICD-10-CM

## 2025-04-16 RX ORDER — DAPAGLIFLOZIN 5 MG/1
5 TABLET, FILM COATED ORAL DAILY
Qty: 35 TABLET | Refills: 0 | COMMUNITY
Start: 2025-04-16 | End: 2025-05-21

## 2025-04-16 NOTE — TELEPHONE ENCOUNTER
Patient called back, reviewed Medicare part D prescription changes.  Patient will  samples today in [place] before 4 pm, address provided.  Patient wondering if there is another oral medication she can use that is more affordable and won't cause pancreatitis (reports having 3x).  Discuss further at next visit?    Future Appointments   Date Time Provider Department Center   5/28/2025  8:00 AM Phylicia Cisneros, [facility]     Last A1c value was 9.1% done 2/14/2025.

## 2025-04-16 NOTE — TELEPHONE ENCOUNTER
Patient left message stating now that she has medicare, her Farxiga 10 mg tablet is over $400 at pharmacy.  Asked if [facility] had samples available.    Last office visit 02/26/2025  Future Appointments   Date Time Provider Department Center   5/28/2025  8:00 AM Phylicia Cisneros APRN [facility]     Last A1c value was 9.1% done 2/14/2025.

## 2025-04-16 NOTE — TELEPHONE ENCOUNTER
Limited generic prescription options w Diabetes  2 choices:   We could try low dose glipizide however unsure of response since she has had multiple pandreatitis episodes (renders beta cells damaged)   And could try pioglitazone 15mg once daily (offers some ASCVD benefit) and ok to use w hx pancreatitis --> may help improve insulin resistance.     Preferred: using SGLT2 for ASCVD benefit and Diabetic nephropathy   (Last urine /alb > 300)     Recommended AID, Omni pod however cost barrier

## 2025-04-16 NOTE — TELEPHONE ENCOUNTER
Discussed medicare part D again at .  Patient appreciative of samples.       Future Appointments   Date Time Provider Department Center   5/28/2025  8:00 AM Phylicia Cisneros APRN [facility]

## 2025-04-16 NOTE — TELEPHONE ENCOUNTER
Ok for sample but this cost may be medicare deductible     2025 According to CMS/Medicare website:       The Medicare donut hole, ( Prescription Drugs Coverage Gap), will be eliminated in 2025. Instead, Medicare Part D will have a $2,000 cap on out-of-pocket spending for prescription drugs:       Deductible phase: Pay 100% of prescription drug costs until you reach the Part D deductible, which is ~  $590 in 2025    Initial coverage: Pay 25% coinsurance for covered drugs until you reach the $2,000 out-of-pocket limit    Catastrophic: After reaching the $2,000 limit, you won't pay out-of-pocket for covered Part D drugs for the rest of the year      The Inflation Reduction Act (SOL) of 2022 made this change. The goal is to lower prescription drug expenses, but deductibles or premiums may be higher in 2025.         I am hoping this helps many of our patients next year starting or even staying on GLP1, SGLT2 for diabetes!! Of course,  that first deductible payment will be a shocker!

## 2025-04-16 NOTE — TELEPHONE ENCOUNTER
Left recorded message to call office.  Please schedule  time for samples and review Medicare guidelines when patient calls back.  Samples in closet for patient.

## 2025-04-17 ENCOUNTER — APPOINTMENT (OUTPATIENT)
Dept: FAMILY MEDICINE | Age: 65
End: 2025-04-17

## 2025-04-30 ENCOUNTER — APPOINTMENT (OUTPATIENT)
Dept: FAMILY MEDICINE | Age: 65
End: 2025-04-30

## 2025-04-30 VITALS
BODY MASS INDEX: 29.94 KG/M2 | OXYGEN SATURATION: 97 % | DIASTOLIC BLOOD PRESSURE: 76 MMHG | HEIGHT: 64 IN | WEIGHT: 175.38 LBS | SYSTOLIC BLOOD PRESSURE: 132 MMHG | HEART RATE: 86 BPM

## 2025-04-30 DIAGNOSIS — E11.59 TYPE 2 DIABETES MELLITUS WITH CARDIAC COMPLICATION (CMD): ICD-10-CM

## 2025-04-30 DIAGNOSIS — I25.10 ATHEROSCLEROSIS OF NATIVE CORONARY ARTERY OF NATIVE HEART WITHOUT ANGINA PECTORIS: ICD-10-CM

## 2025-04-30 DIAGNOSIS — I10 BENIGN ESSENTIAL HTN: ICD-10-CM

## 2025-04-30 DIAGNOSIS — E66.09 CLASS 1 OBESITY DUE TO EXCESS CALORIES WITH SERIOUS COMORBIDITY AND BODY MASS INDEX (BMI) OF 30.0 TO 30.9 IN ADULT: ICD-10-CM

## 2025-04-30 DIAGNOSIS — E78.2 MIXED HYPERLIPIDEMIA: ICD-10-CM

## 2025-04-30 DIAGNOSIS — Z95.1 S/P CABG X 4: ICD-10-CM

## 2025-04-30 DIAGNOSIS — F17.210 CIGARETTE SMOKER: ICD-10-CM

## 2025-04-30 DIAGNOSIS — Z00.00 MEDICARE ANNUAL WELLNESS VISIT, INITIAL: Primary | ICD-10-CM

## 2025-04-30 DIAGNOSIS — E66.811 CLASS 1 OBESITY DUE TO EXCESS CALORIES WITH SERIOUS COMORBIDITY AND BODY MASS INDEX (BMI) OF 30.0 TO 30.9 IN ADULT: ICD-10-CM

## 2025-04-30 RX ORDER — PITAVASTATIN CALCIUM 1.04 MG/1
1 TABLET, FILM COATED ORAL DAILY
Qty: 90 TABLET | Refills: 3 | Status: SHIPPED | OUTPATIENT
Start: 2025-04-30 | End: 2026-04-30

## 2025-04-30 ASSESSMENT — MINI COG
PATIENT WAS GIVEN REPEAT BACK WORDS FROM VERSION: 1 - BANANA SUNRISE CHAIR
PATIENT ABLE TO FILL IN THE CLOCK FACE WITH 10 MINUTES PAST 11 O'CLOCK?: YES, CLOCK IS CORRECT
TOTAL SCORE: 5
PATIENT ABLE TO REPEAT THE 3 WORDS GIVEN PREVIOUSLY?: WAS ABLE TO REPEAT BACK 3 WORDS CORRECTLY

## 2025-04-30 ASSESSMENT — PATIENT HEALTH QUESTIONNAIRE - PHQ9
2. FEELING DOWN, DEPRESSED OR HOPELESS: NOT AT ALL
SUM OF ALL RESPONSES TO PHQ9 QUESTIONS 1 AND 2: 0
CLINICAL INTERPRETATION OF PHQ2 SCORE: NO FURTHER SCREENING NEEDED
SUM OF ALL RESPONSES TO PHQ9 QUESTIONS 1 AND 2: 0
1. LITTLE INTEREST OR PLEASURE IN DOING THINGS: NOT AT ALL

## 2025-04-30 ASSESSMENT — VISUAL ACUITY
OS_CC: 20/20
OD_CC: 20/20

## 2025-06-02 ENCOUNTER — TELEPHONE (OUTPATIENT)
Dept: LAB | Age: 65
End: 2025-06-02

## 2025-06-04 ENCOUNTER — OFFICE VISIT (OUTPATIENT)
Dept: ENDOCRINOLOGY CLINIC | Facility: CLINIC | Age: 65
End: 2025-06-04
Payer: MEDICARE

## 2025-06-04 VITALS
OXYGEN SATURATION: 100 % | BODY MASS INDEX: 28 KG/M2 | WEIGHT: 171.63 LBS | RESPIRATION RATE: 16 BRPM | DIASTOLIC BLOOD PRESSURE: 64 MMHG | HEART RATE: 74 BPM | SYSTOLIC BLOOD PRESSURE: 116 MMHG

## 2025-06-04 DIAGNOSIS — E11.59 HYPERTENSION ASSOCIATED WITH DIABETES (HCC): ICD-10-CM

## 2025-06-04 DIAGNOSIS — I25.10 CORONARY ARTERY DISEASE INVOLVING NATIVE CORONARY ARTERY OF NATIVE HEART, UNSPECIFIED WHETHER ANGINA PRESENT: ICD-10-CM

## 2025-06-04 DIAGNOSIS — Z79.4 TYPE 2 DIABETES MELLITUS WITH HYPERGLYCEMIA, WITH LONG-TERM CURRENT USE OF INSULIN (HCC): Primary | ICD-10-CM

## 2025-06-04 DIAGNOSIS — I15.2 HYPERTENSION ASSOCIATED WITH DIABETES (HCC): ICD-10-CM

## 2025-06-04 DIAGNOSIS — E78.5 DYSLIPIDEMIA ASSOCIATED WITH TYPE 2 DIABETES MELLITUS (HCC): ICD-10-CM

## 2025-06-04 DIAGNOSIS — E11.69 DYSLIPIDEMIA ASSOCIATED WITH TYPE 2 DIABETES MELLITUS (HCC): ICD-10-CM

## 2025-06-04 DIAGNOSIS — E11.65 TYPE 2 DIABETES MELLITUS WITH HYPERGLYCEMIA, WITH LONG-TERM CURRENT USE OF INSULIN (HCC): Primary | ICD-10-CM

## 2025-06-04 LAB — HEMOGLOBIN A1C: 9.2 % (ref 4.3–5.6)

## 2025-06-04 PROCEDURE — 99215 OFFICE O/P EST HI 40 MIN: CPT | Performed by: NURSE PRACTITIONER

## 2025-06-04 PROCEDURE — 83036 HEMOGLOBIN GLYCOSYLATED A1C: CPT | Performed by: NURSE PRACTITIONER

## 2025-06-04 PROCEDURE — 95251 CONT GLUC MNTR ANALYSIS I&R: CPT | Performed by: NURSE PRACTITIONER

## 2025-06-04 RX ORDER — DAPAGLIFLOZIN 10 MG/1
10 TABLET, FILM COATED ORAL DAILY
COMMUNITY
Start: 2025-05-04

## 2025-06-04 RX ORDER — GLIPIZIDE 5 MG/1
5 TABLET, FILM COATED, EXTENDED RELEASE ORAL DAILY
Qty: 90 TABLET | Refills: 1 | Status: SHIPPED | OUTPATIENT
Start: 2025-06-04

## 2025-06-04 NOTE — PATIENT INSTRUCTIONS
A1C 9.2% ( last A1C 9.1%)     Please call your [employer] plan to discuss payment plan for your prescriptions       And ask what your annual deductible for your prescriptions so you know what to expect annually   Contact Tiffanie Moreno: [place]: [phone]          Start Glipizide XL 5 mg once daily   This medication will help your pancreas make more insulin   We will have to see how effective this medication for you -     Continue       Novolog AT LEAST ONE meal per day  (dinner)   NOVOLOG flex pen: 5 units   200-240, +1 units   241-280 , + 2 units   Over 281, +3 units       Dexcom G7    Toujeo solostar : 25 units once daily   Farxiga 10mg once daily         American Diabetes Association: blood sugar targets:     Fasting blood sugar (before breakfast) Target:    (ideally less than 110)  2 hours after eating less than 180 (ideally less than  150 )     Call for blood sugars less than  75 or greater than  200 more than 2 times in a week     If you are wearing the sensor, please look at your trends/averages    Recommendations:   GMI (estimated A1C ) target under  7%     Time in range (healthy blood sugar targets) : goal is over 70%   less than 70 : goal is less than 4%   Over 180 : goal is less than 20 %   Over 250: goal is  less than 5%     Watch for low blood sugars: (less than 70 )    Treatment of Low Blood Glucose Action Plan  1. Check blood glucose to be sure that it is low. You cannot  always go by symptoms or how you feel. If in doubt, treat your low blood glucose anyway.  Rule of 15 :     2. Take 15 grams of carbohydrate (carb). Here are some choices:    4 oz. regular fruit juice  3-4 glucose tablets  6 oz. regular soda   7-8 jelly beans    3. Recheck blood glucose after 10-15 minutes. If blood glucose is still low (less than 70 mg/dl) repeat the treatment (step 2).    4. If your next meal is more than one hour away, eat a small snack.    5. If you’re not sure what caused your low blood glucose, call  your healthcare provider.    6. Always check your blood glucose before you drive       To treat a low, I recommend you carry with you easy, pre-portioned treatment for low blood sugars that are 15G of carbs:   - Children sized squeeze pouch applesauce (high fiber + carbs help prevent too high of a spike)  - Small children's sized juicebox- 15g carb --> 4oz juice box  - Glucose tablets from [employer], you can find them near diabetes supplies --> Note, you will need to eat 3-4 tablets to get to 15g of carbs  - Children sized fruit snack pack- look for one with 15 grams of total carbohydrate    AVOID complex carbs, or foods that contain fats along with carbs (like chocolate) can slow the absorption of glucose and should NOT be used to treat an emergency low

## 2025-06-04 NOTE — PROGRESS NOTES
-----------------------------  Dexcom Clarity  -----------------------------  Sylvia Kameron    YOB: 1960    Generated at: Wed, Jun 4, 2025 9:24 AM CDT    Reporting period: Thu Apr 24, 2025 - Fri May 23, 2025  -----------------------------  Glucose Details    Average glucose: 215 mg/dL    GMI: 8.5%    Standard deviation: 68 mg/dL    Coefficient of Variation: 31.7%  -----------------------------  Time in Range    Very High: 29%    High: 37%    In Range: 34%    Low: 0%    Very Low: 0%    Target Range   mg/dL    -----------------------------  Sensor usage    Days with data: 25/30    Time active: 89%    Avg. calibrations per day: 0.0

## 2025-06-04 NOTE — PROGRESS NOTES
Chief Complaint   Patient presents with    Diabetes     Follow up Dexcom last DL 2025      Sylvia is a 64 year old  presenting for type 2  DM medication management.   Primary care physician: Charisma Olivarez MD  Last Diabetes appointment with me:    Investigated insulin pump and met w Vinay however cost was barrier   Cpeptide lab precluded her from traditional insulin pump   Reports dosing novolog 5 units at dinner  Admits burden managing Diabetes.   Frustrated that her prescriptions are so costly . 30 d supply of Farxiga was $500        Today's A1C  9.2 % ( last A1C 9.1 %)   Not currently wearing dexcom --> current sensor failed. 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)           Diabetes History:  Type 2 DM ~    Patient has not had hospitalizations for blood sugar issues  has history of pancreatitis x 3 (, , )       Previous DM therapies:  Ozempic:   pancreatitis   Metformin ER/IR  - severe nausea and diarrhea  Januvia - pancreatitis (2017)  Jardiance (10 mg)- yeast infetdion   Novolog - reported  hypoglycemia    Current DM Regimen:  Farxiga 10 m tab daily in am  Toujeo 25 units once daily    NOVOLOG flex pen: 5 units w B/D meals plus scale -->at dinner       200-240, +1 units   241-280 , + 2 units   Over 281, +3 units     HGBA1C:    Lab Results   Component Value Date    A1C 9.2 (A) 2025    A1C 9.1 2025    A1C 9.0 (A) 2024     (H) 2024           Lab Results   Component Value Date    CHOLEST 203 (H) 2025    CHOLEST 180 2024    TRIG 92 2025    TRIG 98 2024    HDL 53 2025    HDL 52 2024     (H) 2025      (H) 2024     Lab Results   Component Value Date    MICROALBCREA 376.8 (H) 2025    MICROALBCREA 736.3 (H) 2024      Lab Results   Component Value Date    CREATSERUM 0.76 2025    CREATSERUM 0.61 2024    EGFRCR 87 2025    EGFRCR 99 2024     Lab Results   Component Value Date    AST 16 2025    AST 20 2024    ALT 13 2025    ALT 15 2024       Lab Results   Component Value Date    TSH 1.256 2025    TSH 1.300 2024    T4F 1.19 09/10/2019     Component      Latest Ref Rng 2025   Pancreatic Islet Cells      Neg:<1:1  Negative    C-PEPTIDE      1.1 - 4.4 ng/mL 3.1        DM Complications:  Microvascular:   Neuropathy: yes  Retinopathy: no  Nephropathy: yes    Macrovascular:  PVD: no  CAD: yes  HX CAD, CABG    Stroke/CVA: no    Modifying factors:  Medication adherence: No missing NOVOLOG doses    Barriers :cost w Medicare   Recent steroids, illness or infections (past 90d) : no     Allergies: Statins, Ozempic (0.25 or 0.5 mg-dose) [semaglutide], Lisinopril, and Seasonal    Past Medical History:    Back problem    cervical radiculopathy C4-C7    Diabetes (HCC)    High blood pressure    High cholesterol    Pancreatitis (HCC)    caused by new diabetic medication    Visual impairment    glasses     Past Surgical History:   Procedure Laterality Date    Appendectomy            Carpal tunnel release  2018    LEFT CARPAL TUNNEL RELEASE, Dr. Torres    Colonoscopy N/A 2017    Procedure: COLONOSCOPY;  Surgeon: Tristin Sanchez MD;  Location:  ENDOSCOPY     Social History     Socioeconomic History    Marital status:    Tobacco Use    Smoking status: Former     Current packs/day: 0.00     Types: Cigarettes     Start date: 1984     Quit date: 2021     Years since quitting: 3.4    Smokeless tobacco: Never    Tobacco comments:     1-2 cigarettes/day   Vaping Use    Vaping status: Never Used   Substance  and Sexual Activity    Alcohol use: Yes     Comment: social, wine only    Drug use: No   Other Topics Concern    Caffeine Concern No     Comment: 1 cup daily    Exercise Yes     Comment: 1-2 days weekly     Social Drivers of Health     Food Insecurity: Low Risk  (2/12/2025)    Received from [facility]    Food Insecurity     Within the past 12 months, you worried that your food would run out before you got money to buy more.  : Never true     Within the past 12 months, the food you bought just didn't last and you didn't have money to get more. : Never true   Transportation Needs: Not At Risk (2/12/2025)    Received from [facility]    Transportation Needs     In the past 12 months, has lack of reliable transportation kept you from medical appointments, meetings, work or from getting things needed for daily living? : No   Housing Stability: Not At Risk (2/2/2024)    Received from [facility]     What is your living situation today?: I have a steady place to live     Think about the place you live. Do you have problems with any of the following?: None of the above     Family History   Problem Relation Age of Onset    Hypertension Father     Hypertension Mother     Diabetes Maternal Grandmother      Current Medication List:   Current Outpatient Medications   Medication Sig Dispense Refill    FARXIGA 10 MG Oral Tab Take 1 tablet (10 mg total) by mouth daily.      glipiZIDE ER 5 MG Oral Tablet 24 Hr Take 1 tablet (5 mg total) by mouth daily. 90 tablet 1    insulin aspart (NOVOLOG FLEXPEN) 100 Units/mL Subcutaneous Solution Pen-injector Uses up to 15 units daily as directed in divided doses 15 mL 0    Insulin Glargine, 1 Unit Dial, (TOUJEO SOLOSTAR) 300 UNIT/ML Subcutaneous Solution Pen-injector Inject 25 Units into the skin daily. 9 mL 2    pregabalin 50 MG Oral Cap Take 1 capsule (50 mg total) by mouth 3 (three) times daily. 90 capsule 2    ezetimibe 10 MG Oral Tab Take 1 tablet (10  mg total) by mouth daily.      pantoprazole 40 MG Oral Tab EC Take 1 tablet (40 mg total) by mouth every morning before breakfast.      losartan 25 MG Oral Tab Take 1 tablet (25 mg total) by mouth daily. 90 tablet 1    aspirin 81 MG Oral Tab EC Take one tab daily Start on 1/3/22 90 tablet 1    Insulin Disposable Pump (OMNIPOD 5 [id number] PODS GEN 5) Does not apply Misc 1 Device every 3 (three) days. 10 each 3    Continuous Glucose Sensor (DEXCOM G7 SENSOR) Does not apply Misc 1 each Every 10 days.      glucagon (GVOKE HYPOPEN 2-PACK) 1 MG/0.2ML Subcutaneous injection Inject 0.2 mL (1 mg total) into the skin as needed.      Insulin Pen Needle (BD PEN NEEDLE MARYSE U/F) 32G X 4 MM Does not apply Misc Up to 3 x daily with insulin 300 each 1    LIVALO 1 MG Oral Tab Take 1 mg by mouth daily.      metoprolol tartrate 25 MG Oral Tab Take 1 tablet (25 mg total) by mouth 2x Daily(Beta Blocker). 180 tablet 1    ONETOUCH VERIO In Vitro Strip 1 strip by In Vitro route 4 (four) times daily. 400 strip 0    ONETOUCH DELICA LANCETS FINE Does not apply Misc Use to check blood sugar 2 times daily 200 each 3           DM associated review of  symptoms:   Endocrine: Polyuria, polyphagia, polydipsia: no  Neurological: Paresthesias: yes   + B LE   HEENT: Blurred vision: no  Skin: no rash or wounds  Hematological: Hypoglycemia: no    Review of Systems     LUNGS: denies shortness of breath   CARDIOVASCULAR: denies chest pain  GI: denies abdominal pain, nausea,  diarrhea ,  constipation   : denies dysuria      Physical exam:  /64   Pulse 74   Resp 16   Wt 171 lb 9.6 oz (77.8 kg)   SpO2 100%   BMI 27.91 kg/m²   Body mass index is 27.91 kg/m².      Physical Exam   Vitals reviewed.  Constitutional: Normal appearance   Cardiovascular: Normal rate   Pulmonary/Chest: Effort normal  Neurological: Alert and oriented .   Psychiatric: Normal mood and affect.        Assessment/Plan:    Hypertension   Well controlled but needs ongoing  monitoring   CPM     Dyslipidemia   Cholesterol: 203, done on 2/26/2025.  HDL Cholesterol: 53, done on 2/26/2025.  TriGlycerides 92, done on 2/26/2025.  LDL Cholesterol: 133, done on 2/26/2025.      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  Has diabetes distress. Would benefit from AID insulin pump but Medicare labs did not qualify   Omni pod is prescription benefit and cost for 1m supply was too high  However we discussed MED part D plan offering monthly payment options for 2000 annual max out of pocket ( ~ $167 /month)   If she can opt in for payment plans, she would like to proceed w Omni pod 5 pump   ~will update us       Reminded patient given her longevity of T2DM and recurrent pancreatitis, her beta cell activity is most likely limited , requiring increased need for insulin but will see if + response w Glipizide   Start glipizide XL 5mg once daily in AM w first meal of day   ~will assess dexcom in 2-3 weeks to see if Blood sugar improves.       Continue   Dexcom G7 ([facility])   Toujeo solostar : 25 units once daily   Farxiga 10mg once daily   Reminded patient to dose Novolog AT LEAST ONE meal per day (DINNER)   NOVOLOG flex pen: 5 units   200-240, +1 units   241-280 , + 2 units    Over 281, +3 units         Prefers simplified insuln dosing for meals; even if transitionins to pump ( NOT interested in carb counting)     Reviewed clinical signifiance of A1c, adverse effects of suboptimal glucose control, and goals of therapy   Discussed the A1C test, what the value reflects and the goal for the patient.   Discussed w patient glucose targets (Fasting < 130 and post prandial <180 ) and complications associated with hyperglycemia and uncontrolled DM (on AVS)   Recommend SMBG checks 2 x daily (fasting and varying post prandial times)  Continue with lifestyle modifications due to positive impact on diabetes/blood sugars/health (portion control, physical activity, weight loss)     Recommended for  patient to follow up in Diabetes center to review carb goals, food label reading, and offer further support/guidance with exercise planning and weight loss.   Reviewed hypoglycemia signs/symptoms, treatment using the Rule of 15' and when to call DM center . (on AVS)   For hypoglycemia emergency, Glucagon rx: gvoke rx  (2024)   6 week telehealth check in and in office appointment in   but recommended to contact DM clinic sooner if questions or concerns.    The patient indicates understanding of these issues and agrees to the plan.    Diabetes complications & risks surveillance:   A1C/Blood pressure: as reported    Last dilated eye exam: No data recorded Exam shows retinopathy? No data recorded  Last diabetic foot exam: No data recorded  Nephropathy screening:   Continue arb rx.    Lab Results   Component Value Date    EGFRCR 87 02/26/2025    MICROALBCREA 376.8 (H) 02/26/2025     LIPID screening:    Lab Results   Component Value Date    CHOLEST 203 (H) 02/26/2025     (H) 02/26/2025    TRIG 92 02/26/2025    HDL 53 02/26/2025    FASTING Yes 02/26/2025    FASTING Yes 02/26/2025     Cholesterol Lowering Medications            ezetimibe 10 MG Oral Tab    LIVALO 1 MG Oral Tab               Orders Placed  This Encounter    POC Hemoglobin A1C     Release to patient:   Immediate    FARXIGA 10 MG Oral Tab     Sig: Take 1 tablet (10 mg total) by mouth daily.    glipiZIDE ER 5 MG Oral Tablet 24 Hr     Sig: Take 1 tablet (5 mg total) by mouth daily.     Dispense:  90 tablet     Refill:  1       Defer foot exam to follow up appointment    Assisted patient with change in Dexcom sensor - placed on abdomen     Code selection for this visit was based on time spent (47  min ) on date of service in preparing to see the patient, obtaining and/or reviewing separately obtained history,evaluating patient, reviewing blood glucose trends/patterns, discussing treatment options performing a medically appropriate examination, counseling and educating the patient/family/caregiver, ordering medications or testing, referring and communicating with other healthcare providers, documenting clinical information in the EHR, independently interpreting results and communicating results to the patient/family/caregiver and care coordination with the patient's other providers.  Note: This time does NOT include CGM interpretation time   The risks and benefits of my recommendations, as well as other treatment options were discussed with the patient today. questions were also answered to the best of my knowledge.     Note to patient: The 21 Century Cures Act makes medical notes like these available to patients in the interest of transparency. However, be advised this is a medical document. It is intended as peer to peer communication. It is written in medical language and may contain abbreviations or verbiage that are unfamiliar. It may appear blunt or direct. Medical documents are intended to carry relevant information, facts as evident, and the clinical opinion of the practitioner.

## 2025-06-18 ENCOUNTER — PATIENT MESSAGE (OUTPATIENT)
Dept: ENDOCRINOLOGY CLINIC | Facility: CLINIC | Age: 65
End: 2025-06-18

## 2025-06-19 NOTE — TELEPHONE ENCOUNTER
Call with Sylvia, reviewed APRN message to patient.  She did start glipizide as directed.  She has been consistent with the basal insulin, but not the prandial insulin.  Patient denies symptoms, aware if she develops abdominal pain, nausea, vomiting to report to ED.  Patient reports she \"knows what she needs to do\",  but isn't currently doing it.

## 2025-06-19 NOTE — TELEPHONE ENCOUNTER
Patient has not yet read my chart message   : please call to discuss dexcom trends and need for more consistent prandial insulni   Appears glipizide is not helping lower Blood sugar (if she started)       GLENN Ward      I reviewed your dexcom and your trends are not doing well - I have copied the summary below for your reference.   Your trends are averaging over 250mg/dl frm 1pm to 12MN which is not good for your health      At your last visit, I sent prescription for glipizide. Did you start this prescription?   As previously discussed, you really need to be more consistent w your insulin doses to avoid the Blood sugar trends over 200       Phylicia       This MyChart message has not been read.     No questionnaires available.

## 2025-06-30 RX ORDER — GLIPIZIDE 5 MG/1
5 TABLET, FILM COATED, EXTENDED RELEASE ORAL DAILY
Qty: 90 TABLET | Refills: 1 | OUTPATIENT
Start: 2025-06-30

## 2025-06-30 NOTE — TELEPHONE ENCOUNTER
Review of dexcom on 6- indicated Blood sugar trends not improved since starting glipizide  Really no need to continue as patient needs insulin to maintain glycemic targets.

## 2025-06-30 NOTE — TELEPHONE ENCOUNTER
Received refill for [facility] script 90 day script pended     Requested Prescriptions     Pending Prescriptions Disp Refills    glipiZIDE ER 5 MG Oral Tablet 24 Hr 90 tablet 1     Sig: Take 1 tablet (5 mg total) by mouth daily.     Future Appointments   Date Time Provider Department Center   7/24/2025 11:45 AM Phylicia Cisneros APRN [facility]   10/15/2025 10:00 AM Phylicia Cisneros APRN [facility]     Last A1c value was 9.2% done 6/4/2025.  Refill 06/04/25 Mike   LOV 06/04/2025 Mike

## 2025-07-01 ENCOUNTER — TELEPHONE (OUTPATIENT)
Dept: FAMILY MEDICINE | Age: 65
End: 2025-07-01

## 2025-07-01 DIAGNOSIS — E78.2 MIXED HYPERLIPIDEMIA: ICD-10-CM

## 2025-07-01 RX ORDER — PITAVASTATIN CALCIUM 1.04 MG/1
1 TABLET, FILM COATED ORAL DAILY
Qty: 90 TABLET | Refills: 3 | Status: SHIPPED | OUTPATIENT
Start: 2025-07-01 | End: 2026-07-01

## 2025-07-07 ENCOUNTER — TELEPHONE (OUTPATIENT)
Dept: INTERNAL MEDICINE | Age: 65
End: 2025-07-07

## 2025-07-24 ENCOUNTER — TELEMEDICINE (OUTPATIENT)
Facility: CLINIC | Age: 65
End: 2025-07-24
Payer: MEDICARE

## 2025-07-24 ENCOUNTER — TELEPHONE (OUTPATIENT)
Facility: CLINIC | Age: 65
End: 2025-07-24

## 2025-07-24 ENCOUNTER — NURSE ONLY (OUTPATIENT)
Facility: CLINIC | Age: 65
End: 2025-07-24
Payer: MEDICARE

## 2025-07-24 DIAGNOSIS — I15.2 HYPERTENSION ASSOCIATED WITH DIABETES (HCC): ICD-10-CM

## 2025-07-24 DIAGNOSIS — E11.69 DYSLIPIDEMIA ASSOCIATED WITH TYPE 2 DIABETES MELLITUS (HCC): ICD-10-CM

## 2025-07-24 DIAGNOSIS — E11.59 HYPERTENSION ASSOCIATED WITH DIABETES (HCC): ICD-10-CM

## 2025-07-24 DIAGNOSIS — Z79.4 TYPE 2 DIABETES MELLITUS WITH HYPERGLYCEMIA, WITH LONG-TERM CURRENT USE OF INSULIN (HCC): Primary | ICD-10-CM

## 2025-07-24 DIAGNOSIS — I25.10 CORONARY ARTERY DISEASE INVOLVING NATIVE CORONARY ARTERY OF NATIVE HEART, UNSPECIFIED WHETHER ANGINA PRESENT: ICD-10-CM

## 2025-07-24 DIAGNOSIS — E11.65 TYPE 2 DIABETES MELLITUS WITH HYPERGLYCEMIA, WITH LONG-TERM CURRENT USE OF INSULIN (HCC): Primary | ICD-10-CM

## 2025-07-24 DIAGNOSIS — E78.5 DYSLIPIDEMIA ASSOCIATED WITH TYPE 2 DIABETES MELLITUS (HCC): ICD-10-CM

## 2025-07-24 PROCEDURE — 95251 CONT GLUC MNTR ANALYSIS I&R: CPT | Performed by: NURSE PRACTITIONER

## 2025-07-24 PROCEDURE — 99214 OFFICE O/P EST MOD 30 MIN: CPT | Performed by: NURSE PRACTITIONER

## 2025-07-24 RX ORDER — INSULIN PMP CART,AUT,G6/7,CNTR
1 EACH SUBCUTANEOUS
Qty: 10 EACH | Refills: 3 | Status: SHIPPED | OUTPATIENT
Start: 2025-07-24

## 2025-07-24 RX ORDER — DAPAGLIFLOZIN 10 MG/1
10 TABLET, FILM COATED ORAL DAILY
Qty: 30 TABLET | Refills: 0 | Status: SHIPPED | OUTPATIENT
Start: 2025-07-24 | End: 2025-08-23

## 2025-07-24 NOTE — PROGRESS NOTES
Telehealth visit: Please note that the following visit was completed using two-way, real-time interactive audio and video communication.  Time Spent:  27 min         No chief complaint on file.    Sylvia is a 64 year old  presenting for type 2  DM medication management.   Primary care physician: Charisma Olivarez MD  Last Diabetes appointment with me: --> due to missing prandial insulin doses, trial of glipizide was recommended. Patient did start but had no effect on Blood sugar trends; it was recommended to discontinued prescription. Encouraged more consistent novolog doses   Admits she has hard time w the novolog doses - especially at work 2 -3 d/week     Did set up payment plan for MED part D prescription plan and cost of Omni pod now affordable.     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)           Diabetes History:  Type 2 DM ~    Patient has not had hospitalizations for blood sugar issues  has history of pancreatitis x 3 (, , )       Previous DM therapies:  Ozempic:   pancreatitis   Metformin ER/IR  - severe nausea and diarrhea  Januvia - pancreatitis (2017)  Jardiance (10 mg)- yeast infetdion   Novolog - reported  hypoglycemia    Current DM Regimen:  Farxiga 10 m tab daily in am  ( off since )   Toujeo 25 units once daily    NOVOLOG flex pen: 5 units w B/D meals plus scale -->not consistently dosing    IF Blood sugar is over:       200-240,  +1 units   241-280 , + 2 units   Over 281, +3 units     HGBA1C:    Lab Results   Component Value Date    A1C 9.2 (A) 2025    A1C 9.1 2025    A1C 9.0 (A) 2024     (H) 2024           Lab Results   Component Value Date    CHOLEST 203 (H) 2025    CHOLEST 180 2024    TRIG 92 2025    TRIG 98 2024    HDL 53 2025    HDL 52 2024     (H) 2025     (H) 2024     Lab Results   Component Value Date    MICROALBCREA 376.8 (H) 2025    MICROALBCREA 736.3 (H) 2024      Lab Results   Component Value Date    CREATSERUM 0.76 2025    CREATSERUM 0.61 2024    EGFRCR 87 2025    EGFRCR 99 2024     Lab Results   Component Value Date    AST 16 2025    AST 20 2024    ALT 13 2025    ALT 15 2024       Lab Results   Component Value Date    TSH 1.256 2025    TSH 1.300 2024    T4F 1.19 09/10/2019     Component      Latest Ref Rng 2025   Pancreatic Islet Cells      Neg:<1:1  Negative    C-PEPTIDE      1.1 - 4.4 ng/mL 3.1        DM Complications:  Microvascular:   Neuropathy: yes  Retinopathy: no  Nephropathy: yes    Macrovascular:  PVD: no  CAD: yes  HX CAD, CABG    Stroke/CVA: no    Modifying factors:  Medication adherence missing NOVOLOG doses    Barriers :cost w Medicare   Recent steroids, illness or infections (past 90d) : no     Allergies: Statins, Ozempic (0.25 or 0.5 mg-dose) [semaglutide], Lisinopril, and Seasonal    Past Medical History:    Back problem    cervical radiculopathy C4-C7    Diabetes (HCC)    High blood pressure    High cholesterol    Pancreatitis (HCC)    caused by new diabetic medication    Visual impairment    glasses     Past Surgical History:   Procedure Laterality Date    Appendectomy            Carpal tunnel release  2018    LEFT CARPAL TUNNEL RELEASE, Dr. Torres    Colonoscopy N/A 2017    Procedure: COLONOSCOPY;  Surgeon:  Tristin Sanchez MD;  Location: [facility]     Social History     Socioeconomic History    Marital status:    Tobacco Use    Smoking status: Former     Current packs/day: 0.00     Types: Cigarettes     Start date: 12/20/1984     Quit date: 12/20/2021     Years since quitting: 3.5    Smokeless tobacco: Never    Tobacco comments:     1-2 cigarettes/day   Vaping Use    Vaping status: Never Used   Substance and Sexual Activity    Alcohol use: Yes     Comment: social, wine only    Drug use: No   Other Topics Concern    Caffeine Concern No     Comment: 1 cup daily    Exercise Yes     Comment: 1-2 days weekly     Social Drivers of Health     Food Insecurity: Low Risk  (2/12/2025)    Received from [facility]    Food Insecurity     Within the past 12 months, you worried that your food would run out before you got money to buy more.  : Never true     Within the past 12 months, the food you bought just didn't last and you didn't have money to get more. : Never true   Transportation Needs: Not At Risk (2/12/2025)    Received from [facility]    Transportation Needs     In the past 12 months, has lack of reliable transportation kept you from medical appointments, meetings, work or from getting things needed for daily living? : No   Housing Stability: Not At Risk (2/2/2024)    Received from [facility]     What is your living situation today?: I have a steady place to live     Think about the place you live. Do you have problems with any of the following?: None of the above     Family History   Problem Relation Age of Onset    Hypertension Father     Hypertension Mother     Diabetes Maternal Grandmother      Current Medication List:   Current Outpatient Medications   Medication Sig Dispense Refill    FARXIGA 10 MG Oral Tab Take 1 tablet (10 mg total) by mouth daily. 30 tablet 0    Insulin Disposable Pump (OMNIPOD 5 [id number] PODS GEN 5) Does not apply Misc 1 Device every 3 (three)  days. 10 each 3    Insulin Disposable Pump (OMNIPOD 5 [id number] PODS GEN 5) Does not apply Misc 1 Device every 3 (three) days. 10 each 3    Continuous Glucose Sensor (DEXCOM G7 SENSOR) Does not apply Misc 1 each Every 10 days.      glucagon (GVOKE HYPOPEN 2-PACK) 1 MG/0.2ML Subcutaneous injection Inject 0.2 mL (1 mg total) into the skin as needed.      insulin aspart (NOVOLOG FLEXPEN) 100 Units/mL Subcutaneous Solution Pen-injector Uses up to 15 units daily as directed in divided doses 15 mL 0    Insulin Pen Needle (BD PEN NEEDLE MARYSE U/F) 32G X 4 MM Does not apply Misc Up to 3 x daily with insulin 300 each 1    Insulin Glargine, 1 Unit Dial, (TOUJEO SOLOSTAR) 300 UNIT/ML Subcutaneous Solution Pen-injector Inject 25 Units into the skin daily. 9 mL 2    pregabalin 50 MG Oral Cap Take 1 capsule (50 mg total) by mouth 3 (three) times daily. 90 capsule 2    ezetimibe 10 MG Oral Tab Take 1 tablet (10 mg total) by mouth daily.      LIVALO 1 MG Oral Tab Take 1 mg by mouth daily.      pantoprazole 40 MG Oral Tab EC Take 1 tablet (40 mg total) by mouth every morning before breakfast.      losartan 25 MG Oral Tab Take 1 tablet (25 mg total) by mouth daily. 90 tablet 1    metoprolol tartrate 25 MG Oral Tab Take 1 tablet (25 mg total) by mouth 2x Daily(Beta Blocker). 180 tablet 1    aspirin 81 MG Oral Tab EC Take one tab daily Start on 1/3/22 90 tablet 1    ONETOUCH VERIO In Vitro Strip 1 strip by In Vitro route 4 (four) times daily. 400 strip 0    ONETOUCH DELICA LANCETS FINE Does not apply Misc Use to check blood sugar 2 times daily 200 each 3           DM associated review of  symptoms:   Endocrine: Polyuria, polyphagia, polydipsia: no  Neurological: Paresthesias: yes   + B LE   HEENT: Blurred vision: no  Skin: no rash or wounds  Hematological: Hypoglycemia: no    Review of Systems     LUNGS: denies shortness of breath   CARDIOVASCULAR: denies chest pain  GI: denies abdominal pain, nausea,  diarrhea ,  constipation   :  denies dysuria      Physical exam:  There were no vitals taken for this visit.  There is no height or weight on file to calculate BMI.      Physical Exam     Constitutional: Normal appearance   Cardiovascular: Not assessed   Pulmonary/Chest: Effort normal  Neurological: Alert and oriented .   Psychiatric: Normal mood and affect.        Assessment/Plan:    Hypertension  needs ongoing monitoring   CPM     Dyslipidemia   Cholesterol: 203, done on 2/26/2025.  HDL Cholesterol: 53, done on 2/26/2025.  TriGlycerides 92, done on 2/26/2025.  LDL Cholesterol: 133, done on 2/26/2025.    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  Agreeable to pursue Omni pod 5 since payment plan   Blood sugar not improved w starting MERINO--> stop Glipizide XL 5mg once daily in AM     Restart Farxiga 10mg once daily     Advised to hold toujeo night before pump start.   Bring novolog insulin to pump start appointment     For now, continue   Dexcom G7 ([facility])   Toujeo solostar : 25 units once daily     NOVOLOG flex pen: 5 units three times daily at meals plus scale:   IF Blood sugar is   200-240, +1 units   241-280 , + 2 units   Over 281, +3 units     Does not want to carb count; reviewed typical  meal recall for B, L, D  Would recommend entering carbs at meals when started on pump:     B 40 -45  L 65    D: 40-45  Snack : 15 - 20       Reviewed clinical signifiance of A1c, adverse effects of suboptimal glucose control, and goals of therapy   Discussed the A1C test, what the value reflects and the goal for the patient.   Discussed w patient glucose targets (Fasting < 130 and post prandial <180 ) and complications associated with hyperglycemia and uncontrolled DM (on AVS)   Recommend SMBG checks 2 x daily (fasting and varying post prandial times)  Continue with lifestyle modifications due to positive impact on diabetes/blood sugars/health (portion control, physical activity, weight loss)     Reviewed hypoglycemia signs/symptoms, treatment using the Rule of 15' and when to call DM center . (on AVS)   For hypoglycemia emergency, Glucagon rx: gvoke rx  (2024)   2 week appointment w [facility] after pump started and then 4- 6 week w me , and also r ecommended to contact DM clinic sooner if questions or concerns.    The patient indicates understanding of these issues and agrees to the plan.    Diabetes complications & risks surveillance:   A1C/Blood pressure: as reported    Last dilated eye exam: No data recorded Exam shows retinopathy? No data recorded  Last diabetic foot exam: No data recorded  Nephropathy screening:   Continue arb rx.    Lab Results   Component Value Date    EGFRCR 87 02/26/2025    MICROALBCREA 376.8 (H) 02/26/2025     LIPID screening:    Lab Results   Component Value Date    CHOLEST 203 (H) 02/26/2025     (H) 02/26/2025    TRIG 92 02/26/2025    HDL 53 02/26/2025    FASTING Yes 02/26/2025    FASTING Yes 02/26/2025     Cholesterol Lowering Medications            ezetimibe 10 MG Oral Tab    LIVALO 1 MG Oral Tab               Orders Placed This Encounter    GLUC MNTR CONT REC FROM NTRSTL TISS FLU PHYS I&R    FARXIGA 10 MG Oral Tab     Sig: Take 1 tablet (10 mg total) by mouth daily.     Dispense:   30 tablet     Refill:  0    Insulin Disposable Pump (OMNIPOD 5 [id number] PODS GEN 5) Does not apply Misc     Si Device every 3 (three) days.     Dispense:  10 each     Refill:  3       Note to patient: The  Cures Act makes medical notes like these available to patients in the interest of transparency. However, be advised this is a medical document. It is intended as peer to peer communication. It is written in medical language and may contain abbreviations or verbiage that are unfamiliar. It may appear blunt or direct. Medical documents are intended to carry relevant information, facts as evident, and the clinical opinion of the practitioner.       This has been done in good juliana to provide continuity of care in the best interest of the provider-patient relationship, due to the on-going public health crisis/national emergency and because of restrictions of visitation.  There are limitations of this visit as no or only very limited physical exam could be performed.  Every conscious effort was taken to allow for sufficient and adequate time.  This billing visit was spent on reviewing blood sugar trends, DM related labs, medications and decision making.  Appropriate medical decision-making and tests are ordered as detailed in the plan of care above.      Sylvia Melo understands phone or video evaluation is not a substitute for face-to-face examination or emergency care. Patient advised to go to ER or call 911 for worsening symptoms or acute distress.     CEASAR Mc

## 2025-07-24 NOTE — PATIENT INSTRUCTIONS
Stop glipizide  ( no change on Blood sugar trends)         Restart Farxiga 10mg once daily--- when you can get it from [facility]    For now continue   Dexcom G7 ([facility])   Toujeo solostar : 25 units once daily       NOVOLOG flex pen: 5 units at meals   If Blood sugar is higher:   200-240, +1 units   241-280 , + 2 units   Over 281, +3 units       We will get you scheduled with Omni pod pump --> appointment  altagracia Frederick August 13th at 4:30 pm   [address] [place] [address], [address]   [place], [place]     The night before pump start  appointment; HOLD TOUJEO dose   Bring novolog to your pump start appointment so you can use in the pump       Any issues w Dexcom sensors: call  Dexcom  customer service:  [phone].      Listen to prompts: press #2 for Technical Support  or any error message     Fluctuations in blood sugars are best detected by testing your sugar with your meter.   In order for me to determine any patterns in your blood sugars, you will need to test your blood sugar 1- 2 times daily     It would be best to change up the times of day that you are testing your sugar.   Always test before breakfast (fasting) and then alternate testing blood sugar 1-2 hours after your meals.     American Diabetes Association: blood sugar targets:     Fasting blood sugar (before breakfast) Target:    (ideally less than 110)  2 hours after eating less than 180 (ideally less than  150 )     Call for blood sugars less than  75 or greater than  200 more than 2 times in a week     If you are wearing the sensor, please look at your trends/averages    Recommendations:   GMI (estimated A1C ) target under  7%     Time in range (healthy blood sugar targets) : goal is over 70%   less than 70 : goal is less than 4%   Over 180 : goal is less than 20 %   Over 250: goal is  less than 5%     Watch for low blood sugars: (less than 70 )    Treatment of Low Blood Glucose Action Plan  1. Check  blood glucose to be sure that it is low. You cannot  always go by symptoms or how you feel. If in doubt, treat your low blood glucose anyway.  Rule of 15 :     2. Take 15 grams of carbohydrate (carb). Here are some choices:    4 oz. regular fruit juice  3-4 glucose tablets  6 oz. regular soda   7-8 jelly beans    3. Recheck blood glucose after 10-15 minutes. If blood glucose is still low (less than 70 mg/dl) repeat the treatment (step 2).    4. If your next meal is more than one hour away, eat a small snack.    5. If you’re not sure what caused your low blood glucose, call your healthcare provider.    6. Always check your blood glucose before you drive       To treat a low, I recommend you carry with you easy, pre-portioned treatment for low blood sugars that are 15G of carbs:   - Children sized squeeze pouch applesauce (high fiber + carbs help prevent too high of a spike)  - Small children's sized juicebox- 15g carb --> 4oz juice box  - Glucose tablets from [employer], you can find them near diabetes supplies --> Note, you will need to eat 3-4 tablets to get to 15g of carbs  - Children sized fruit snack pack- look for one with 15 grams of total carbohydrate    AVOID complex carbs, or foods that contain fats along with carbs (like chocolate) can slow the absorption of glucose and should NOT be used to treat an emergency low

## 2025-07-30 ENCOUNTER — TELEPHONE (OUTPATIENT)
Facility: CLINIC | Age: 65
End: 2025-07-30

## 2025-08-07 ENCOUNTER — TELEPHONE (OUTPATIENT)
Dept: FAMILY MEDICINE | Age: 65
End: 2025-08-07

## 2025-08-07 ENCOUNTER — TELEPHONE (OUTPATIENT)
Dept: INTERNAL MEDICINE | Age: 65
End: 2025-08-07

## 2025-08-11 RX ORDER — PITAVASTATIN CALCIUM 1.04 MG/1
TABLET, FILM COATED ORAL
Qty: 90 TABLET | Refills: 1 | Status: SHIPPED | OUTPATIENT
Start: 2025-08-11

## (undated) DIAGNOSIS — Z79.4 TYPE 2 DIABETES MELLITUS WITH HYPERGLYCEMIA, WITH LONG-TERM CURRENT USE OF INSULIN (HCC): ICD-10-CM

## (undated) DIAGNOSIS — I10 ESSENTIAL HYPERTENSION: ICD-10-CM

## (undated) DIAGNOSIS — E78.2 MIXED HYPERLIPIDEMIA: ICD-10-CM

## (undated) DIAGNOSIS — Z87.19 HISTORY OF PANCREATITIS: ICD-10-CM

## (undated) DIAGNOSIS — Z95.1 S/P CABG (CORONARY ARTERY BYPASS GRAFT): Primary | ICD-10-CM

## (undated) DIAGNOSIS — E11.65 TYPE 2 DIABETES MELLITUS WITH HYPERGLYCEMIA, WITH LONG-TERM CURRENT USE OF INSULIN (HCC): ICD-10-CM

## (undated) DIAGNOSIS — M79.2 NERVE PAIN: ICD-10-CM

## (undated) DEVICE — GOWN,SIRUS,FAB REINF,RAGLAN,XL,STERILE: Brand: MEDLINE

## (undated) DEVICE — ABSORBABLE HEMOSTAT (OXIDIZED REGENERATED CELLULOSE, U.S.P.): Brand: SURGICEL

## (undated) DEVICE — ENDOSCOPY PACK - LOWER: Brand: MEDLINE INDUSTRIES, INC.

## (undated) DEVICE — DISPOSABLE BIPOLAR FORCEPS 4" (10.2CM) JEWELERS, STRAIGHT 0.4MM TIP AND 12 FT. (3.6M) CABLE: Brand: KIRWAN

## (undated) DEVICE — STERILE POLYISOPRENE POWDER-FREE SURGICAL GLOVES: Brand: PROTEXIS

## (undated) DEVICE — SOL  .9 1000ML BTL

## (undated) DEVICE — GLOVE BIOGEL ORTHO SZ 8

## (undated) DEVICE — SOL LACT RINGERS 1000ML

## (undated) DEVICE — SUTURE PROLENE 7-0 CC

## (undated) DEVICE — ENDOSCOPY PACK UPPER: Brand: MEDLINE INDUSTRIES, INC.

## (undated) DEVICE — LIGHT HANDLE

## (undated) DEVICE — SUTURE SILK 2-0

## (undated) DEVICE — SUTURE PROLENE 6-0 C-1

## (undated) DEVICE — SUTURE VICRYL 3-0 RB-1

## (undated) DEVICE — FILTERLINE NASAL ADULT O2/CO2

## (undated) DEVICE — DRAPE HALF 40X58 DYNJP2410

## (undated) DEVICE — NON-ADHERENT PAD PREPACK: Brand: TELFA

## (undated) DEVICE — INTENDED USED TO PROTECT, TAG AND HELP LOCATED SUTURES DURING SURGERY: Brand: STERION®SUTURE AID BOOTIES

## (undated) DEVICE — CELL SAVER RESERVOIR

## (undated) DEVICE — Device: Brand: DEFENDO AIR/WATER/SUCTION AND BIOPSY VALVE

## (undated) DEVICE — ALCOHOL 70% 4 OZ

## (undated) DEVICE — SUTURE PROLENE 8-0 BV-130-5

## (undated) DEVICE — HEMOCLIP HORIZON SM MULTI

## (undated) DEVICE — BLOWER CO2 MEDTRONIC/DLP 22150

## (undated) DEVICE — GOWN SURG AERO CHROME XXL

## (undated) DEVICE — BANDAGE ROLL,100% COTTON, 6 PLY, LARGE: Brand: KERLIX

## (undated) DEVICE — SUTURE POLYDEK 2-0

## (undated) DEVICE — UPPER EXTREMITY CDS-LF: Brand: MEDLINE INDUSTRIES, INC.

## (undated) DEVICE — [HIGH FLOW INSUFFLATOR,  DO NOT USE IF PACKAGE IS DAMAGED,  KEEP DRY,  KEEP AWAY FROM SUNLIGHT,  PROTECT FROM HEAT AND RADIOACTIVE SOURCES.]: Brand: PNEUMOSURE

## (undated) DEVICE — CELL SAVER BAG 600ML 4R2023

## (undated) DEVICE — SUTURE ETHILON 3-0 PS-2

## (undated) DEVICE — FORCEP BIOPSY RJ4 LG CAP W/ND

## (undated) DEVICE — SOLUTION SURG DURA PREP HAZMAT

## (undated) DEVICE — TRANSPOSAL ULTRAFLEX DUO/QUAD ULTRA CART MANIFOLD

## (undated) DEVICE — SYRINGE 30ML LL TIP

## (undated) DEVICE — SUTURE WIRE DOUBLE STERNOTOMY

## (undated) DEVICE — GAUZE SPONGES,8 PLY: Brand: CURITY

## (undated) DEVICE — SUTURE PROLENE 4-0 V-5

## (undated) DEVICE — GLOVE BIOGEL M SURG SZ 8

## (undated) DEVICE — Device: Brand: VIRTUOSAPH PLUS WITH RADIAL INDICATION

## (undated) DEVICE — OPEN HEART: Brand: MEDLINE INDUSTRIES, INC.

## (undated) NOTE — LETTER
[facility] [address], [address] [place] [address]  Consent for Procedure/Sedation    Date: ***    Time: ***      {edw ivs consent:2544}

## (undated) NOTE — IP AVS SNAPSHOT
[facility] [place] [address] [place], [address] ~ [phone]                Discharge Summary   4/4/2017    Maritza Nielsen           Admission Information        Provider Department    4/4/2017 Melania Kumar MD  Endoscopy - Resume your regular diet as tolerated unless otherwise instructed. - Start with light meals to minimize bloating.  - Do not drink alcohol today.     Medication:  - If you have questions about resuming your normal medications, please contact your Primary Future Appointments        Provider Department    4/6/2017 9:30 AM Luisitotaylor Juarez [facility], [place]    5/5/2017 8:30 AM Martin Saavedra [facility] [place], [place]    6/6/2017 8:45 AM Martin Saavedra [facility] [place], [facility] - If you are a smoker or have smoked in the last 12 months, we encourage you to explore options for quitting.     - If you have concerns related to behavioral health issues or thoughts of harming yourself, contact [facility] a

## (undated) NOTE — LETTER
Patient Name: Fermín Sandoval  YOB: 1960          MRN number:  [id number]  Date:  5/7/2018  Referring Physician:  Pari Vernon     Progress Summary    Pt has attended 7, cancelled 0, and no shown 0 visits in Occupational Therapy.      Amarilys Gregory Plan: Pt to follow up with MD this week regarding return to work. Will follow MD recommendations regarding further treatment and d/c if no further orders received.      Patient/Family/Caregiver was advised of these findings, precautions, and treatment optio

## (undated) NOTE — LETTER
[address]  [address] [facility] [address], [address] [facility]  Authorization for Surgical Operation and Procedure     Date:___12/22/21________                                                                                                         Time:____ that can occur: fever and allergic reactions, hemolytic reactions, transmission of diseases such as Hepatitis, AIDS and Cytomegalovirus (CMV) and fluid overload.   In the event that I wish to have an autologous transfusion of my own blood, or a directed don when the applicable recovery period ends for purposes of reinstating the DNAR order.   10. Patients having a sterilization procedure: I understand that if the procedure is successful the results will be permanent and it will therefore be impossible for me t doctor) to give me medicine and do additional procedures as necessary.  Some examples are: Starting or using an “IV” to give me medicine, fluids or blood during my procedure, and having a breathing tube placed to help me breathe when I’m asleep (intubation) understand that rare but potential complications include headache, bleeding, infection, seizure, irregular heart rhythms, and nerve injury.     I can change my mind about having anesthesia services at any time before I get the medicine.    _________________

## (undated) NOTE — LETTER
[facility] [address], [address] [place] [address]  Consent for Procedure/Sedation    Date: ***    Time: ***      {edw ivs consent:8619}

## (undated) NOTE — LETTER
18          José Checo  :  1960      To Whom It May Concern: This patient was seen in our office on 18 . Patient is to remain off work until her next appointment in 4 weeks.     If this office may be of further assistance, stiven

## (undated) NOTE — LETTER
[facility] [address], [address] [place] [address]  Consent for Procedure/Sedation    Date: ***    Time: ***      {edw ivs consent:9281}

## (undated) NOTE — IP AVS SNAPSHOT
[facility] [place] [address] [place], [address] ~ [phone]                Discharge Summary   2/6/2017    [facility]           Admission Information        Provider Department    2/6/2017 All Gill MD  [id number]         Ivis Herron - additional instructions        Inject 15 Units into the skin daily.     Lilliana Ramos     [    ]    [    ]    [    ]    [    ]         Candy Madrigal taking these medications        Instructions Authorizing Provider    Morning Afternoon Evening As Needed    Michael Hocjewels Contact information:    [address]  [phone]          Follow up with Cristal Burris MD.    Specialty:  ENDOCRINOLOGY    Why:  AS INSTRUCTED    Contact information:    Shira Macedo [facility] [address] [facility] [phone] ALT Bilirubin,Total Total Protein Albumin Sodium Potassium Chloride    (02/07/17)  13 (L) (02/07/17)  0.5 (02/07/17)  6.4 (02/07/17)  2.9 (L) (02/07/17)  144 (02/07/17)  3.5 (L) (02/07/17)  110      Pending Labs     Order Current Status    IGG, SUBCLASSES If you have questions, you can call [phone] to talk to our [facility] Staff. Remember, MyChart is NOT to be used for urgent needs.   For medical emergencies, dial 911.             _________________________________________________________________ What to report to your healthcare team: Difficulty urinating, dizziness, no bowel movement in 2+ days, unresolved pain

## (undated) NOTE — LETTER
02/07/2017    Deniz Lazaro      To Whom It May Concern: The above patient was seen at [facility] for treatment of a medical condition from 2/6/2017-2/7/2017. The patient may return to work on 2/13/2017 without any limitations.   Please contac

## (undated) NOTE — LETTER
Date: 4/10/2018    Patient Name: Victor Manuel Casas          To Whom it may concern: The above patient was seen at the [facility] for treatment of a medical condition. She continues to undergo treatment for this condition.   She should be exc

## (undated) NOTE — Clinical Note
Patient would like to pursue Omnipod 5, using G7 currently. Wants to check into medicare coverage before starting.

## (undated) NOTE — LETTER
Sharmila Bose M.D., F.A.C.S. Genia Keane M.D., F.A.C.S. Kaitlyn Gunn M.D., Jarod Oliver. DARRELL Wells M.D., F.A.C.S. Catie Moncada. Xiomara Coleman M.D., F.A.C.S. Ankush Narayanan M.D. YAZAN Gallardo M.D., F. concerns answered. If there are any issues which have not been adequately addressed, we ask you to bring them forward so that we can thoroughly address them.     A patient who is fully informed and understands their condition and options for treatment, as w _____    A [facility] surgeon as explained to me that if I should so desire, he/she is willing to explain my case and the surgical and non-surgical options to family members:             Yes _____ No _____    A [facility] surgeon has answered all of my questions regardin

## (undated) NOTE — LETTER
2018  RE: Hudson Tamez     : 1960    Dear Yinka Salas,    This letter is to inform you that your patient is being scheduled for surgery with  on 2018 at [facility]. We have asked the patient to contact your office to s

## (undated) NOTE — LETTER
Date: 4/17/2018    Patient Name: Samra Stratton          To Whom it may concern: The above patient was seen at the [facility] for treatment of a medical condition. She may participate in CPR class.     If you have any questions, please co

## (undated) NOTE — LETTER
[facility] [address], [address] [place] [address]  Consent for Procedure/Sedation    Date: ***    Time: ***      {edw ivs consent:3944}

## (undated) NOTE — LETTER
[address] [facility] [place] FOR BLOOD OR BLOOD COMPONENTS      In the course of your treatment, it may become necessary to administer a transfusion of blood or blood components.  This form provides basic information concerning this proc alternatives to you if it has not already been done. Cristi Montana, have read/had read to me the above. I understand the matters bearing on the decision whether or not to authorize a transfusion of blood or blood components.  I have no questions which